# Patient Record
Sex: FEMALE | Race: WHITE | NOT HISPANIC OR LATINO | Employment: OTHER | URBAN - METROPOLITAN AREA
[De-identification: names, ages, dates, MRNs, and addresses within clinical notes are randomized per-mention and may not be internally consistent; named-entity substitution may affect disease eponyms.]

---

## 2023-08-09 ENCOUNTER — HOSPITAL ENCOUNTER (OUTPATIENT)
Dept: DATA CONVERSION | Facility: HOSPITAL | Age: 83
Discharge: HOME | End: 2023-08-09

## 2023-08-09 DIAGNOSIS — C67.8 MALIGNANT NEOPLASM OF OVERLAPPING SITES OF BLADDER (MULTI): ICD-10-CM

## 2023-09-02 PROBLEM — R40.1 CLOUDED CONSCIOUSNESS: Status: ACTIVE | Noted: 2023-09-02

## 2023-09-02 PROBLEM — R07.89 PAIN OF STERNUM: Status: ACTIVE | Noted: 2023-09-02

## 2023-09-02 PROBLEM — R42 VERTIGO: Status: ACTIVE | Noted: 2023-09-02

## 2023-09-02 PROBLEM — C67.9 MALIGNANT TUMOR OF URINARY BLADDER (MULTI): Status: ACTIVE | Noted: 2023-09-02

## 2023-09-02 PROBLEM — M19.90 OSTEOARTHRITIS: Status: ACTIVE | Noted: 2023-09-02

## 2023-09-02 PROBLEM — R47.9 SPEECH PROBLEM: Status: ACTIVE | Noted: 2023-09-02

## 2023-09-02 PROBLEM — G25.81 RESTLESS LEGS: Status: ACTIVE | Noted: 2023-09-02

## 2023-09-02 PROBLEM — F41.9 ANXIETY: Status: ACTIVE | Noted: 2023-09-02

## 2023-09-02 PROBLEM — N95.9 MENOPAUSAL AND POSTMENOPAUSAL DISORDER: Status: ACTIVE | Noted: 2023-09-02

## 2023-09-02 PROBLEM — R49.0 HOARSENESS: Status: ACTIVE | Noted: 2023-09-02

## 2023-09-02 PROBLEM — M76.899 ENTHESOPATHY OF HIP REGION: Status: ACTIVE | Noted: 2023-09-02

## 2023-09-02 PROBLEM — N28.1 RENAL CYST: Status: ACTIVE | Noted: 2023-09-02

## 2023-09-02 PROBLEM — R55 SYNCOPE: Status: ACTIVE | Noted: 2023-09-02

## 2023-09-02 PROBLEM — M19.90 DEGENERATIVE JOINT DISEASE: Status: ACTIVE | Noted: 2023-09-02

## 2023-09-02 PROBLEM — E55.9 VITAMIN D DEFICIENCY: Status: ACTIVE | Noted: 2023-09-02

## 2023-09-02 PROBLEM — F01.50 VASCULAR DEMENTIA (MULTI): Status: ACTIVE | Noted: 2023-09-02

## 2023-09-02 PROBLEM — H90.3 SENSORINEURAL HEARING LOSS, BILATERAL: Status: ACTIVE | Noted: 2023-09-02
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
## 2023-09-02 PROBLEM — I34.0 MITRAL VALVE REGURGITATION: Status: ACTIVE | Noted: 2023-09-02

## 2023-09-02 PROBLEM — F43.21 ADJUSTMENT DISORDER WITH DEPRESSED MOOD: Status: ACTIVE | Noted: 2023-09-02

## 2023-09-02 PROBLEM — R07.9 CHEST PAIN: Status: ACTIVE | Noted: 2023-09-02

## 2023-09-02 PROBLEM — I10 BENIGN HYPERTENSION: Status: ACTIVE | Noted: 2023-09-02

## 2023-09-02 PROBLEM — G25.0 ESSENTIAL TREMOR: Status: ACTIVE | Noted: 2023-09-02

## 2023-09-02 PROBLEM — C67.9 BLADDER CANCER (MULTI): Status: ACTIVE | Noted: 2023-09-02

## 2023-09-02 PROBLEM — M85.80 OSTEOPENIA: Status: ACTIVE | Noted: 2023-09-02

## 2023-09-02 PROBLEM — R51.9 HEADACHE: Status: ACTIVE | Noted: 2023-09-02

## 2023-09-02 PROBLEM — R09.89 CARDIOVASCULAR SYMPTOMS: Status: ACTIVE | Noted: 2023-09-02

## 2023-09-02 PROBLEM — M17.9 OSTEOARTHROSIS OF KNEE: Status: ACTIVE | Noted: 2023-09-02

## 2023-09-02 PROBLEM — I67.9 CEREBROVASCULAR DISEASE: Status: ACTIVE | Noted: 2023-09-02

## 2023-09-02 PROBLEM — E87.5 HYPERKALEMIA: Status: ACTIVE | Noted: 2023-09-02

## 2023-09-02 PROBLEM — R47.89 WORD FINDING DIFFICULTY: Status: ACTIVE | Noted: 2023-09-02

## 2023-09-02 PROBLEM — C67.8 MALIGNANT NEOPLASM OF OVERLAPPING SITES OF BLADDER (MULTI): Status: ACTIVE | Noted: 2023-09-02

## 2023-09-02 PROBLEM — E87.1 HYPONATREMIA: Status: ACTIVE | Noted: 2023-09-02

## 2023-09-02 PROBLEM — E78.5 DYSLIPIDEMIA: Status: ACTIVE | Noted: 2023-09-02

## 2023-09-02 PROBLEM — R41.89 IMPAIRED COGNITION: Status: ACTIVE | Noted: 2023-09-02

## 2023-09-02 PROBLEM — G40.909 SEIZURE DISORDER (MULTI): Status: ACTIVE | Noted: 2023-09-02

## 2023-09-02 PROBLEM — R53.1 ASTHENIA: Status: ACTIVE | Noted: 2023-09-02

## 2023-09-02 PROBLEM — R41.82 ALTERED MENTAL STATUS: Status: ACTIVE | Noted: 2023-09-02

## 2023-09-02 PROBLEM — I61.9 CEREBRAL HEMORRHAGE (MULTI): Status: ACTIVE | Noted: 2023-09-02

## 2023-09-02 PROBLEM — E78.5 HYPERLIPIDEMIA: Status: ACTIVE | Noted: 2023-09-02

## 2023-09-02 PROBLEM — E83.52 HYPERCALCEMIA: Status: ACTIVE | Noted: 2023-09-02

## 2023-09-02 PROBLEM — I63.9 CEREBROVASCULAR ACCIDENT (MULTI): Status: ACTIVE | Noted: 2023-09-02

## 2023-09-02 PROBLEM — R01.1 HEART MURMUR: Status: ACTIVE | Noted: 2023-09-02

## 2023-09-02 PROBLEM — R07.1 PAINFUL BREATHING: Status: ACTIVE | Noted: 2023-09-02

## 2023-09-02 RX ORDER — ASCORBIC ACID 250 MG
250 TABLET ORAL DAILY
COMMUNITY
End: 2023-10-04 | Stop reason: ENTERED-IN-ERROR

## 2023-09-02 RX ORDER — CITALOPRAM 20 MG/1
.5-1 TABLET, FILM COATED ORAL DAILY
COMMUNITY
Start: 2022-02-08 | End: 2023-10-03 | Stop reason: ENTERED-IN-ERROR

## 2023-09-02 RX ORDER — GLUCOSAMINE/CHONDROITIN/C/MANG 500-400 MG
CAPSULE ORAL
COMMUNITY
End: 2023-10-04 | Stop reason: ALTCHOICE

## 2023-09-02 RX ORDER — HYDROCHLOROTHIAZIDE 25 MG/1
TABLET ORAL
COMMUNITY
Start: 2021-02-12 | End: 2023-10-03 | Stop reason: ENTERED-IN-ERROR

## 2023-09-02 RX ORDER — LOSARTAN POTASSIUM 25 MG/1
25 TABLET ORAL NIGHTLY
COMMUNITY
Start: 2015-08-28

## 2023-09-02 RX ORDER — GABAPENTIN 400 MG/1
400 CAPSULE ORAL 3 TIMES DAILY
COMMUNITY
Start: 2013-02-18 | End: 2023-10-03 | Stop reason: ENTERED-IN-ERROR

## 2023-09-02 RX ORDER — CHOLECALCIFEROL (VITAMIN D3) 25 MCG
25 TABLET ORAL DAILY
COMMUNITY
End: 2023-10-04 | Stop reason: ALTCHOICE

## 2023-09-02 RX ORDER — POLYETHYLENE GLYCOL 3350 17 G/17G
17 POWDER, FOR SOLUTION ORAL NIGHTLY
COMMUNITY
Start: 2022-06-21 | End: 2023-10-04 | Stop reason: ALTCHOICE

## 2023-09-02 RX ORDER — TRAMADOL HYDROCHLORIDE 50 MG/1
50 TABLET ORAL EVERY 6 HOURS PRN
COMMUNITY
Start: 2019-03-18 | End: 2023-10-03 | Stop reason: ENTERED-IN-ERROR

## 2023-09-02 RX ORDER — RIVASTIGMINE TARTRATE 1.5 MG/1
1.5 CAPSULE ORAL DAILY
COMMUNITY
End: 2023-10-04 | Stop reason: ALTCHOICE

## 2023-09-02 RX ORDER — ONDANSETRON 4 MG/1
TABLET, FILM COATED ORAL
COMMUNITY
Start: 2019-03-18 | End: 2023-10-03 | Stop reason: ENTERED-IN-ERROR

## 2023-09-02 RX ORDER — GABAPENTIN 300 MG/1
300 CAPSULE ORAL 3 TIMES DAILY
COMMUNITY
End: 2023-10-03 | Stop reason: ENTERED-IN-ERROR

## 2023-10-03 ENCOUNTER — APPOINTMENT (OUTPATIENT)
Dept: RADIOLOGY | Facility: HOSPITAL | Age: 83
DRG: 565 | End: 2023-10-03
Payer: MEDICARE

## 2023-10-03 ENCOUNTER — HOSPITAL ENCOUNTER (INPATIENT)
Facility: HOSPITAL | Age: 83
LOS: 6 days | Discharge: NURSING, REHAB OR LONG-TERM CARE | DRG: 565 | End: 2023-10-10
Attending: STUDENT IN AN ORGANIZED HEALTH CARE EDUCATION/TRAINING PROGRAM | Admitting: INTERNAL MEDICINE
Payer: MEDICARE

## 2023-10-03 DIAGNOSIS — T79.6XXA TRAUMATIC RHABDOMYOLYSIS, INITIAL ENCOUNTER (CMS-HCC): ICD-10-CM

## 2023-10-03 DIAGNOSIS — R55 SYNCOPE, UNSPECIFIED SYNCOPE TYPE: ICD-10-CM
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
## 2023-10-03 DIAGNOSIS — S09.90XA CLOSED HEAD INJURY, INITIAL ENCOUNTER: ICD-10-CM

## 2023-10-03 DIAGNOSIS — R09.89 CARDIOVASCULAR SYMPTOMS: ICD-10-CM

## 2023-10-03 DIAGNOSIS — R07.9 CHEST PAIN, UNSPECIFIED TYPE: ICD-10-CM

## 2023-10-03 DIAGNOSIS — R53.1 GENERALIZED WEAKNESS: ICD-10-CM

## 2023-10-03 DIAGNOSIS — R79.89 ELEVATED TROPONIN: ICD-10-CM

## 2023-10-03 LAB
ALBUMIN SERPL BCP-MCNC: 4.5 G/DL (ref 3.4–5)
ALP SERPL-CCNC: 71 U/L (ref 33–136)
ALT SERPL W P-5'-P-CCNC: 39 U/L (ref 7–45)
ANION GAP SERPL CALC-SCNC: 13 MMOL/L (ref 10–20)
APPEARANCE UR: CLEAR
AST SERPL W P-5'-P-CCNC: 106 U/L (ref 9–39)
BASOPHILS # BLD AUTO: 0.04 X10*3/UL (ref 0–0.1)
BASOPHILS NFR BLD AUTO: 0.3 %
BILIRUB SERPL-MCNC: 0.8 MG/DL (ref 0–1.2)
BILIRUB UR STRIP.AUTO-MCNC: NEGATIVE MG/DL
BUN SERPL-MCNC: 19 MG/DL (ref 6–23)
CALCIUM SERPL-MCNC: 9.1 MG/DL (ref 8.6–10.3)
CARDIAC TROPONIN I PNL SERPL HS: 633 NG/L (ref 0–13)
CARDIAC TROPONIN I PNL SERPL HS: 723 NG/L (ref 0–13)
CARDIAC TROPONIN I PNL SERPL HS: 911 NG/L (ref 0–13)
CHLORIDE SERPL-SCNC: 99 MMOL/L (ref 98–107)
CK SERPL-CCNC: 3433 U/L (ref 0–215)
CK SERPL-CCNC: 4666 U/L (ref 0–215)
CO2 SERPL-SCNC: 26 MMOL/L (ref 21–32)
COLOR UR: YELLOW
CREAT SERPL-MCNC: 0.98 MG/DL (ref 0.5–1.05)
EOSINOPHIL # BLD AUTO: 0.05 X10*3/UL (ref 0–0.4)
EOSINOPHIL NFR BLD AUTO: 0.4 %
ERYTHROCYTE [DISTWIDTH] IN BLOOD BY AUTOMATED COUNT: 12.6 % (ref 11.5–14.5)
GFR SERPL CREATININE-BSD FRML MDRD: 57 ML/MIN/1.73M*2
GLUCOSE SERPL-MCNC: 101 MG/DL (ref 74–99)
GLUCOSE UR STRIP.AUTO-MCNC: NEGATIVE MG/DL
HCT VFR BLD AUTO: 41.9 % (ref 36–46)
HGB BLD-MCNC: 14.2 G/DL (ref 12–16)
IMM GRANULOCYTES # BLD AUTO: 0.04 X10*3/UL (ref 0–0.5)
IMM GRANULOCYTES NFR BLD AUTO: 0.3 % (ref 0–0.9)
KETONES UR STRIP.AUTO-MCNC: NEGATIVE MG/DL
LACTATE SERPL-SCNC: 1.1 MMOL/L (ref 0.4–2)
LEUKOCYTE ESTERASE UR QL STRIP.AUTO: NEGATIVE
LYMPHOCYTES # BLD AUTO: 1.52 X10*3/UL (ref 0.8–3)
LYMPHOCYTES NFR BLD AUTO: 13 %
MAGNESIUM SERPL-MCNC: 2.21 MG/DL (ref 1.6–2.4)
MCH RBC QN AUTO: 32.9 PG (ref 26–34)
MCHC RBC AUTO-ENTMCNC: 33.9 G/DL (ref 32–36)
MCV RBC AUTO: 97 FL (ref 80–100)
MONOCYTES # BLD AUTO: 1.18 X10*3/UL (ref 0.05–0.8)
MONOCYTES NFR BLD AUTO: 10.1 %
NEUTROPHILS # BLD AUTO: 8.83 X10*3/UL (ref 1.6–5.5)
NEUTROPHILS NFR BLD AUTO: 75.9 %
NITRITE UR QL STRIP.AUTO: NEGATIVE
NRBC BLD-RTO: 0 /100 WBCS (ref 0–0)
PH UR STRIP.AUTO: 7 [PH]
PLATELET # BLD AUTO: 192 X10*3/UL (ref 150–450)
PMV BLD AUTO: 10.4 FL (ref 7.5–11.5)
POTASSIUM SERPL-SCNC: 4.3 MMOL/L (ref 3.5–5.3)
PROT SERPL-MCNC: 7.1 G/DL (ref 6.4–8.2)
PROT UR STRIP.AUTO-MCNC: ABNORMAL MG/DL
RBC # BLD AUTO: 4.31 X10*6/UL (ref 4–5.2)
RBC # UR STRIP.AUTO: ABNORMAL /UL
RBC #/AREA URNS AUTO: NORMAL /HPF
SARS-COV-2 RNA RESP QL NAA+PROBE: NOT DETECTED
SODIUM SERPL-SCNC: 134 MMOL/L (ref 136–145)
SP GR UR STRIP.AUTO: 1.01
UROBILINOGEN UR STRIP.AUTO-MCNC: <2 MG/DL
WBC # BLD AUTO: 11.7 X10*3/UL (ref 4.4–11.3)
WBC #/AREA URNS AUTO: NORMAL /HPF

## 2023-10-03 PROCEDURE — 2500000004 HC RX 250 GENERAL PHARMACY W/ HCPCS (ALT 636 FOR OP/ED): Performed by: NURSE PRACTITIONER

## 2023-10-03 PROCEDURE — 84075 ASSAY ALKALINE PHOSPHATASE: CPT | Performed by: NURSE PRACTITIONER

## 2023-10-03 PROCEDURE — 83605 ASSAY OF LACTIC ACID: CPT | Performed by: NURSE PRACTITIONER

## 2023-10-03 PROCEDURE — 82550 ASSAY OF CK (CPK): CPT | Performed by: NURSE PRACTITIONER

## 2023-10-03 PROCEDURE — 36415 COLL VENOUS BLD VENIPUNCTURE: CPT | Performed by: NURSE PRACTITIONER

## 2023-10-03 PROCEDURE — 99285 EMERGENCY DEPT VISIT HI MDM: CPT | Performed by: STUDENT IN AN ORGANIZED HEALTH CARE EDUCATION/TRAINING PROGRAM

## 2023-10-03 PROCEDURE — 84484 ASSAY OF TROPONIN QUANT: CPT | Performed by: NURSE PRACTITIONER

## 2023-10-03 PROCEDURE — 72170 X-RAY EXAM OF PELVIS: CPT | Mod: FY

## 2023-10-03 PROCEDURE — 71045 X-RAY EXAM CHEST 1 VIEW: CPT | Mod: FY

## 2023-10-03 PROCEDURE — G1004 CDSM NDSC: HCPCS

## 2023-10-03 PROCEDURE — 70450 CT HEAD/BRAIN W/O DYE: CPT | Performed by: RADIOLOGY

## 2023-10-03 PROCEDURE — 2500000001 HC RX 250 WO HCPCS SELF ADMINISTERED DRUGS (ALT 637 FOR MEDICARE OP): Performed by: NURSE PRACTITIONER

## 2023-10-03 PROCEDURE — 81001 URINALYSIS AUTO W/SCOPE: CPT | Performed by: NURSE PRACTITIONER

## 2023-10-03 PROCEDURE — 72125 CT NECK SPINE W/O DYE: CPT | Performed by: RADIOLOGY

## 2023-10-03 PROCEDURE — 96360 HYDRATION IV INFUSION INIT: CPT

## 2023-10-03 PROCEDURE — 99223 1ST HOSP IP/OBS HIGH 75: CPT

## 2023-10-03 PROCEDURE — 96361 HYDRATE IV INFUSION ADD-ON: CPT

## 2023-10-03 PROCEDURE — 71045 X-RAY EXAM CHEST 1 VIEW: CPT | Performed by: STUDENT IN AN ORGANIZED HEALTH CARE EDUCATION/TRAINING PROGRAM

## 2023-10-03 PROCEDURE — 87635 SARS-COV-2 COVID-19 AMP PRB: CPT | Performed by: NURSE PRACTITIONER

## 2023-10-03 PROCEDURE — 82550 ASSAY OF CK (CPK): CPT

## 2023-10-03 PROCEDURE — 83735 ASSAY OF MAGNESIUM: CPT | Performed by: NURSE PRACTITIONER

## 2023-10-03 PROCEDURE — 71275 CT ANGIOGRAPHY CHEST: CPT | Performed by: RADIOLOGY

## 2023-10-03 PROCEDURE — 85025 COMPLETE CBC W/AUTO DIFF WBC: CPT | Performed by: NURSE PRACTITIONER

## 2023-10-03 PROCEDURE — 70450 CT HEAD/BRAIN W/O DYE: CPT

## 2023-10-03 PROCEDURE — 84484 ASSAY OF TROPONIN QUANT: CPT | Performed by: STUDENT IN AN ORGANIZED HEALTH CARE EDUCATION/TRAINING PROGRAM

## 2023-10-03 PROCEDURE — 72170 X-RAY EXAM OF PELVIS: CPT | Performed by: STUDENT IN AN ORGANIZED HEALTH CARE EDUCATION/TRAINING PROGRAM

## 2023-10-03 PROCEDURE — 2550000001 HC RX 255 CONTRASTS: Performed by: STUDENT IN AN ORGANIZED HEALTH CARE EDUCATION/TRAINING PROGRAM

## 2023-10-03 RX ORDER — ASPIRIN 325 MG
325 TABLET ORAL ONCE
Status: COMPLETED | OUTPATIENT
Start: 2023-10-03 | End: 2023-10-03

## 2023-10-03 RX ORDER — ACETAMINOPHEN 325 MG/1
650 TABLET ORAL ONCE
Status: COMPLETED | OUTPATIENT
Start: 2023-10-03 | End: 2023-10-03

## 2023-10-03 RX ORDER — ACETAMINOPHEN 325 MG/1
TABLET ORAL
Status: COMPLETED
Start: 2023-10-03 | End: 2023-10-03

## 2023-10-03 RX ADMIN — SODIUM CHLORIDE 500 ML: 9 INJECTION, SOLUTION INTRAVENOUS at 16:00

## 2023-10-03 RX ADMIN — IOHEXOL 54 ML: 350 INJECTION, SOLUTION INTRAVENOUS at 20:43

## 2023-10-03 RX ADMIN — ASPIRIN 325 MG ORAL TABLET 325 MG: 325 PILL ORAL at 20:44

## 2023-10-03 RX ADMIN — SODIUM CHLORIDE 500 ML: 9 INJECTION, SOLUTION INTRAVENOUS at 20:45

## 2023-10-03 RX ADMIN — ACETAMINOPHEN 650 MG: 325 TABLET, FILM COATED ORAL at 20:44

## 2023-10-03 ASSESSMENT — PAIN SCALES - GENERAL
PAINLEVEL_OUTOF10: 0 - NO PAIN
PAINLEVEL_OUTOF10: 0 - NO PAIN

## 2023-10-03 ASSESSMENT — LIFESTYLE VARIABLES
HAVE PEOPLE ANNOYED YOU BY CRITICIZING YOUR DRINKING: NO
EVER HAD A DRINK FIRST THING IN THE MORNING TO STEADY YOUR NERVES TO GET RID OF A HANGOVER: NO
EVER FELT BAD OR GUILTY ABOUT YOUR DRINKING: NO
HAVE YOU EVER FELT YOU SHOULD CUT DOWN ON YOUR DRINKING: NO

## 2023-10-03 ASSESSMENT — COLUMBIA-SUICIDE SEVERITY RATING SCALE - C-SSRS
1. IN THE PAST MONTH, HAVE YOU WISHED YOU WERE DEAD OR WISHED YOU COULD GO TO SLEEP AND NOT WAKE UP?: NO
6. HAVE YOU EVER DONE ANYTHING, STARTED TO DO ANYTHING, OR PREPARED TO DO ANYTHING TO END YOUR LIFE?: NO
2. HAVE YOU ACTUALLY HAD ANY THOUGHTS OF KILLING YOURSELF?: NO

## 2023-10-03 ASSESSMENT — PAIN - FUNCTIONAL ASSESSMENT: PAIN_FUNCTIONAL_ASSESSMENT: 0-10

## 2023-10-03 NOTE — PROGRESS NOTES
Pharmacy Medication History Review    Neha Carranza is a 83 y.o. female admitted for No Principal Problem: There is no principal problem currently on the Problem List. Please update the Problem List and refresh.. Pharmacy reviewed the patient's [id number] medications and allergies for accuracy.    The list below reflectives the updated PTA list. Please review each medication in order reconciliation for additional clarification and justification.  (Not in a hospital admission)       The list below reflectives the updated allergy list. Please review each documented allergy for additional clarification and justification.  Allergies  Indicated as Unable to Assess by Sue Ferreira RPh on 10/3/2023 (Parent, guardian, or caregiver unavailable)        Severity Reactions Comments    Tramadol Medium Other Vomiting    Ciprofloxacin Low Rash     Esomeprazole Low Rash             Below are additional concerns with the patient's PTA list.      Berkley Brennan CPhT

## 2023-10-03 NOTE — ED PROVIDER NOTES
[facility]  ED  Encounter Note  Admit Date/RoomTime: 10/3/2023  2:20 PM  ED Room: [place] [address]  NAME: Neha Carranza  : 1940  MRN: [id number]     Chief Complaint:  Fall and Weakness, Gen (Had COVID vaccine yesterday, found on the ground at [facility] where she lives. Pt is A&OX2-3 at baseline but recently has been showing signs of dementia. Pt denies any c/o, poor historian. Pt's daughter at bedside.)    HISTORY OF PRESENT ILLNESS        Neha Carranza is a 83 y.o. female who presents to the ED for evaluation of possible syncope episode. Pt did get the covid vaccine yesterday. Was found on the floor after being there for about an hour or so at best estimate.     Patient not able to state if she had breakfast.  She stated that she could not get off the floor.  Appears weak per family.  Uses a walker.  Pt overall poor historian.  Uses an aspirin but denied other blood thinners.  Hx of bladder cancer few months ago. No chemo.     ROS   Pertinent positives and negatives are stated within HPI, all other systems reviewed and are negative.    Past Medical History:  has a past medical history of Encounter for screening mammogram for malignant neoplasm of breast, Other conditions influencing health status, Personal history of other diseases of the circulatory system, Personal history of other diseases of the musculoskeletal system and connective tissue, and Personal history of other specified conditions.    Surgical History:  has a past surgical history that includes Colonoscopy (2013); Other surgical history (2013); and US guided abscess drain (2022).    Social History:      Family History: family history includes Heart disease in her father and mother; Heart failure in her father and mother; Parkinsonism in her sister.     Allergies: Tramadol, Ciprofloxacin, and Esomeprazole    PHYSICAL EXAM   Oxygen Saturation Interpretation: Normal.     Physical  Exam  Constitutional/General: Alert and oriented x2-3, well appearing, non toxic  HEENT:  NC/NT. PERRLA.  Airway patent.  Neck: Supple, full ROM. No midline vertebral tenderness or crepitus.   Respiratory: Lung sounds clear to auscultation bilaterally. No wheezes, rhonchi or stridor. Not in respiratory distress.  CV:  Regular rate. Regular rhythm. No murmurs or rubs. 2+ distal pulses.  GI:  Abdomen soft, non-tender, non-distended. +BS. No rebound, guarding, or rigidity. No pulsatile masses.  Musculoskeletal: Moves all extremities x 4. Warm and well perfused. Capillary refill <3 seconds  Integument: Skin warm and dry. No rashes.   Neurologic: Alert and oriented with no focal deficits, symmetric strength 5/5 in the upper and lower extremities bilaterally.  Psychiatric: Normal affect.    Lab / Imaging Results   (All laboratory and radiology results have been personally reviewed by myself)  Labs:  Results for orders placed or performed during the hospital encounter of 10/03/23   CBC and Auto Differential   Result Value Ref Range    WBC 11.7 (H) 4.4 - 11.3 x10*3/uL    nRBC 0.0 0.0 - 0.0 /100 WBCs    RBC 4.31 4.00 - 5.20 x10*6/uL    Hemoglobin 14.2 12.0 - 16.0 g/dL    Hematocrit 41.9 36.0 - 46.0 %    MCV 97 80 - 100 fL    MCH 32.9 26.0 - 34.0 pg    MCHC 33.9 32.0 - 36.0 g/dL    RDW 12.6 11.5 - 14.5 %    Platelets 192 150 - 450 x10*3/uL    MPV 10.4 7.5 - 11.5 fL    Neutrophils % 75.9 40.0 - 80.0 %    Immature Granulocytes %, Automated 0.3 0.0 - 0.9 %    Lymphocytes % 13.0 13.0 - 44.0 %    Monocytes % 10.1 2.0 - 10.0 %    Eosinophils % 0.4 0.0 - 6.0 %    Basophils % 0.3 0.0 - 2.0 %    Neutrophils Absolute 8.83 (H) 1.60 - 5.50 x10*3/uL    Immature Granulocytes Absolute, Automated 0.04 0.00 - 0.50 x10*3/uL    Lymphocytes Absolute 1.52 0.80 - 3.00 x10*3/uL    Monocytes Absolute 1.18 (H) 0.05 - 0.80 x10*3/uL    Eosinophils Absolute 0.05 0.00 - 0.40 x10*3/uL    Basophils Absolute 0.04 0.00 - 0.10 x10*3/uL   Comprehensive  metabolic panel   Result Value Ref Range    Glucose 101 (H) 74 - 99 mg/dL    Sodium 134 (L) 136 - 145 mmol/L    Potassium 4.3 3.5 - 5.3 mmol/L    Chloride 99 98 - 107 mmol/L    Bicarbonate 26 21 - 32 mmol/L    Anion Gap 13 10 - 20 mmol/L    Urea Nitrogen 19 6 - 23 mg/dL    Creatinine 0.98 0.50 - 1.05 mg/dL    eGFR 57 (L) >60 mL/min/1.73m*2    Calcium 9.1 8.6 - 10.3 mg/dL    Albumin 4.5 3.4 - 5.0 g/dL    Alkaline Phosphatase 71 33 - 136 U/L    Total Protein 7.1 6.4 - 8.2 g/dL     (H) 9 - 39 U/L    Bilirubin, Total 0.8 0.0 - 1.2 mg/dL    ALT 39 7 - 45 U/L   Magnesium   Result Value Ref Range    Magnesium 2.21 1.60 - 2.40 mg/dL   Lactate   Result Value Ref Range    Lactate 1.1 0.4 - 2.0 mmol/L   Troponin I, High Sensitivity   Result Value Ref Range    Troponin I, High Sensitivity 911 (HH) 0 - 13 ng/L   Urinalysis with Reflex Microscopic   Result Value Ref Range    Color, Urine Yellow Straw, Yellow    Appearance, Urine Clear Clear    Specific Gravity, Urine 1.011 1.005 - 1.035    pH, Urine 7.0 5.0, 5.5, 6.0, 6.5, 7.0, 7.5, 8.0    Protein, Urine 30 (1+) (N) NEGATIVE mg/dL    Glucose, Urine NEGATIVE NEGATIVE mg/dL    Blood, Urine MODERATE (2+) (A) NEGATIVE    Ketones, Urine NEGATIVE NEGATIVE mg/dL    Bilirubin, Urine NEGATIVE NEGATIVE    Urobilinogen, Urine <2.0 <2.0 mg/dL    Nitrite, Urine NEGATIVE NEGATIVE    Leukocyte Esterase, Urine NEGATIVE NEGATIVE   Cardiac Enzymes - CPK   Result Value Ref Range    Creatine Kinase 4,666 (H) 0 - 215 U/L   Urinalysis Microscopic Only   Result Value Ref Range    WBC, Urine 1-5 1-5, NONE /HPF    RBC, Urine NONE NONE, 1-2, 3-5 /HPF   Troponin I, High Sensitivity   Result Value Ref Range    Troponin I, High Sensitivity 723 (HH) 0 - 13 ng/L   Sars-CoV-2 PCR, Symptomatic   Result Value Ref Range    Coronavirus 2019, PCR Not Detected Not Detected     Imaging:  All Radiology results interpreted by Radiologist unless otherwise noted.  CT angio chest for pulmonary embolism   Final  Result   No acute pulmonary embolism to the segmental level.        Noncalcified pulmonary nodules measuring up to 6 mm in the right   lower lobe, similar to 01/10/2022.        Instructions:  Consider follow up non contrast chest CT at 3-6   months, then consider CT chest at 18-24 months. (Marlon Nails et   al., Guidelines for management of incidental pulmonary nodules   detected on CT images: From the Fleischner Society 2017, Radiology.   2017 Jul;284 (1):228-243.) FLEISCHNER.ACR.IF.3        Mild hyperinflated lung fields, cardiomegaly and possible fibrosis.        Partially imaged cystic partially calcified left renal lesion better   delineated on dedicated abdominal imaging. Continued follow-up   recommended. Renal MRI with contrast may be considered as clinically   indicated.        MACRO:   Incidental Finding:  Multiple solid non-calcified pulmonary nodules   measuring up to 6-8 mm.  (**-YCF-**)        Critical Finding:  There are multiple incidental findings. See   findings. notification was initiated on 10/3/2023 at 9:11 pm by   Philomena Pack.  (**-YCF-**) Instructions:        Signed by: Philomena Pack 10/3/2023 9:11 PM   Dictation workstation:   [id number]      XR chest 1 view   Final Result   1.  Somewhat low lung volumes without focal consolidation, pleural   effusion or pneumothorax.                  MACRO:   None        Signed by: Kamini Vicente 10/3/2023 4:41 PM   Dictation workstation:   [id number]      XR pelvis 1-2 views   Final Result   No evidence of acute trauma to the pelvis.             MACRO:   None        Signed by: Kamini Vicente 10/3/2023 4:43 PM   Dictation workstation:   [id number]      CT head W O contrast trauma protocol   Final Result   No acute intracranial hemorrhage or mass-effect.        Left maxillary sinus disease.        MACRO:   None.             Signed by: Majo Rudolph 10/3/2023 4:27 PM   Dictation workstation:   [id number]      CT cervical spine wo IV  contrast   Final Result   No acute cervical vertebral displacement or acute displaced fracture.   Mild spondylolisthesis, moderate compression deformity of C7 and   multilevel productive/degenerative changes similar to 12/21/2022.        MACRO:   None.        Signed by: Majo Rudolph 10/3/2023 4:35 PM   Dictation workstation:   [id number]          ED Course / Medical Decision Making     Medications   sodium chloride 0.9 % bolus 500 mL (0 mL intravenous Stopped 10/3/23 1837)   aspirin tablet 325 mg (325 mg oral Given 10/3/23 2044)   acetaminophen (Tylenol) tablet 650 mg (650 mg oral Given 10/3/23 2044)   sodium chloride 0.9 % bolus 500 mL (500 mL intravenous New Bag 10/3/23 2045)   iohexol (OMNIPaque) 350 mg iodine/mL injection 54 mL (54 mL intravenous Given 10/3/23 2043)   acetaminophen (Tylenol) 325 mg tablet  - [employer] Override Pull (  Override Pull 10/3/23 2050)     Diagnoses as of 10/03/23 2133   Fall, initial encounter   Closed head injury, initial encounter   Generalized weakness   Elevated troponin   Traumatic rhabdomyolysis, initial encounter (CMS/[facility])     EKG  OCTOBER 3 2023 1554  RATE 75  PRINTERVAL 202 MS  QRS DURATION 130 MS  QT /491 MS  PRT AXES 62 -63 131  NSR  LEFT AXIS DEVIATION  LEFT BBB   NO PREVIOUS EKG FOR COMPARSION    Re-examination:  Feels some better with fluids   Patient’s condition stable.    Consult(s):   Dr Jonathan michael        MDM:   Pt with elevated troponin and CK  Patient dehydrated could be due to covid vaccine  Has been weak last few days.  Temp 38.1 could be attributed to covid vaccine. Cultures pending.  Continue hydration, repeat ck levels in am.  Cardiology consulted, will follow. Pt denied chest pain but did have generalized weakness  Ddx: general weakness elevated troponin rhabo fever and chills possible vaccine reaction    Plan of Care/Counseling:  I reviewed today's visit with the patient and daughter in addition to providing specific details for the plan of care and  counseling regarding the diagnosis and prognosis.  Questions are answered at this time and are agreeable with the plan.    ASSESSMENT     1. Fall, initial encounter    2. Closed head injury, initial encounter    3. Generalized weakness    4. Elevated troponin    5. Traumatic rhabdomyolysis, initial encounter (CMS/[facility])      PLAN   Admitted to hospitalist     New Medications     New Medications Ordered This Visit   Medications    sodium chloride 0.9 % bolus 500 mL    aspirin tablet 325 mg     Order Specific Question:   If ordered PRN for pain, nurse is permitted to administer this medication for higher pain scores based on patient preference?     Answer:   Yes    acetaminophen (Tylenol) tablet 650 mg     Order Specific Question:   If ordered PRN for pain, nurse is permitted to administer this medication for higher pain scores based on patient preference?     Answer:   Yes    sodium chloride 0.9 % bolus 500 mL    iohexol (OMNIPaque) 350 mg iodine/mL injection 54 mL    acetaminophen (Tylenol) 325 mg tablet  - [employer]ell Override Pull     Created by cabinet override     Electronically signed by ALIS Pride-CNP    **This report was transcribed using voice recognition software. Every effort was made to ensure accuracy; however, inadvertent computerized transcription errors may be present.    This patient was seen by the advanced practice provider or resident above.  I have seen and examined the patient, agree with the workup, evaluation, management and diagnosis. The care plan has beendiscussed.     My assessment is a 83-year-old female presents the emergency department after syncopal episode.  Patient awake and alert and asymptomatic but does have a history of dementia.  Patient moving all extremities equally and spontaneously.  Daughter at bedside.  CT PE negative for pulmonary embolism.  She does have markedly elevated troponin.  EKG shows no ST segment elevation.  Likely demand ischemia after laying on the  floor.  Elevated CK.  Had given a liter of fluids.  Given the syncopal episode patient admitted to medicine for further work-up and evaluation.  Patient and daughter at bedside amenable to plan.    Bernadine Manriquez DO  Emergency Medicine    I have personally performed and/or participated in all of the above services and procedures. I have reviewed all the nurses' notes and have confirmed their findings, and have incorporated those findings into this medical record.     I have reviewed the resident or advanced practice provider's history and physician finding; as well as the treatment. On my own examination, I agree and incorporated in this document my own history, examination findings and clinical decision making.    All notation in this Addendum supersedes information presented by the resident or NORBERT as listed above.    END OF ED PROVIDER NOTE     Bernadine Manriquez DO  10/06/23 6006

## 2023-10-04 LAB
ALBUMIN SERPL BCP-MCNC: 3.9 G/DL (ref 3.4–5)
ALP SERPL-CCNC: 58 U/L (ref 33–136)
ALT SERPL W P-5'-P-CCNC: 39 U/L (ref 7–45)
ANION GAP SERPL CALC-SCNC: 12 MMOL/L (ref 10–20)
AST SERPL W P-5'-P-CCNC: 97 U/L (ref 9–39)
BILIRUB SERPL-MCNC: 0.5 MG/DL (ref 0–1.2)
BUN SERPL-MCNC: 17 MG/DL (ref 6–23)
CALCIUM SERPL-MCNC: 8 MG/DL (ref 8.6–10.3)
CHLORIDE SERPL-SCNC: 104 MMOL/L (ref 98–107)
CK SERPL-CCNC: 2955 U/L (ref 0–215)
CO2 SERPL-SCNC: 24 MMOL/L (ref 21–32)
CREAT SERPL-MCNC: 0.96 MG/DL (ref 0.5–1.05)
ERYTHROCYTE [DISTWIDTH] IN BLOOD BY AUTOMATED COUNT: 12.6 % (ref 11.5–14.5)
GFR SERPL CREATININE-BSD FRML MDRD: 58 ML/MIN/1.73M*2
GLUCOSE SERPL-MCNC: 158 MG/DL (ref 74–99)
HCT VFR BLD AUTO: 41.5 % (ref 36–46)
HGB BLD-MCNC: 13.5 G/DL (ref 12–16)
MCH RBC QN AUTO: 32.5 PG (ref 26–34)
MCHC RBC AUTO-ENTMCNC: 32.5 G/DL (ref 32–36)
MCV RBC AUTO: 100 FL (ref 80–100)
NRBC BLD-RTO: 0 /100 WBCS (ref 0–0)
PLATELET # BLD AUTO: 188 X10*3/UL (ref 150–450)
PMV BLD AUTO: 10.6 FL (ref 7.5–11.5)
POTASSIUM SERPL-SCNC: 4 MMOL/L (ref 3.5–5.3)
PROT SERPL-MCNC: 6.3 G/DL (ref 6.4–8.2)
RBC # BLD AUTO: 4.15 X10*6/UL (ref 4–5.2)
SODIUM SERPL-SCNC: 136 MMOL/L (ref 136–145)
WBC # BLD AUTO: 9.1 X10*3/UL (ref 4.4–11.3)

## 2023-10-04 PROCEDURE — 2500000001 HC RX 250 WO HCPCS SELF ADMINISTERED DRUGS (ALT 637 FOR MEDICARE OP)

## 2023-10-04 PROCEDURE — 97161 PT EVAL LOW COMPLEX 20 MIN: CPT | Mod: GP

## 2023-10-04 PROCEDURE — 82550 ASSAY OF CK (CPK): CPT

## 2023-10-04 PROCEDURE — 80053 COMPREHEN METABOLIC PANEL: CPT

## 2023-10-04 PROCEDURE — 2500000004 HC RX 250 GENERAL PHARMACY W/ HCPCS (ALT 636 FOR OP/ED)

## 2023-10-04 PROCEDURE — 85027 COMPLETE CBC AUTOMATED: CPT

## 2023-10-04 PROCEDURE — 36415 COLL VENOUS BLD VENIPUNCTURE: CPT

## 2023-10-04 PROCEDURE — 2060000001 HC INTERMEDIATE ICU ROOM DAILY

## 2023-10-04 PROCEDURE — 2580000001 HC RX 258 IV SOLUTIONS

## 2023-10-04 RX ORDER — SODIUM CHLORIDE, SODIUM LACTATE, POTASSIUM CHLORIDE, CALCIUM CHLORIDE 600; 310; 30; 20 MG/100ML; MG/100ML; MG/100ML; MG/100ML
75 INJECTION, SOLUTION INTRAVENOUS CONTINUOUS
Status: DISCONTINUED | OUTPATIENT
Start: 2023-10-04 | End: 2023-10-06

## 2023-10-04 RX ORDER — ARIPIPRAZOLE 10 MG/1
10 TABLET ORAL NIGHTLY
Status: DISCONTINUED | OUTPATIENT
Start: 2023-10-04 | End: 2023-10-08

## 2023-10-04 RX ORDER — ACETAMINOPHEN 650 MG/1
650 SUPPOSITORY RECTAL EVERY 4 HOURS PRN
Status: DISCONTINUED | OUTPATIENT
Start: 2023-10-04 | End: 2023-10-11 | Stop reason: HOSPADM

## 2023-10-04 RX ORDER — ACETAMINOPHEN 160 MG/5ML
650 SOLUTION ORAL EVERY 4 HOURS PRN
Status: DISCONTINUED | OUTPATIENT
Start: 2023-10-04 | End: 2023-10-11 | Stop reason: HOSPADM

## 2023-10-04 RX ORDER — NAPROXEN SODIUM 220 MG/1
81 TABLET, FILM COATED ORAL DAILY
Status: DISCONTINUED | OUTPATIENT
Start: 2023-10-04 | End: 2023-10-11 | Stop reason: HOSPADM

## 2023-10-04 RX ORDER — ENOXAPARIN SODIUM 100 MG/ML
40 INJECTION SUBCUTANEOUS EVERY 24 HOURS
Status: DISCONTINUED | OUTPATIENT
Start: 2023-10-04 | End: 2023-10-11 | Stop reason: HOSPADM

## 2023-10-04 RX ORDER — SODIUM CHLORIDE, SODIUM LACTATE, POTASSIUM CHLORIDE, CALCIUM CHLORIDE 600; 310; 30; 20 MG/100ML; MG/100ML; MG/100ML; MG/100ML
75 INJECTION, SOLUTION INTRAVENOUS CONTINUOUS
Status: CANCELLED | OUTPATIENT
Start: 2023-10-04

## 2023-10-04 RX ORDER — ACETAMINOPHEN 325 MG/1
650 TABLET ORAL EVERY 4 HOURS PRN
Status: DISCONTINUED | OUTPATIENT
Start: 2023-10-04 | End: 2023-10-11 | Stop reason: HOSPADM

## 2023-10-04 RX ORDER — LOSARTAN POTASSIUM 50 MG/1
25 TABLET ORAL NIGHTLY
Status: DISCONTINUED | OUTPATIENT
Start: 2023-10-04 | End: 2023-10-11 | Stop reason: HOSPADM

## 2023-10-04 RX ORDER — OLANZAPINE 10 MG/2ML
2.5 INJECTION, POWDER, FOR SOLUTION INTRAMUSCULAR DAILY PRN
Status: DISCONTINUED | OUTPATIENT
Start: 2023-10-04 | End: 2023-10-11 | Stop reason: HOSPADM

## 2023-10-04 RX ADMIN — ARIPIPRAZOLE 10 MG: 10 TABLET ORAL at 21:41

## 2023-10-04 RX ADMIN — SODIUM CHLORIDE, POTASSIUM CHLORIDE, SODIUM LACTATE AND CALCIUM CHLORIDE 75 ML/HR: 600; 310; 30; 20 INJECTION, SOLUTION INTRAVENOUS at 14:24

## 2023-10-04 RX ADMIN — SODIUM CHLORIDE 500 ML: 9 INJECTION, SOLUTION INTRAVENOUS at 05:32

## 2023-10-04 RX ADMIN — LOSARTAN POTASSIUM 25 MG: 50 TABLET, FILM COATED ORAL at 21:41

## 2023-10-04 RX ADMIN — ASPIRIN 81 MG CHEWABLE TABLET 81 MG: 81 TABLET CHEWABLE at 08:28

## 2023-10-04 ASSESSMENT — PAIN SCALES - WONG BAKER: WONGBAKER_NUMERICALRESPONSE: NO HURT

## 2023-10-04 ASSESSMENT — COGNITIVE AND FUNCTIONAL STATUS - GENERAL
MOBILITY SCORE: 17
MOVING TO AND FROM BED TO CHAIR: A LITTLE
WALKING IN HOSPITAL ROOM: A LITTLE
MOVING FROM LYING ON BACK TO SITTING ON SIDE OF FLAT BED WITH BEDRAILS: A LITTLE
TURNING FROM BACK TO SIDE WHILE IN FLAT BAD: A LITTLE
STANDING UP FROM CHAIR USING ARMS: A LITTLE
CLIMB 3 TO 5 STEPS WITH RAILING: A LOT

## 2023-10-04 ASSESSMENT — PAIN SCALES - GENERAL
PAINLEVEL_OUTOF10: 0 - NO PAIN

## 2023-10-04 ASSESSMENT — PAIN - FUNCTIONAL ASSESSMENT: PAIN_FUNCTIONAL_ASSESSMENT: 0-10

## 2023-10-04 NOTE — H&P
History Of Present Illness  Ms. Neha Carranza is an 83 year old female with a PMHx of HTN, and recurrent falls, who presented to the [facility] ED after being found on the floor at home after a fall.  She does not remember the incident and is a poor historian, so the history was gathered from her daughter at bedside.  Per her daughter, the patient lives alone, but someone checks on her six days a week.  The helper from [employer] came over to the patient's house at 1:00pm, but the patient did not answer the door.  Upon entering the house she was found awake, and down in her bedroom with her walker in the bathroom.  Unfortunately she did not remember the circumstances surrounding her fall, and was unable to tell us how she fell or for how long she was on the ground.  She did not bite her tongue, or lose control of her bowel/bladder.  She does not remember hitting her head with the fall, and does not endorse any bruises, lumps, or bumps at this time.  Daughter reports she did receive the COVID vaccine yesterday.    Of note, she has an extensive history of falls reportedly due to dizziness that happen 2-3 times per month according to her daughter.  The patient says she can usually feel it coming on, in which case she will try and lay down on the couch or the bed before the dizziness hits.  They endorse having seen neurology for this issue, and having a course of PT before but without any significant improvement.  The most recent record of a similar fall requiring a trip to the ED was 12/21/22 at which time workup was negative for any acute findings and she was discharged home from the ED.    12 Point ROS negative unless otherwise specified above.     ED Course:  -Vitals: Temp 37.4C (Tmax 38.1), HR 81bpm, SPO2 95%, RR 14, /96 mmHg  -Labs:  -CBC: WBC 11.7, Hb 14.2, Plt 192, Hct 41.9, ANC 8.83  -CMP: Glu 101, Na 134, K 4.3, Cl 99, HCO3 26, BUN 19, Cr .98, Ca 9.1,  , Mg 2.21  -Lactate: 1.1  -CK:  4,666  -Troponin: 911 => 723  -EKG: Rate 75, SC Interval 202ms, QRS 130ms, QT /491ms, NSR, LAD, Left BBB  -UA: 1+ Protein and 2+ blood. Otherwise unremarkable.  -Imaging:    CXR: Somewhat low lung volumes without focal consolidation, pleural effusion, or pneumothorax   Pelvis XRay: No evidence of acute trauma to the pelvis   CT Head: No acute intracranial hemorrhage or mass-effect. Left maxillar sinus disease.   CT C Spine: No acute cervical vertebral displacement or acute displaced fracture. Mild spondylolisthesis, moderate compression deformity of C7 and multilevel productive/degenerative changes similar to 12/21/22   CT PE: No acute pulmonary embolism to the segmental level  -Meds &Fluids    -ASA 325mg   -Tylenol 650mg   -1L NS bolus     Past Medical History: Bladder Cancer, HTN, Recurrent Falls    Allergies: See EMR  Surgical History: Surgery for bladder cancer in January, March, and August 2023  Family History: Noncontributory  Medications: Losartan 25mg, ASA 81mg  Social History:    -Tobacco: non-smoker   -Alcohol: Denies alcohol use     Physical Exam  General: Patient appears stated age. Awake, alert, oriented, in no acute distress  HENT:  NC/AT, oral mucosa moist without lesions  Eyes:  clear sclera, anicteric, EOMI  CV: regular rate and rhythm, no JVD, possible murmur  Resp: clear to auscultation bilaterally, no wheezes, rales or rhonchi   Abd:  Soft, non-tender, non-distended. No rebound, rigidity or guarding.   Neuro: Some difficulty with word finding.  Some confused and tangential statements. gross muscle strength and sensation intact. CN II-XII grossly intact  Extremities: no pitting edema   Skin: Warm, dry, intact  Psych: Appropriate mood and affect     Last Recorded Vitals  /74 (BP Location: Left arm, Patient Position: Lying)   Pulse 74   Temp 38.1 °C (100.6 °F)   Resp 18   Wt 55 kg (121 lb 4.1 oz)   SpO2 96%     Relevant Results  Scheduled medications    Continuous  medications    PRN medications  Results for orders placed or performed during the hospital encounter of 10/03/23 (from the past 24 hour(s))   CBC and Auto Differential   Result Value Ref Range    WBC 11.7 (H) 4.4 - 11.3 x10*3/uL    nRBC 0.0 0.0 - 0.0 /100 WBCs    RBC 4.31 4.00 - 5.20 x10*6/uL    Hemoglobin 14.2 12.0 - 16.0 g/dL    Hematocrit 41.9 36.0 - 46.0 %    MCV 97 80 - 100 fL    MCH 32.9 26.0 - 34.0 pg    MCHC 33.9 32.0 - 36.0 g/dL    RDW 12.6 11.5 - 14.5 %    Platelets 192 150 - 450 x10*3/uL    MPV 10.4 7.5 - 11.5 fL    Neutrophils % 75.9 40.0 - 80.0 %    Immature Granulocytes %, Automated 0.3 0.0 - 0.9 %    Lymphocytes % 13.0 13.0 - 44.0 %    Monocytes % 10.1 2.0 - 10.0 %    Eosinophils % 0.4 0.0 - 6.0 %    Basophils % 0.3 0.0 - 2.0 %    Neutrophils Absolute 8.83 (H) 1.60 - 5.50 x10*3/uL    Immature Granulocytes Absolute, Automated 0.04 0.00 - 0.50 x10*3/uL    Lymphocytes Absolute 1.52 0.80 - 3.00 x10*3/uL    Monocytes Absolute 1.18 (H) 0.05 - 0.80 x10*3/uL    Eosinophils Absolute 0.05 0.00 - 0.40 x10*3/uL    Basophils Absolute 0.04 0.00 - 0.10 x10*3/uL   Comprehensive metabolic panel   Result Value Ref Range    Glucose 101 (H) 74 - 99 mg/dL    Sodium 134 (L) 136 - 145 mmol/L    Potassium 4.3 3.5 - 5.3 mmol/L    Chloride 99 98 - 107 mmol/L    Bicarbonate 26 21 - 32 mmol/L    Anion Gap 13 10 - 20 mmol/L    Urea Nitrogen 19 6 - 23 mg/dL    Creatinine 0.98 0.50 - 1.05 mg/dL    eGFR 57 (L) >60 mL/min/1.73m*2    Calcium 9.1 8.6 - 10.3 mg/dL    Albumin 4.5 3.4 - 5.0 g/dL    Alkaline Phosphatase 71 33 - 136 U/L    Total Protein 7.1 6.4 - 8.2 g/dL     (H) 9 - 39 U/L    Bilirubin, Total 0.8 0.0 - 1.2 mg/dL    ALT 39 7 - 45 U/L   Magnesium   Result Value Ref Range    Magnesium 2.21 1.60 - 2.40 mg/dL   Lactate   Result Value Ref Range    Lactate 1.1 0.4 - 2.0 mmol/L   Troponin I, High Sensitivity   Result Value Ref Range    Troponin I, High Sensitivity 911 (HH) 0 - 13 ng/L   Cardiac Enzymes - CPK   Result Value  Ref Range    Creatine Kinase 4,666 (H) 0 - 215 U/L   Urinalysis with Reflex Microscopic   Result Value Ref Range    Color, Urine Yellow Straw, Yellow    Appearance, Urine Clear Clear    Specific Gravity, Urine 1.011 1.005 - 1.035    pH, Urine 7.0 5.0, 5.5, 6.0, 6.5, 7.0, 7.5, 8.0    Protein, Urine 30 (1+) (N) NEGATIVE mg/dL    Glucose, Urine NEGATIVE NEGATIVE mg/dL    Blood, Urine MODERATE (2+) (A) NEGATIVE    Ketones, Urine NEGATIVE NEGATIVE mg/dL    Bilirubin, Urine NEGATIVE NEGATIVE    Urobilinogen, Urine <2.0 <2.0 mg/dL    Nitrite, Urine NEGATIVE NEGATIVE    Leukocyte Esterase, Urine NEGATIVE NEGATIVE   Urinalysis Microscopic Only   Result Value Ref Range    WBC, Urine 1-5 1-5, NONE /HPF    RBC, Urine NONE NONE, 1-2, 3-5 /HPF   Troponin I, High Sensitivity   Result Value Ref Range    Troponin I, High Sensitivity 723 (HH) 0 - 13 ng/L   Sars-CoV-2 PCR, Symptomatic   Result Value Ref Range    Coronavirus 2019, PCR Not Detected Not Detected     CT angio chest for pulmonary embolism    Result Date: 10/3/2023  No acute pulmonary embolism to the segmental level.   Noncalcified pulmonary nodules measuring up to 6 mm in the right lower lobe, similar to 01/10/2022.   Instructions:  Consider follow up non contrast chest CT at 3-6 months, then consider CT chest at 18-24 months.     XR pelvis 1-2 views    Result Date: 10/3/2023  No evidence of acute trauma to the pelvis.         XR chest 1 view    Result Date: 10/3/2023  1.  Somewhat low lung volumes without focal consolidation, pleural effusion or pneumothorax.           CT cervical spine wo IV contrast    Result Date: 10/3/2023  No acute cervical vertebral displacement or acute displaced fracture. Mild spondylolisthesis, moderate compression deformity of C7 and multilevel productive/degenerative changes similar to 12/21/2022.       CT head W O contrast trauma protocol    Result Date: 10/3/2023  No acute intracranial hemorrhage or mass-effect.   Left maxillary sinus  disease.   M         Assessment/Plan   Principal Problem:    Fall, initial encounter  Assessment: Ms. Neha Carranza is an 83 year old female with PMHx of HTN and recurrent falls who presents to [facility] after suffering an unwitnessed fall at home with a down time of up to six hours.      Acute Medical issues:  #Fall 2/2 syncope vs mechanical   #Elevated Creatinine Kinase  #Elevated Troponins  -Patient has history of multiple falls per month due to “dizziness” per patient's daughter   -She states that they have done Vestibular PT and seen Neurology for this issue without any improvement   -Follows with Neurology Dr. Jeffrey Roe   -Had previously been on rivastigmine 1.5mg for mild dementia,  but recently stopped due to dizziness as potential side effect  -Patient was found down in her bedroom at 1PM today, with unknown time down   -She does not remember the event, but was awake, conversive, but confused on discovery   -Denies any tongue biting, loss of bowel or bladder, or any seizure history  -Echo Hx: 3/12/19 Echo showed preserved EF 55-60% with Mild concentric LVH, mild to moderate mitral regurgitation, and mil tricuspid regurgitation   -Elevated temperature and leukocytosis possibly due to having received COVID vaccine yesterday  -EKG: Rate 75, AR Interval 202ms, QRS 130ms, QT /491ms, NSR, LAD, Left BBB  -CK 4,666 in ED  -Troponins 911=>723  -Given 1L NS Bolus in ED    Plan:  -Additional 500mL NS bolus ordered   -Encourage oral intake   -Orthostatic vitals pending  -Troponins downtrending from 911 to 723 and most likely secondary to fall and not true cardiac pathology.  ED discussed case with Dr. Wade who agreed.  -Consider Echo and holter monitor on discharge if cardiogenic causes are high on the differential  -Trend CK with morning labs for improvement  -PT/OT consulted    Chronic medical issues:  #HTN  -Continue home losartan    #Bladder Cancer  -UA positive for protein and blood  -Multiple  surgeries (Jan, March, Aug 2023)  -No chemo, and not on any current medications    Fluids:  prn  Electrolytes: replete prn   Nutrition: regular diet  GI prophylaxis: NA  VTE prophylaxis: Lovenox  Supp O2: Stable on RA    Code Status: Full    Disposition: Admitted due to unwitnessed fall and found down with elevated CK and troponins. PT/OT consulted. Estimated LOS < 48 hours.    Damion Philip MD  PGY-1 Transitional Year

## 2023-10-04 NOTE — PROGRESS NOTES
Physical Therapy    Physical Therapy Evaluation    Patient Name: Neha Carranza  MRN: [id number]  Today's Date: 10/4/2023   Time Calculation  Start Time: 1546  Stop Time: 1610  Time Calculation (min): 24 min    Assessment/Plan   PT Assessment  PT Assessment Results: Decreased strength, Decreased endurance, Impaired balance, Decreased mobility  Rehab Prognosis: Excellent  Evaluation/Treatment Tolerance: Patient tolerated treatment well  Medical Staff Made Aware: Yes  End of Session Communication: Bedside nurse  Assessment Comment: pt demonstrates decreased functional strength, balance, and gait tolerance. Recommend consistent supervision and assist at this time to maintain pt safety. High falls risk as assessed with h/o 3+ falls x6 months. Rec mod despite high AMPAC.  End of Session Patient Position: Up in chair, Alarm on  IP OR SWING BED PT PLAN  Inpatient or Swing Bed: Inpatient  PT Plan  Treatment/Interventions: Bed mobility, Transfer training, Gait training, Balance training, Therapeutic exercise  PT Plan: Skilled PT  PT Frequency: 3 times per week  PT Discharge Recommendations: Moderate intensity level of continued care  PT Recommended Transfer Status: Assist x1      Subjective   General Visit Information:  General  Reason for Referral: 83 YOF admitted with generalized weakness, rhabdo s/p fall  Referred By: ZONIA Philip  Past Medical History Relevant to Rehab: PMH: dementia, recurrent dizziness and + falls, HTN  Family/Caregiver Present: Yes  Caregiver Feedback: dtr expresses interest in skilled PT  upon DC, verbalizes understanding of PT DC recs  Prior to Session Communication: Bedside nurse  Patient Position Received: Up in chair  General Comment: pt sitting up in chair, dtr at bedside and present throughout evaluation. Pt cleared for session per RN; demonstrates some confusion and word-finding difficulties throughout assessment.  Home Living:  Home Living  Type of Home: House  Lives With: Alone (family checks  in 2x weekly, cherished companions 4x weekly)  Home Adaptive Equipment: Walker rolling or standard (rollator)  Home Layout: One level  Home Access: No concerns  Home Living Comments: dtr reports pt is fearful of falling, cauding self limiting behavior related to self care (does not shower, sponge bathes) and ambulation (limited ambulation despite capability)  Prior Level of Function:  Prior Function Per Pt/Caregiver Report  Level of [place]: Independent with ADLs and functional transfers (dtr drives pt to store, assists with IADLs as needed)  Receives Help From: Family  Ambulatory Assistance: Independent (FWW)  Precautions:  Precautions  Medical Precautions: Fall precautions  Vital Signs:       Objective   Pain:  Pain Assessment  Pain Assessment: 0-10  Pain Score: 0 - No pain  Cognition:  Cognition  Overall Cognitive Status: Impaired at baseline  Safety/Judgement: Exceptions to WFL    General Assessments:                          Strength  Strength Comments: B hips 4/5, B knees 4 to 4+/5, B ankles >=3/5 (R LE demonstrates functional weakness/decreased endurance. Decreased swing through noted R LE)                          Dynamic Standing Balance  Dynamic Standing-Balance Support: Bilateral upper extremity supported  Dynamic Standing-Comments: flexed posture, decreased safety with use of FWW  Functional Assessments:       Bed Mobility  Bed Mobility: No    Transfers  Transfer: Yes  Transfer 1  Transfer From 1: Sit to  Transfer to 1: Stand  Technique 1: Sit to stand, Stand to sit  Transfer Device 1: Walker  Transfer Level of Assistance 1: Contact guard  Trials/Comments 1: flexed posture    Ambulation/Gait Training  Ambulation/Gait Training Performed: Yes  Ambulation/Gait Training 1  Surface 1: Level tile  Device 1: Rolling walker  Assistance 1: Contact guard  Quality of Gait 1: Inconsistent stride length  Comments/Distance (ft) 1:  30 feet with directional change. flexed posture; consistent cues needed for walker  safety              Outcome Measures:  [facility] Basic Mobility  Turning from your back to your side while in a flat bed without using bedrails: A little  Moving from lying on your back to sitting on the side of a flat bed without using bedrails: A little  Moving to and from bed to chair (including a wheelchair): A little  Standing up from a chair using your arms (e.g. wheelchair or bedside chair): A little  To walk in hospital room: A little  Climbing 3-5 steps with railing: A lot  Basic Mobility - Total Score: 17    Encounter Problems       Encounter Problems (Active)       Balance       balance (Progressing)       Start:  10/04/23    Expected End:  10/18/23       Pt will demo static/dynamic standing balance x5 minutes with B to U UE support and S/I to improve stability and decrease falls              Mobility       Gait (Progressing)       Start:  10/04/23    Expected End:  10/18/23       3x100 feet with/without use of DME necessary to initiate return to PLOF           strength (Progressing)       Start:  10/04/23    Expected End:  10/18/23       X15+ reps ther ex to increase general strength and improve functional independence               Transfers       transfer (Progressing)       Start:  10/04/23    Expected End:  10/18/23       Pt will complete all functional transfers with S/I necessary to initiate return to PLOF                   Education Documentation  Precautions, taught by Ramona Arredondo PT at 10/4/2023  4:33 PM.  Learner: Family, Patient  Readiness: Acceptance  Method: Explanation  Response: Verbalizes Understanding, Needs Reinforcement  Comment: pt requires reinforcement, family verbalizes understanding    Mobility Training, taught by Ramona Arredondo PT at 10/4/2023  4:33 PM.  Learner: Family, Patient  Readiness: Acceptance  Method: Explanation  Response: Verbalizes Understanding, Needs Reinforcement  Comment: pt requires reinforcement, family verbalizes understanding    Education Comments  No  comments found.

## 2023-10-04 NOTE — CARE PLAN
Problem: Balance  Goal: balance  Description: Pt will demo static/dynamic standing balance x5 minutes with B to U UE support and S/I to improve stability and decrease falls    Outcome: Progressing     Problem: Mobility  Goal: Gait  Description: 3x100 feet with/without use of DME necessary to initiate return to PLOF    Outcome: Progressing  Goal: strength  Description: X15+ reps ther ex to increase general strength and improve functional independence     Outcome: Progressing     Problem: Transfers  Goal: transfer  Description: Pt will complete all functional transfers with S/I necessary to initiate return to PLOF     Outcome: Progressing

## 2023-10-04 NOTE — HOSPITAL COURSE
Brief HPI: Ms. Neha Carranza is an 83 year old female with PMHx of HTN and recurrent falls who presents to [facility] after suffering an unwitnessed fall at home with a down time of up to six hours.     ED Course:   - Vitals: Temp 37.4C (Tmax 38.1), HR 81bpm, SPO2 95%, RR 14, /96 mmHg  - Labs:  - CBC: WBC 11.7, Hb 14.2, Plt 192, Hct 41.9, ANC 8.83  - CMP: Glu 101, Na 134, K 4.3, Cl 99, HCO3 26, BUN 19, Cr .98, Ca 9.1,  , Mg 2.21  - Lactate: 1.1  - CK: 4,666  - Troponin: 911 => 723  - EKG: Rate 75, TN Interval 202ms, QRS 130ms, QT /491ms, NSR, LAD, Left BBB  - UA: 1+ Protein and 2+ blood. Otherwise unremarkable.  - Imaging:               CXR: Somewhat low lung volumes without focal consolidation, pleural effusion, or pneumothorax              Pelvis XRay: No evidence of acute trauma to the pelvis              CT Head: No acute intracranial hemorrhage or mass-effect. Left maxillar sinus disease.              CT C Spine: No acute cervical vertebral displacement or acute displaced fracture. Mild spondylolisthesis, moderate compression deformity of C7 and multilevel productive/degenerative changes similar to 12/21/22              CT PE: No acute pulmonary embolism to the segmental level  - Meds &Fluids: ASA 325mg, Tylenol 650mg, 1L NS bolus    Floor Course: Patient was admitted to floor for continued management. Monitored on tele. She was placed on maintenance fluid of LR @ 75 ml/hr with improvement of her CK levels. Orthostatic vitals were negative. Cardiology was consulted to rule out cardiac causes precipitating fall. Carotid duplex US negative for significant stenosis of carotid arteries. TTE on 10/5 showed EF of 45-50%. Stress test offered to patient, however she ultimately chose to follow outpatient with primary cardiologist Dr. You for stress test and ziopatch. PT/OT evaluated, and pt discharged to SNF with the following recommendations:    Issues to address on follow up:  - Stress  testing  - Zio patch monitoring  - Workup of pulmonary nodules identified on imaging    Follow Ups:  - Cardiology (Dr. You for stress test and ziopatch placement)  - Pulmonology (for follow up of incidental pulmonary nodules)  - PCP

## 2023-10-04 NOTE — CONSULTS
"Inpatient consult to Cardiology  Consult performed by: Meg Frye PA-C  Consult ordered by: Francois Link DO        History Of Present Illness:    Neha Carranza is a 83 y.o. female presenting with fall. Patient was found down on the floor in her bedroom, daughter at bedside reports. Unclear how long she was down. Pt states that she remembers \"waking up\" on the floor. Unsure if she lost consciousness, fell or how she got to the floor. Pt daughter states that Friday the patient stated that \"she felt like she was dying\" but was unable to elaborate any further, continued to act her normal self and had no further complaints. Pt endorses dizziness, states that she is always dizzy. Has seen a neurologist, vestibular therapy, PT and has been unable to determine any solution to this dizziness. She was recently recommended compression stockings but has yet to wear them.      Last Recorded Vitals:  Vitals:    10/04/23 0800 10/04/23 1033 10/04/23 1200 10/04/23 1326   BP: 136/84 123/73 161/69 158/80   BP Location:  Right arm     Pulse:  82  69   Resp:  18  22   Temp:  36.6 °C (97.9 °F)  36.6 °C (97.9 °F)   SpO2:       Weight:       Height:           Last Labs:  CBC - 10/3/2023:  3:41 PM  11.7 14.2 192    41.9      CMP - 10/3/2023:  3:41 PM  9.1 7.1 106 --- 0.8   _ 4.5 39 71      PTT - No results in last year.  _   _ _     Troponin I, High Sensitivity   Date/Time Value Ref Range Status   10/03/2023 10:12  (HH) 0 - 13 ng/L Final     Comment:     Previous result verified on 10/3/2023 1638 on specimen/case [id number] called with component TRPHS for procedure Troponin I, High Sensitivity with value 911 ng/L.   10/03/2023 05:22  (HH) 0 - 13 ng/L Final     Comment:     Previous result verified on 10/3/2023 1638 on specimen/case [id number] called with component TRPHS for procedure Troponin I, High Sensitivity with value 911 ng/L.   10/03/2023 03:41  (HH) 0 - 13 ng/L Final     Hemoglobin A1C " "  Date/Time Value Ref Range Status   04/08/2022 10:36 AM 5.2 4.3 - 5.6 % Final     Comment:     American Diabetes Association guidelines indicate that patients with HgbA1c in the range 5.7-6.4% are at increased risk for development of diabetes, and intervention by lifestyle modification may be beneficial. HgbA1c greater or equal to 6.5% is considered diagnostic of diabetes.   07/27/2018 10:45 AM 5.2 4.0 - 6.0 % Final     Comment:     Hemoglobin A1C levels are related to mean blood glucose during the   preceding 2-3 months. The relationship table below may be used as a   general guide. Each 1% increase in HGB A1C is a reflection of an   increase in mean glucose of approximately 30 mg/dl.   Reference: Diabetes Care, volume 29, supplement 1 Jan. 2006                        HGB A1C ................. Approx. Mean Glucose   _______________________________________________   6%   ...............................  120 mg/dl   7%   ...............................  150 mg/dl   8%   ...............................  180 mg/dl   9%   ...............................  210 mg/dl   10%  ...............................  240 mg/dl  Performed at [facility] [id number] [place] [id number]        Last I/O:  I/O last 3 completed shifts:  In: 1500 (27.3 mL/kg) [IV Piggyback:1500]  Out: - (0 mL/kg)   Weight: 55 kg     Past Cardiology Tests (Last 3 Years):  EKG:  No results found for this or any previous visit from the past 1095 days.    Echo:  2019: Mild concentric LVH, LVSF normal, EF 55-59%, mild-moderate MR, mild TR.     Ejection Fractions:  No results found for: \"EF\"    Cath:  No results found for this or any previous visit from the past 1095 days.    Stress Test:  No results found for this or any previous visit from the past 1095 days.    Cardiac Imaging:  No results found for this or any previous visit from the past 1095 days.      Past Medical History:  She has a past medical history of Encounter for screening mammogram for " malignant neoplasm of breast, Other conditions influencing health status, Personal history of other diseases of the circulatory system, Personal history of other diseases of the musculoskeletal system and connective tissue, and Personal history of other specified conditions.    Past Surgical History:  She has a past surgical history that includes Colonoscopy (07/12/2013); Other surgical history (07/12/2013); and US guided abscess drain (8/12/2022).      Social History:  She has no history on file for tobacco use, alcohol use, and drug use.    Family History:  Family History   Problem Relation Name Age of Onset    Heart disease Mother      Heart failure Mother      Heart disease Father      Heart failure Father      Parkinsonism Sister          Allergies:  Tramadol, Ciprofloxacin, and Esomeprazole    Inpatient Medications:  Scheduled medications   Medication Dose Route Frequency    aspirin  81 mg oral Daily    enoxaparin  40 mg subcutaneous q24h    losartan  25 mg oral Nightly     PRN medications   Medication    acetaminophen    Or    acetaminophen    Or    acetaminophen     Continuous Medications   Medication Dose Last Rate    lactated Ringer's  75 mL/hr       Outpatient Medications:  Current Outpatient Medications   Medication Instructions    aspirin (ASPIR-81 ORAL) Take 1 tablet by mouth once daily at bedtime.    losartan (COZAAR) 25 mg, oral, Nightly       Physical Exam:  Physical Exam  HENT:      Head: Normocephalic.      Nose: Nose normal.      Mouth/Throat:      Mouth: Mucous membranes are moist.   Eyes:      Conjunctiva/sclera: Conjunctivae normal.   Neck:      Comments: No JVD    Cardiovascular:      Rate and Rhythm: Normal rate and regular rhythm.      Heart sounds: No murmur heard.     No friction rub. No gallop.   Pulmonary:      Effort: Pulmonary effort is normal. No respiratory distress.      Breath sounds: No stridor. No wheezing or rales.   Abdominal:      Palpations: Abdomen is soft.    Musculoskeletal:         General: Normal range of motion.   Skin:     General: Skin is warm and dry.   Neurological:      General: No focal deficit present.      Mental Status: She is alert.   Psychiatric:         Mood and Affect: Mood normal.         Behavior: Behavior normal.            Assessment/Plan   Neha Carranza is an 82 yo female with a PMH of chronic dizziness, CVA, HTN, HLD, OA, Seizure disorder, syncope who presented after being found down on the floor @ home. Noted to have elevated CK and troponin level.      #Syncope vs mechanical fall  #Non-MI troponin elevation 2/2 rhabdomyolysis, fall  #Chronic dizziness  #HTN  #HLD    -Trop 723 >633; CK 3433   -Monitor on tele  -Check orthostatic BP  -Recommend echo  -NPO @ midnight for potential stress  -C/w home ASA, Losartan  -Will follow     Code Status:  Full Code        Meg Frye PA-C

## 2023-10-05 ENCOUNTER — APPOINTMENT (OUTPATIENT)
Dept: CARDIOLOGY | Facility: HOSPITAL | Age: 83
DRG: 565 | End: 2023-10-05
Payer: MEDICARE

## 2023-10-05 ENCOUNTER — APPOINTMENT (OUTPATIENT)
Dept: VASCULAR MEDICINE | Facility: HOSPITAL | Age: 83
DRG: 565 | End: 2023-10-05
Payer: MEDICARE

## 2023-10-05 LAB
ALBUMIN SERPL BCP-MCNC: 3.5 G/DL (ref 3.4–5)
ANION GAP SERPL CALC-SCNC: 11 MMOL/L (ref 10–20)
BUN SERPL-MCNC: 12 MG/DL (ref 6–23)
CALCIUM SERPL-MCNC: 8.3 MG/DL (ref 8.6–10.3)
CHLORIDE SERPL-SCNC: 105 MMOL/L (ref 98–107)
CK SERPL-CCNC: 2299 U/L (ref 0–215)
CO2 SERPL-SCNC: 25 MMOL/L (ref 21–32)
CREAT SERPL-MCNC: 0.93 MG/DL (ref 0.5–1.05)
ERYTHROCYTE [DISTWIDTH] IN BLOOD BY AUTOMATED COUNT: 12.5 % (ref 11.5–14.5)
GFR SERPL CREATININE-BSD FRML MDRD: 61 ML/MIN/1.73M*2
GLUCOSE SERPL-MCNC: 90 MG/DL (ref 74–99)
HCT VFR BLD AUTO: 38.8 % (ref 36–46)
HGB BLD-MCNC: 12.9 G/DL (ref 12–16)
MAGNESIUM SERPL-MCNC: 1.92 MG/DL (ref 1.6–2.4)
MCH RBC QN AUTO: 32.5 PG (ref 26–34)
MCHC RBC AUTO-ENTMCNC: 33.2 G/DL (ref 32–36)
MCV RBC AUTO: 98 FL (ref 80–100)
NRBC BLD-RTO: 0 /100 WBCS (ref 0–0)
PHOSPHATE SERPL-MCNC: 2.4 MG/DL (ref 2.5–4.9)
PLATELET # BLD AUTO: 180 X10*3/UL (ref 150–450)
PMV BLD AUTO: 11 FL (ref 7.5–11.5)
POTASSIUM SERPL-SCNC: 3.6 MMOL/L (ref 3.5–5.3)
RBC # BLD AUTO: 3.97 X10*6/UL (ref 4–5.2)
SODIUM SERPL-SCNC: 137 MMOL/L (ref 136–145)
WBC # BLD AUTO: 7.5 X10*3/UL (ref 4.4–11.3)

## 2023-10-05 PROCEDURE — 85027 COMPLETE CBC AUTOMATED: CPT

## 2023-10-05 PROCEDURE — 80069 RENAL FUNCTION PANEL: CPT

## 2023-10-05 PROCEDURE — 36415 COLL VENOUS BLD VENIPUNCTURE: CPT | Performed by: INTERNAL MEDICINE

## 2023-10-05 PROCEDURE — 2060000001 HC INTERMEDIATE ICU ROOM DAILY

## 2023-10-05 PROCEDURE — 93880 EXTRACRANIAL BILAT STUDY: CPT

## 2023-10-05 PROCEDURE — 93306 TTE W/DOPPLER COMPLETE: CPT

## 2023-10-05 PROCEDURE — 2500000001 HC RX 250 WO HCPCS SELF ADMINISTERED DRUGS (ALT 637 FOR MEDICARE OP)

## 2023-10-05 PROCEDURE — 93880 EXTRACRANIAL BILAT STUDY: CPT | Performed by: INTERNAL MEDICINE

## 2023-10-05 PROCEDURE — 93306 TTE W/DOPPLER COMPLETE: CPT | Performed by: INTERNAL MEDICINE

## 2023-10-05 PROCEDURE — 36415 COLL VENOUS BLD VENIPUNCTURE: CPT

## 2023-10-05 PROCEDURE — 97165 OT EVAL LOW COMPLEX 30 MIN: CPT | Mod: GO

## 2023-10-05 PROCEDURE — 99232 SBSQ HOSP IP/OBS MODERATE 35: CPT

## 2023-10-05 PROCEDURE — 82550 ASSAY OF CK (CPK): CPT | Performed by: INTERNAL MEDICINE

## 2023-10-05 PROCEDURE — 83735 ASSAY OF MAGNESIUM: CPT

## 2023-10-05 RX ADMIN — LOSARTAN POTASSIUM 25 MG: 50 TABLET, FILM COATED ORAL at 21:51

## 2023-10-05 RX ADMIN — ASPIRIN 81 MG CHEWABLE TABLET 81 MG: 81 TABLET CHEWABLE at 09:55

## 2023-10-05 ASSESSMENT — COGNITIVE AND FUNCTIONAL STATUS - GENERAL
HELP NEEDED FOR BATHING: A LITTLE
STANDING UP FROM CHAIR USING ARMS: A LOT
DRESSING REGULAR UPPER BODY CLOTHING: A LITTLE
PERSONAL GROOMING: A LITTLE
EATING MEALS: A LITTLE
DAILY ACTIVITIY SCORE: 15
TOILETING: A LITTLE
DAILY ACTIVITIY SCORE: 20
MOBILITY SCORE: 16
HELP NEEDED FOR BATHING: A LOT
WALKING IN HOSPITAL ROOM: A LOT
DRESSING REGULAR LOWER BODY CLOTHING: A LOT
DRESSING REGULAR LOWER BODY CLOTHING: A LITTLE
DRESSING REGULAR UPPER BODY CLOTHING: A LOT
TOILETING: A LITTLE
CLIMB 3 TO 5 STEPS WITH RAILING: A LOT
MOVING TO AND FROM BED TO CHAIR: A LOT

## 2023-10-05 ASSESSMENT — ACTIVITIES OF DAILY LIVING (ADL)
ASSISTIVE_DEVICE: COMMODE;WALKER
HEARING - LEFT EAR: DIFFICULTY WITH NOISE
GROOMING: INDEPENDENT
PATIENT'S MEMORY ADEQUATE TO SAFELY COMPLETE DAILY ACTIVITIES?: NO
BATHING: NEEDS ASSISTANCE
DRESSING YOURSELF: NEEDS ASSISTANCE
GROOMING: INDEPENDENT
HEARING - RIGHT EAR: DIFFICULTY WITH NOISE
HEARING - RIGHT EAR: DIFFICULTY WITH NOISE
FEEDING YOURSELF: INDEPENDENT
WALKS IN HOME: NEEDS ASSISTANCE
TOILETING: NEEDS ASSISTANCE
PATIENT'S MEMORY ADEQUATE TO SAFELY COMPLETE DAILY ACTIVITIES?: NO
DRESSING YOURSELF: NEEDS ASSISTANCE
BATHING: NEEDS ASSISTANCE
JUDGMENT_ADEQUATE_SAFELY_COMPLETE_DAILY_ACTIVITIES: NO
ASSISTIVE_DEVICE: WALKER;COMMODE
ADL_ASSISTANCE: INDEPENDENT
ADEQUATE_TO_COMPLETE_ADL: NO
JUDGMENT_ADEQUATE_SAFELY_COMPLETE_DAILY_ACTIVITIES: NO
FEEDING YOURSELF: INDEPENDENT
TOILETING: NEEDS ASSISTANCE
ADEQUATE_TO_COMPLETE_ADL: NO
WALKS IN HOME: NEEDS ASSISTANCE
HEARING - LEFT EAR: DIFFICULTY WITH NOISE

## 2023-10-05 ASSESSMENT — PAIN - FUNCTIONAL ASSESSMENT: PAIN_FUNCTIONAL_ASSESSMENT: 0-10

## 2023-10-05 ASSESSMENT — PAIN SCALES - GENERAL: PAINLEVEL_OUTOF10: 0 - NO PAIN

## 2023-10-05 ASSESSMENT — ENCOUNTER SYMPTOMS
RESPIRATORY NEGATIVE: 1
CONSTITUTIONAL NEGATIVE: 1
GASTROINTESTINAL NEGATIVE: 1
CARDIOVASCULAR NEGATIVE: 1
NEUROLOGICAL NEGATIVE: 1

## 2023-10-05 NOTE — PROGRESS NOTES
Occupational Therapy                 Therapy Communication Note    Patient Name: Neha Carranza  MRN: [id number]  Today's Date: 10/5/2023     Discipline: Occupational Therapy    Missed Visit Reason: Missed Visit Reason: Other (Comment) (Pt being transported down for echo.)    Missed Time: Attempt

## 2023-10-05 NOTE — PROGRESS NOTES
Subjective Data:  Patient reports that she feels fine. Denies any chest pain, chest pressure, palpitations, dizziness, cough, shortness of breath, orthopnea, edema.    Overnight Events:    No acute issues.      Objective Data:  Last Recorded Vitals:  Vitals:    10/04/23 1200 10/04/23 1326 10/04/23 2238 10/05/23 1420   BP: 161/69 158/80 132/86 164/80   BP Location:       Pulse:  69 83 69   Resp:  22 20 20   Temp:  36.6 °C (97.9 °F) 36.4 °C (97.5 °F) 36.7 °C (98 °F)   SpO2:  95% 95% 97%   Weight:       Height:           Last Labs:  CBC - 10/5/2023:  8:56 AM  7.5 12.9 180    38.8      CMP - 10/5/2023:  8:56 AM  8.3 6.3 97 --- 0.5   2.4 3.5 39 58      PTT - No results in last year.  _   _ _     TROPHS   Date/Time Value Ref Range Status   10/03/2023 10:12  0 - 13 ng/L Final     Comment:     Previous result verified on 10/3/2023 1638 on specimen/case [id number] called with component TRPHS for procedure Troponin I, High Sensitivity with value 911 ng/L.   10/03/2023 05:22  0 - 13 ng/L Final     Comment:     Previous result verified on 10/3/2023 1638 on specimen/case [id number] called with component TRPHS for procedure Troponin I, High Sensitivity with value 911 ng/L.   10/03/2023 03:41  0 - 13 ng/L Final     HGBA1C   Date/Time Value Ref Range Status   04/08/2022 10:36 AM 5.2 4.3 - 5.6 % Final     Comment:     American Diabetes Association guidelines indicate that patients with HgbA1c in the range 5.7-6.4% are at increased risk for development of diabetes, and intervention by lifestyle modification may be beneficial. HgbA1c greater or equal to 6.5% is considered diagnostic of diabetes.   07/27/2018 10:45 AM 5.2 4.0 - 6.0 % Final     Comment:     Hemoglobin A1C levels are related to mean blood glucose during the   preceding 2-3 months. The relationship table below may be used as a   general guide. Each 1% increase in HGB A1C is a reflection of an   increase in mean glucose of approximately 30  "mg/dl.   Reference: Diabetes Care, volume 29, supplement 1 Jan. 2006                        HGB A1C ................. Approx. Mean Glucose   _______________________________________________   6%   ...............................  120 mg/dl   7%   ...............................  150 mg/dl   8%   ...............................  180 mg/dl   9%   ...............................  210 mg/dl   10%  ...............................  240 mg/dl  Performed at [place] [address]        Last I/O:  I/O last 3 completed shifts:  In: 500 (9.1 mL/kg) [IV Piggyback:500]  Out: 1 (0 mL/kg) [Stool:1]  Weight: 55 kg     Past Cardiology Tests (Last 3 Years):  EKG:  No results found for this or any previous visit from the past 1095 days.    Echo:  2019: Mild concentric LVH, LVSF normal, EF 55-59%, mild-moderate MR, mild TR.     Ejection Fractions:  No results found for: \"EF\"  Cath:  No results found for this or any previous visit from the past 1095 days.    Stress Test:  No results found for this or any previous visit from the past 1095 days.    Cardiac Imaging:  No results found for this or any previous visit from the past 1095 days.      Inpatient Medications:  Scheduled medications   Medication Dose Route Frequency    ARIPiprazole  10 mg oral Nightly    aspirin  81 mg oral Daily    enoxaparin  40 mg subcutaneous q24h    losartan  25 mg oral Nightly     PRN medications   Medication    acetaminophen    Or    acetaminophen    Or    acetaminophen    OLANZapine     Continuous Medications   Medication Dose Last Rate    lactated Ringer's  75 mL/hr 75 mL/hr (10/04/23 1424)       Physical Exam:  Physical Exam  Constitutional:       Appearance: Normal appearance.   HENT:      Head: Normocephalic.   Eyes:      Conjunctiva/sclera: Conjunctivae normal.   Neck:      Comments: No JVD  Cardiovascular:      Rate and Rhythm: Normal rate and regular rhythm.      Heart sounds: No murmur heard.     No friction rub. No gallop. "   Pulmonary:      Effort: Pulmonary effort is normal. No respiratory distress.      Breath sounds: No wheezing, rhonchi or rales.   Abdominal:      Palpations: Abdomen is soft.   Musculoskeletal:         General: No swelling. Normal range of motion.      Cervical back: Normal range of motion.   Skin:     General: Skin is warm and dry.   Neurological:      General: No focal deficit present.      Mental Status: She is alert. Mental status is at baseline. She is disoriented.   Psychiatric:         Mood and Affect: Mood normal.         Behavior: Behavior normal.            Assessment/Plan   Neha Carranza is an 84 yo female with a PMH of chronic dizziness, CVA, HTN, HLD, OA, Seizure disorder, syncope who presented after being found down on the floor @ home. Noted to have elevated CK and troponin level.      #Syncope vs mechanical fall  #Non-MI troponin elevation 2/2 rhabdomyolysis, fall  #Chronic dizziness  #HTN  #HLD     -Trop 723 >633; CK 3433 > 2955   -Monitor on tele  -Check orthostatic BP pending  -Recommend echo, pending review   -NPO at midnight, stress test pending echocardiogram review.   -C/w home ASA, Losartan  -Will follow     Peripheral IV 10/05/23 22 G Left;Ventral Forearm (Active)   Site Assessment Clean;Intact;Dry 10/05/23 1243   Dressing Status Clean;Dry 10/05/23 1243   Number of days: 0       Code Status:  Full Code        Meg Frye PA-C

## 2023-10-05 NOTE — NURSING NOTE
Patient pulled IV out and refused to let any staff put a new one in. Patient stating she does not trust us and that we are not nurses. Daughter Latonia at bedside to witness as well. Passed on to night shift they will try again.

## 2023-10-05 NOTE — PROGRESS NOTES
Occupational Therapy    Evaluation    Patient Name: Neha Carranza  MRN: [id number]  Today's Date: 10/5/2023  Time Calculation  Start Time: 1420  Stop Time: 1438  Time Calculation (min): 18 min    Assessment  IP OT Assessment  OT Assessment: Pt is a 84 y/o woman who was admitted for generalized weakness and rhabdo s/p fall  Prognosis: Good  Barriers to Discharge: None  Evaluation/Treatment Tolerance: Patient tolerated treatment well  Medical Staff Made Aware: Yes  Plan:  Treatment Interventions: ADL retraining, Functional transfer training, Endurance training  OT Frequency: 2 times per week  OT Discharge Recommendations: Low intensity level of continued care, 24 hr supervision due to cognition  Equipment Recommended upon Discharge: Wheeled walker  OT Recommended Transfer Status: Assist of 1 (CGA with FWW)    Subjective   Current Problem:  1. Fall, initial encounter        2. Closed head injury, initial encounter        3. Generalized weakness        4. Elevated troponin        5. Traumatic rhabdomyolysis, initial encounter (CMS/[facility])        6. Syncope, unspecified syncope type  Transthoracic Echo (TTE) Complete    Transthoracic Echo (TTE) Complete    Vascular US carotid artery duplex bilateral    Vascular US carotid artery duplex bilateral      7. Cardiovascular symptoms        8. Chest pain, unspecified type  Nuclear Stress Test    Nuclear Stress Test    Cardiology Interpretation Of Nuclear Stress - See Other Report For Nuclear Portion    Cardiology Interpretation Of Nuclear Stress - See Other Report For Nuclear Portion        General:  General  Reason for Referral: Pt is a 84 y/o woman admitted with generalized weakness and rhabdo s/p fall  Past Medical History Relevant to Rehab: PMH: dementia, recurrent dizziness and + falls, HTN  Family/Caregiver Present: Yes  Prior to Session Communication: Bedside nurse  Patient Position Received: Up in chair, Alarm on  General Comment: RN gave clearance to work with the  patient. Pt was sitting up in the chair with her daughter present. Pt was agreeable to OT kati. Pt presents with deficits in her cognition and word finding abilities. Pt was able to ambulate and complete ADL's at CGA.  Precautions:  Medical Precautions: Fall precautions (IV, alarms)    Pain:  Pain Assessment  Pain Assessment: 0-10  Pain Score: 0 - No pain    Objective   Cognition:  Overall Cognitive Status: Impaired at baseline  Orientation Level: Other (Comment) (Oriented to self and place)  Memory: Exceptions to WFL  Memory Comments:  (Impaired memory noted)  Problem Solving: Exceptions to WFL  Safety/Judgement: Exceptions to WFL     Home Living:  Type of Home: House  Lives With: Alone (family checks in 2x weekly, cherished companions 4x weekly)  Home Adaptive Equipment: Other (Comment) (rollator)  Home Layout: One level  Home Access: No concerns  Bathroom Shower/Tub: Walk-in shower, Tub/shower unit  Bathroom Toilet: Handicapped height  Bathroom Equipment: Grab bars in shower, Grab bars around toilet, Shower chair with back   Prior Function:  Level of [place]: Independent with ADLs and functional transfers, Needs assistance with homemaking  Receives Help From: Family  ADL Assistance: Independent  Homemaking Assistance: Needs assistance  Prior Function Comments: Pt reports she is independent with ADL's using rollator. Pt sponge bathes. Pt's family provides meals. Pt also has a cleaning lady. Pt does her own laundry and cleaning dishes.  ADL:  Toileting Assistance with Device:  (CGA with FWW)  Toileting Deficit: Steadying, Verbal cueing, Supervison/safety  Activity Tolerance:  Endurance: Tolerates less than 10 min exercise, no significant change in vital signs (Pt ambulated with FWW to and from the bathroom at [facility] with cues to keep her feet within the walker and safety)  Bed Mobility/Transfers:     and Transfers  Transfer: Yes  Transfer 1  Transfer From 1: Chair with arms to  Transfer to 1: Stand  Technique 1:  Sit to stand  Transfer Device 1: Walker  Transfer Level of Assistance 1:  (CGA)  Trials/Comments 1: cues for hand placement  Transfers 2  Transfer From 2: Chair with arms to  Transfer to 2: Sit  Technique 2: Stand to sit  Transfer Device 2: Walker  Transfer Level of Assistance 2:  (CGA)  Trials/Comments 2: cues for hand placement  Transfers 3  Transfer From 3: Toilet to  Transfer to 3: Toilet  Technique 3: Sit to stand, Stand to sit  Transfer Device 3: Walker  Transfer Level of Assistance 3:  (CGA)     Outcome Measures: [facility] Daily Activity  Putting on and taking off regular lower body clothing: A little  Bathing (including washing, rinsing, drying): A little  Putting on and taking off regular upper body clothing: A little  Toileting, which includes using toilet, bedpan or urinal: A little  Taking care of personal grooming such as brushing teeth: None  Eating Meals: None  Daily Activity - Total Score: 20      Education Documentation  OT Plan of Care, taught by Ansley Davis OT at 10/5/2023  3:16 PM.  Learner: Family, Patient  Readiness: Acceptance  Method: Explanation  Response: Verbalizes Understanding  Comment: Pt educated on OT's role and POC    Body Mechanics, taught by Ansley Davis OT at 10/5/2023  3:15 PM.  Learner: Family, Patient  Readiness: Acceptance  Method: Explanation  Response: Verbalizes Understanding, Needs Reinforcement    Education Comments  No comments found.    Goals:   Encounter Problems       Encounter Problems (Active)       Balance       Pt will demo good static/dynamic standing balance during ADL and functional activity with FWW for improved independence and participation in ADL  (Progressing)       Start:  10/05/23    Expected End:  10/19/23               Dressings Lower Extremities       Patient to complete lower body dressing with FWW at mod I  (Progressing)       Start:  10/05/23    Expected End:  10/19/23                     Mobility       Pt will demo increased  functional mobility to tolerate tasks necessary to complete ADL routine with FWW at mod I  (Progressing)       Start:  10/05/23    Expected End:  10/19/23               Toileting       Patient will complete toileting tasks with FWW at mod I  (Progressing)       Start:  10/05/23    Expected End:  10/19/23                     Transfers       Pt will complete transfers with FWW at mod I  (Progressing)       Start:  10/05/23    Expected End:  10/19/23

## 2023-10-05 NOTE — CARE PLAN
POC  Problem: Balance  Goal: Pt will demo good static/dynamic standing balance during ADL and functional activity with FWW for improved independence and participation in ADL   Outcome: Progressing     Problem: Dressings Lower Extremities  Goal: Patient to complete lower body dressing with FWW at mod I   Outcome: Progressing     Problem: Mobility  Goal: Pt will demo increased functional mobility to tolerate tasks necessary to complete ADL routine with FWW at mod I   Outcome: Progressing     Problem: Toileting  Goal: Patient will complete toileting tasks with FWW at mod I   Outcome: Progressing     Problem: Transfers  Goal: Pt will complete transfers with FWW at mod I   Outcome: Progressing

## 2023-10-05 NOTE — PROGRESS NOTES
10/05/23 1500   Discharge Planning   Living Arrangements Alone  (Has caregiver monday through saturday; daughter lives 15 minutes away and checks in on patient often)   Support Systems Children;Family members;Friends/neighbors   Assistance Needed Assistance needed for ADLs   Type of Residence Private residence   Who is requesting discharge planning? Provider   Home or Post Acute Services   (SNF VS HHC- PT/OT EVALS PENDING)   Patient expects to be discharged to:   (TBD)   Does the patient need discharge transport arranged? Yes   RoundTrip coordination needed? Yes   Has discharge transport been arranged? No

## 2023-10-05 NOTE — PROGRESS NOTES
Internal Medicine-Daily Progress Note         Chief Complaint     Chief Complaint   Patient presents with   • Fall   • Weakness, Gen     Had COVID vaccine yesterday, found on the ground at [facility] where she lives. Pt is A&OX2-3 at baseline but recently has been showing signs of dementia. Pt denies any c/o, poor historian. Pt's daughter at bedside.        Subjective    - Patient seen at bedside with daughter  - Was more interactive and calm  - Endorsed feeling better      ROS  Review of Systems   Constitutional: Negative.    Respiratory: Negative.     Cardiovascular: Negative.    Gastrointestinal: Negative.    Neurological: Negative.             Meds    Scheduled medications  ARIPiprazole, 10 mg, oral, Nightly  aspirin, 81 mg, oral, Daily  enoxaparin, 40 mg, subcutaneous, q24h  losartan, 25 mg, oral, Nightly      Continuous medications  lactated Ringer's, 75 mL/hr, Last Rate: 75 mL/hr (10/05/23 1608)      PRN medications  PRN medications: acetaminophen **OR** acetaminophen **OR** acetaminophen, OLANZapine     Objective    Physical Exam  Constitutional:       Appearance: Normal appearance.   HENT:      Head: Normocephalic and atraumatic.      Mouth/Throat:      Mouth: Mucous membranes are moist.   Cardiovascular:      Rate and Rhythm: Normal rate. Rhythm irregular.      Heart sounds: No murmur heard.     No friction rub. No gallop.   Pulmonary:      Breath sounds: Normal breath sounds. No stridor. No wheezing or rhonchi.   Abdominal:      General: Abdomen is flat. Bowel sounds are normal.      Palpations: Abdomen is soft. There is no mass.      Tenderness: There is no abdominal tenderness. There is no guarding.   Musculoskeletal:         General: No swelling or tenderness. Normal range of motion.      Cervical back: Normal range of motion.   Neurological:      Mental Status: She is alert.        Visit Vitals  /80 Comment: notified RN   Pulse 69   Temp 36.7 °C (98 °F)   Resp 20   Ht 1.524 m (5')  "  Wt 55 kg (121 lb 4.1 oz)   SpO2 97%   BMI 23.68 kg/m²   Smoking Status Never Assessed   BSA 1.53 m²        Intake/Output Summary (Last 24 hours) at 10/5/2023 1754  Last data filed at 10/5/2023 1608  Gross per 24 hour   Intake 1000 ml   Output --   Net 1000 ml       Vent settings:         Labs:   Results from last 72 hours   Lab Units 10/05/23  0856 10/04/23  1612 10/03/23  1541   SODIUM mmol/L 137 136 134*   POTASSIUM mmol/L 3.6 4.0 4.3   CHLORIDE mmol/L 105 104 99   CO2 mmol/L 25 24 26   BUN mg/dL 12 17 19   CREATININE mg/dL 0.93 0.96 0.98   GLUCOSE mg/dL 90 158* 101*   CALCIUM mg/dL 8.3* 8.0* 9.1   ANION GAP mmol/L 11 12 13   EGFR mL/min/1.73m*2 61 58* 57*   PHOSPHORUS mg/dL 2.4*  --   --       Results from last 72 hours   Lab Units 10/05/23  0856 10/04/23  1612 10/03/23  1541   WBC AUTO x10*3/uL 7.5 9.1 11.7*   HEMOGLOBIN g/dL 12.9 13.5 14.2   HEMATOCRIT % 38.8 41.5 41.9   PLATELETS AUTO x10*3/uL 180 188 192   NEUTROS PCT AUTO %  --   --  75.9   LYMPHS PCT AUTO %  --   --  13.0   MONOS PCT AUTO %  --   --  10.1   EOS PCT AUTO %  --   --  0.4      Lab Results   Component Value Date    CALCIUM 8.3 (L) 10/05/2023    PHOS 2.4 (L) 10/05/2023      No results found for: \"CRP\"       Micro/ID:   No results found for the last 90 days.                   No lab exists for component: \"AGALPCRNB\"     No results found for: \"URINECULTURE\", \"BLOODCULT\", \"CSFCULTSMEAR\"    Images    Vascular US carotid artery duplex bilateral    Result Date: 10/5/2023  Preliminary Cardiology Report         [place] [address]  Tel [phone] and Fax [phone]  Preliminary Vascular Lab Report  Carotid Artery Duplex Ultrasound  Patient Name:     JAYLEEN CONDE KEMAR Reading Physician:   [id number] Latonia Daly MD, RPVI Study Date:       10/5/2023           Referring Physician: [id number] SHAHRAM ESPANA MRN/PID:          [id number]            PCP: " Accession/Order#: [id number]        CC Report to: YOB: 1940           Technologist:        Kary Perez RVZONIA Gender:           F                   Technologist 2: Admission Status: Outpatient          Location Performed:  [facility]  Diagnosis/ICD: Syncope and collapse-R55  PRELIMINARY CONCLUSIONS: Right Carotid: Findings are consistent with less than 50% stenosis of the right proximal internal carotid artery. Laminar flow seen by color Doppler. Right external carotid artery appears patent with no evidence of stenosis. The right vertebral artery is patent with antegrade flow. No evidence of hemodynamically significant stenosis in the right subclavian artery. Tortuous right ICA noted. Left Carotid: Findings are consistent with less than 50% stenosis of the left proximal internal carotid artery. Laminar flow seen by color Doppler. Left external carotid artery appears patent with no evidence of stenosis. The left vertebral artery is patent with antegrade flow. No evidence of hemodynamically significant stenosis in the left subclavian artery.  Imaging & Doppler Findings: Right Plaque Morph: The proximal right internal carotid artery demonstrates heterogenous and calcified plaque. The proximal right external carotid artery demonstrates heterogenous plaque. The distal right common carotid artery demonstrates heterogenous and calcified plaque. Left Plaque Morph: The proximal left internal carotid artery demonstrates heterogenous and calcified plaque. The proximal left external carotid artery demonstrates heterogenous plaque. The distal left common carotid artery demonstrates heterogenous plaque.   Right                        Left   PSV      EDV                PSV     EDV 83 cm/s            CCA P    74 cm/s 54 cm/s            CCA D    68 cm/s 47 cm/s  10 cm/s   ICA P    52 cm/s  9 cm/s 100 cm/s           ICA M    83 cm/s 84 cm/s            ICA D    59 cm/s 97 cm/s             ECA     62 cm/s  46 cm/s          Vertebral  54 cm/s 137 cm/s         Subclavian 132 cm/s                Right Left ICA/CCA Ratio  0.9  0.8   VASCULAR PRELIMINARY REPORT completed by Kary Perez RVT on 10/5/2023 at 8:50:35 AM  ** Final **     CT angio chest for pulmonary embolism    Result Date: 10/3/2023  Interpreted By:  Philomena Pack, STUDY: CT ANGIO CHEST FOR PULMONARY EMBOLISM;  10/3/2023 8:40 pm   INDICATION: Signs/Symptoms:syncope, fall elevated troponin.   COMPARISON: 01/10/2022   ACCESSION NUMBER(S): [id number]   ORDERING CLINICIAN: MARIA T PENA   TECHNIQUE: Contiguous axial images of the chest were obtained after the intravenous administration of 54 mL Omnipaque 350 using angiographic PE protocol. Coronal and sagittal reformatted images were reconstructed from the axial data. MIP images were created and reviewed.   FINDINGS: MEDIASTINUM AND LYMPH NODES: No enlarged intrathoracic or axillary lymph nodes.  No pneumomediastinum.   VESSELS:  Normal caliber aorta with mild aortic atherosclerosis.  No pulmonary embolism identified to the segmental level.   HEART: Cardiomegaly. Coronary artery calcifications noted however exam is not optimized for evaluation. Small pericardial effusion. Mild wall thickening/the esophagus suspected. The   LUNG, AIRWAYS, AND PLEURA: Central airways are patent. Bibasilar interstitial opacities and scarring. Punctate calcifications, likely sequela of remote granulomatous disease. Noncalcified pulmonary nodules including a 2 mm nodule in the left lower lobe and 6 mm noncalcified nodule in the right lower lobe no focal consolidation, pleural effusion or sizable pneumothorax seen.   OSSEOUS STRUCTURES/CHEST WALL: Degenerative changes and levoscoliosis of the thoracolumbar spine. No acute osseous abnormality.   UPPER ABDOMEN/OTHER: Punctate splenic calcifications, likely sequela of remote granulomatous disease. Partially imaged peripherally calcified cystic lesion possibly arising from the left  kidney, visualized on CT abdomen 12/21/2022       No acute pulmonary embolism to the segmental level.   Noncalcified pulmonary nodules measuring up to 6 mm in the right lower lobe, similar to 01/10/2022.   Instructions:  Consider follow up non contrast chest CT at 3-6 months, then consider CT chest at 18-24 months. (Marlon Nails et al., Guidelines for management of incidental pulmonary nodules detected on CT images: From the Fleischner Society 2017, Radiology. 2017 Jul;284 (1):228-243.) FLEISCHNER.ACR.[email or web address]   Mild hyperinflated lung fields, cardiomegaly and possible fibrosis.   Partially imaged cystic partially calcified left renal lesion better delineated on dedicated abdominal imaging. Continued follow-up recommended. Renal MRI with contrast may be considered as clinically indicated.   MACRO: Incidental Finding:  Multiple solid non-calcified pulmonary nodules measuring up to 6-8 mm.  (**-YCF-**)   Critical Finding:  There are multiple incidental findings. See findings. notification was initiated on 10/3/2023 at 9:11 pm by Philomena Pack.  (**-YCF-**) Instructions:   Signed by: Philomena Pack 10/3/2023 9:11 PM Dictation workstation:   [id number]    XR pelvis 1-2 views    Result Date: 10/3/2023  Interpreted By:  Kamiin Vicente, STUDY: XR PELVIS 1-2 VIEWS; ;  10/3/2023 4:37 pm   INDICATION: Signs/Symptoms:fall could not get up.   COMPARISON: CT abdomen and pelvis dated 12/21/2022.   ACCESSION NUMBER(S): [id number]   ORDERING CLINICIAN: SHARONA MCKEON   FINDINGS: Single AP view of the pelvis is provided for interpretation.   No displaced fracture or traumatic malalignment is identified. Degenerative changes are present at the hips bilaterally, pubic symphysis, sacroiliac joints, and partially visualized lumbar spine.   Sclerotic focus in the right acetabulum is unchanged in appearance to prior CT in December of 2022. Soft tissues are unremarkable in appearance.       No evidence of acute trauma to the  pelvis.     MACRO: None   Signed by: Kamini Vicente 10/3/2023 4:43 PM Dictation workstation:   [id number]    XR chest 1 view    Result Date: 10/3/2023  Interpreted By:  Kamini Vicente, STUDY: XR CHEST 1 VIEW;  10/3/2023 4:38 pm   INDICATION: Signs/Symptoms:syncope.   COMPARISON: Radiographs of the chest dated 01/31/2023.   ACCESSION NUMBER(S): [id number]   ORDERING CLINICIAN: SHARONA MCKEON   FINDINGS: AP radiograph of the chest was provided.       CARDIOMEDIASTINAL SILHOUETTE: Cardiomediastinal silhouette is normal in size and configuration.   LUNGS: Somewhat low lung volumes with bronchovascular crowding, without focal consolidation, pleural effusion or pneumothorax.   ABDOMEN: No remarkable upper abdominal findings.   BONES: No acute osseous changes.       1.  Somewhat low lung volumes without focal consolidation, pleural effusion or pneumothorax.       MACRO: None   Signed by: Kamini Vicente 10/3/2023 4:41 PM Dictation workstation:   [id number]    CT cervical spine wo IV contrast    Result Date: 10/3/2023  Interpreted By:  Majo Rudolph, STUDY: CT CERVICAL SPINE WO IV CONTRAST;  10/3/2023 4:19 pm   INDICATION: Signs/Symptoms:fall pain.   COMPARISON: 12/21/2022   ACCESSION NUMBER(S): [id number]   ORDERING CLINICIAN: SHARONA MCKEON   TECHNIQUE: CT images were obtained through the cervical spine. Sagittal and coronal reconstructions were generated.   FINDINGS:     ALIGNMENT: Similar to the prior exam including straightening of the lower cervical lordosis, mild anterolisthesis at C4-5 and retrolisthesis at C7-T1.   VERTEBRAE/DISC SPACES: No acute cervical vertebral compression deformity or acute displaced fracture. Moderate anterior wedging of C7 similar to the prior exam. Severe atlantoaxial joint space narrowing with adjacent calcifications and spurring similar to the prior exam. Smooth calcifications/ossifications in the spinal canal at C2-C4 similar to the prior exam. Multilevel  degenerative endplate spurring and intervertebral disc calcifications. Multilevel bilateral facet joint narrowing and spurring. These findings are similar to the previous exam.   ADDITIONAL FINDINGS: No abnormal thickening of the prevertebral soft tissues. Patchy presumed scarring and coarse linear opacities in the included bilateral lung apices.       No acute cervical vertebral displacement or acute displaced fracture. Mild spondylolisthesis, moderate compression deformity of C7 and multilevel productive/degenerative changes similar to 12/21/2022.   MACRO: None.   Signed by: Majo Rudolph 10/3/2023 4:35 PM Dictation workstation:   [id number]    CT head W O contrast trauma protocol    Result Date: 10/3/2023  Interpreted By:  Majo Rudolph, STUDY: CT HEAD W/O CONTRAST TRAUMA PROTOCOL;  10/3/2023 4:19 pm   INDICATION: Signs/Symptoms:fall on ground.   COMPARISON: 12/21/2022   ACCESSION NUMBER(S): [id number]   ORDERING CLINICIAN: SHARONA MCKEON   TECHNIQUE: Unenhanced CT images of the head were obtained.   FINDINGS: The ventricles, cisterns and sulci are enlarged, consistent with diffuse volume loss. There are confluence areas of nonspecific white matter hypodensity, which are probably age related or microvascular in nature. These findings are similar to the previous exam. There is no acute intracranial hemorrhage, mass effect or midline shift. No extraaxial fluid collection.   No acute displaced calvarial fracture.   Subtotal opacification of the partially visualized left maxillary sinus, similar to the prior exam. Remaining visualized paranasal sinuses are clear. Dense presumed surgical material along the anterior margin of each globe.       No acute intracranial hemorrhage or mass-effect.   Left maxillary sinus disease.   MACRO: None.     Signed by: Majo Rudolph 10/3/2023 4:27 PM Dictation workstation:   [id number]        Assessment and Plan    Ms. Neha Carranza is an 83 year old female with PMHx of HTN  and recurrent falls who presents to [facility] after suffering an unwitnessed fall at home with a down time of up to six hours. Currently being managed with IV fluids. Being worked up for  cause of fall. Cardio on board.     Acute Medical issues:     # Rhabdomyolysis 2/2 Fall  # Deconditioning  # Syncope vs Mechanical fall  - Patient has history of multiple falls per month due to “dizziness” per patient's daughter  - Sees Neurology, Dr. Jeffrey Roe, outpatient. Sees cardiology, Dr. Hans Izquierdo outpatient  - Currently AxO 3 with daughter at bedside. States she does get difficulty finding words but otherwise is aware of place and time. Patient likely agitated with strangers  - CK 4,666 in ED. Repeat 3433 --> 2955  --> 2299   - Currently on LR 75 ml/ Hr  - Orthostatic vitals: BP is 135/73 heart rate 79, standing 136/95 heart rate 94  - Cardiology on board  - Had echo today. Likely require stress test as well  - PT/OT on board.   - Encourage oral intake      Addressed medical issues:    # Non-cardiac Troponin elevation:  - EKG reviewed, Rate 75, IL Interval 202ms,  ms, QT /QTc 440/491 ms, NSR, LAD, Left BBB  - Tele reviewed. Irregular heart rate  - Echo from 3/12/19: Showed preserved EF 55-60% with Mild concentric LVH, mild to moderate mitral regurgitation, and mil tricuspid regurgitation   - Troponin 911 --> 723 --> 633   - Cardio on board  - Monitor for now. Minimum c/f NSTEMI      Chronic medical issues:     # HTN  # CAD  - Continue home Aspirin 81 mg QD  - Continue home losartan 25 mg every day     # Bladder Cancer  - UA positive for protein and blood  - Multiple surgeries (Jan, March, Aug 2023)  - No chemo, and not on any current medications     # Dementia:   # Agitation  - Was previously been on rivastigmine 1.5 mg for mild dementia. Stopped recently due to dizziness as potential side effect  - Placed on scheduled Aripiprazole 10 mg at night time  - Olanzapine 2.5 mg every day prn for  agitation         Fluids:  LR IV infusion 75 ml/hr  Electrolytes: Replete prn   Nutrition: Regular diet  GI prophylaxis: NA  VTE prophylaxis: Lovenox 40 mg QD  Supp O2: Stable on RA  Antibiotics: None  Code: Full Code       Disposition: Admitted due to unwitnessed fall and elevated CK and troponins. PT/OT consulted. Cardiology on board. Patient pending cardiac stress currently. Monitor for now.    Michelle Monaco MD

## 2023-10-06 ENCOUNTER — APPOINTMENT (OUTPATIENT)
Dept: RADIOLOGY | Facility: HOSPITAL | Age: 83
DRG: 565 | End: 2023-10-06
Payer: MEDICARE

## 2023-10-06 ENCOUNTER — APPOINTMENT (OUTPATIENT)
Dept: CARDIOLOGY | Facility: HOSPITAL | Age: 83
DRG: 565 | End: 2023-10-06
Payer: MEDICARE

## 2023-10-06 PROBLEM — M62.82 RHABDOMYOLYSIS: Status: ACTIVE | Noted: 2023-10-06

## 2023-10-06 LAB
ALBUMIN SERPL BCP-MCNC: 3.8 G/DL (ref 3.4–5)
ANION GAP SERPL CALC-SCNC: 15 MMOL/L (ref 10–20)
BUN SERPL-MCNC: 12 MG/DL (ref 6–23)
CALCIUM SERPL-MCNC: 8.6 MG/DL (ref 8.6–10.3)
CHLORIDE SERPL-SCNC: 105 MMOL/L (ref 98–107)
CK SERPL-CCNC: 1574 U/L (ref 0–215)
CO2 SERPL-SCNC: 23 MMOL/L (ref 21–32)
CREAT SERPL-MCNC: 0.87 MG/DL (ref 0.5–1.05)
EJECTION FRACTION APICAL 4 CHAMBER: 43.4
ERYTHROCYTE [DISTWIDTH] IN BLOOD BY AUTOMATED COUNT: 12.3 % (ref 11.5–14.5)
GFR SERPL CREATININE-BSD FRML MDRD: 66 ML/MIN/1.73M*2
GLUCOSE SERPL-MCNC: 90 MG/DL (ref 74–99)
HCT VFR BLD AUTO: 42.1 % (ref 36–46)
HGB BLD-MCNC: 13.8 G/DL (ref 12–16)
MAGNESIUM SERPL-MCNC: 1.91 MG/DL (ref 1.6–2.4)
MCH RBC QN AUTO: 32.4 PG (ref 26–34)
MCHC RBC AUTO-ENTMCNC: 32.8 G/DL (ref 32–36)
MCV RBC AUTO: 99 FL (ref 80–100)
NRBC BLD-RTO: 0 /100 WBCS (ref 0–0)
PHOSPHATE SERPL-MCNC: 2.9 MG/DL (ref 2.5–4.9)
PLATELET # BLD AUTO: 195 X10*3/UL (ref 150–450)
PMV BLD AUTO: 10.4 FL (ref 7.5–11.5)
POTASSIUM SERPL-SCNC: 3.7 MMOL/L (ref 3.5–5.3)
RBC # BLD AUTO: 4.26 X10*6/UL (ref 4–5.2)
SODIUM SERPL-SCNC: 139 MMOL/L (ref 136–145)
WBC # BLD AUTO: 7.6 X10*3/UL (ref 4.4–11.3)

## 2023-10-06 PROCEDURE — 2500000001 HC RX 250 WO HCPCS SELF ADMINISTERED DRUGS (ALT 637 FOR MEDICARE OP)

## 2023-10-06 PROCEDURE — 36415 COLL VENOUS BLD VENIPUNCTURE: CPT

## 2023-10-06 PROCEDURE — 97110 THERAPEUTIC EXERCISES: CPT | Mod: GP

## 2023-10-06 PROCEDURE — 2580000001 HC RX 258 IV SOLUTIONS

## 2023-10-06 PROCEDURE — 99232 SBSQ HOSP IP/OBS MODERATE 35: CPT

## 2023-10-06 PROCEDURE — 80069 RENAL FUNCTION PANEL: CPT

## 2023-10-06 PROCEDURE — 97530 THERAPEUTIC ACTIVITIES: CPT | Mod: GO

## 2023-10-06 PROCEDURE — 2060000001 HC INTERMEDIATE ICU ROOM DAILY

## 2023-10-06 PROCEDURE — 82550 ASSAY OF CK (CPK): CPT

## 2023-10-06 PROCEDURE — 85027 COMPLETE CBC AUTOMATED: CPT

## 2023-10-06 PROCEDURE — 83735 ASSAY OF MAGNESIUM: CPT

## 2023-10-06 RX ORDER — SODIUM,POTASSIUM PHOSPHATES 280-250MG
1 POWDER IN PACKET (EA) ORAL ONCE
Status: COMPLETED | OUTPATIENT
Start: 2023-10-06 | End: 2023-10-06

## 2023-10-06 RX ADMIN — SODIUM CHLORIDE, POTASSIUM CHLORIDE, SODIUM LACTATE AND CALCIUM CHLORIDE 1000 ML: 600; 310; 30; 20 INJECTION, SOLUTION INTRAVENOUS at 12:23

## 2023-10-06 RX ADMIN — ASPIRIN 81 MG CHEWABLE TABLET 81 MG: 81 TABLET CHEWABLE at 09:11

## 2023-10-06 RX ADMIN — LOSARTAN POTASSIUM 25 MG: 50 TABLET, FILM COATED ORAL at 20:22

## 2023-10-06 RX ADMIN — POTASSIUM & SODIUM PHOSPHATES POWDER PACK 280-160-250 MG 1 PACKET: 280-160-250 PACK at 09:11

## 2023-10-06 ASSESSMENT — COGNITIVE AND FUNCTIONAL STATUS - GENERAL
MOVING TO AND FROM BED TO CHAIR: A LOT
STANDING UP FROM CHAIR USING ARMS: A LOT
CLIMB 3 TO 5 STEPS WITH RAILING: A LOT
EATING MEALS: A LITTLE
PERSONAL GROOMING: A LITTLE
WALKING IN HOSPITAL ROOM: A LOT
WALKING IN HOSPITAL ROOM: A LOT
MOBILITY SCORE: 16
DRESSING REGULAR LOWER BODY CLOTHING: A LOT
STANDING UP FROM CHAIR USING ARMS: A LITTLE
CLIMB 3 TO 5 STEPS WITH RAILING: A LOT
DAILY ACTIVITIY SCORE: 15
TOILETING: A LITTLE
MOBILITY SCORE: 17
MOVING TO AND FROM BED TO CHAIR: A LOT
HELP NEEDED FOR BATHING: A LOT
DRESSING REGULAR UPPER BODY CLOTHING: A LOT

## 2023-10-06 ASSESSMENT — PAIN SCALES - GENERAL
PAINLEVEL_OUTOF10: 0 - NO PAIN
PAINLEVEL_OUTOF10: 0 - NO PAIN

## 2023-10-06 ASSESSMENT — PAIN - FUNCTIONAL ASSESSMENT
PAIN_FUNCTIONAL_ASSESSMENT: 0-10
PAIN_FUNCTIONAL_ASSESSMENT: 0-10

## 2023-10-06 ASSESSMENT — PAIN SCALES - WONG BAKER: WONGBAKER_NUMERICALRESPONSE: NO HURT

## 2023-10-06 NOTE — PROGRESS NOTES
Subjective Data:  Doing well today. Denies chest pain or pressure. Denies palpitations. Not on tele-- patient removed tele continuously      Objective Data:  Last Recorded Vitals:  Vitals:    10/05/23 2035 10/06/23 0448 10/06/23 0800 10/06/23 1400   BP: 131/76 177/86 166/87 146/81   BP Location: Left arm  Left arm Left arm   Patient Position: Sitting  Sitting Sitting   Pulse: 80 69 65 76   Resp:  18  18   Temp: 35.9 °C (96.6 °F) 36.3 °C (97.3 °F)  36.6 °C (97.9 °F)   TempSrc: Temporal Temporal  Temporal   SpO2: 97% 94%  94%   Weight:       Height:           Last Labs:  CBC - 10/6/2023:  7:04 AM  7.6 13.8 195    42.1      CMP - 10/6/2023:  7:03 AM  8.6 6.3 97 --- 0.5   2.9 3.8 39 58      PTT - No results in last year.  _   _ _     TROPHS   Date/Time Value Ref Range Status   10/03/2023 10:12  0 - 13 ng/L Final     Comment:     Previous result verified on 10/3/2023 1638 on specimen/case [id number] called with component TRPHS for procedure Troponin I, High Sensitivity with value 911 ng/L.   10/03/2023 05:22  0 - 13 ng/L Final     Comment:     Previous result verified on 10/3/2023 1638 on specimen/case [id number] called with component TRPHS for procedure Troponin I, High Sensitivity with value 911 ng/L.   10/03/2023 03:41  0 - 13 ng/L Final     HGBA1C   Date/Time Value Ref Range Status   04/08/2022 10:36 AM 5.2 4.3 - 5.6 % Final     Comment:     American Diabetes Association guidelines indicate that patients with HgbA1c in the range 5.7-6.4% are at increased risk for development of diabetes, and intervention by lifestyle modification may be beneficial. HgbA1c greater or equal to 6.5% is considered diagnostic of diabetes.   07/27/2018 10:45 AM 5.2 4.0 - 6.0 % Final     Comment:     Hemoglobin A1C levels are related to mean blood glucose during the   preceding 2-3 months. The relationship table below may be used as a   general guide. Each 1% increase in HGB A1C is a reflection of an   increase  in mean glucose of approximately 30 mg/dl.   Reference: Diabetes Care, volume 29, supplement 1 Jan. 2006                        HGB A1C ................. Approx. Mean Glucose   _______________________________________________   6%   ...............................  120 mg/dl   7%   ...............................  150 mg/dl   8%   ...............................  180 mg/dl   9%   ...............................  210 mg/dl   10%  ...............................  240 mg/dl  Performed at [address] [address]        Last I/O:  I/O last 3 completed shifts:  In: 1000 (18.2 mL/kg) [I.V.:1000 (18.2 mL/kg)]  Out: - (0 mL/kg)   Weight: 55 kg       Echo:  Transthoracic Echo (TTE) Complete 10/6/2023  Pending report       Inpatient Medications:  Scheduled medications   Medication Dose Route Frequency    ARIPiprazole  10 mg oral Nightly    aspirin  81 mg oral Daily    enoxaparin  40 mg subcutaneous q24h    losartan  25 mg oral Nightly     PRN medications   Medication    acetaminophen    Or    acetaminophen    Or    acetaminophen    OLANZapine     Continuous Medications   Medication Dose Last Rate       Physical Exam:  Constitutional: Pleasant, Awake/Alert/Oriented to person place and time. No distress  Head: Atraumatic, Normocephalic  Eyes: EOMI. CANDY  Neck: Enlarged neck circumference, No JVD  Cardiovascular: Regular rate and rhythm, S1, S2. No extra heart sounds or murmurs  Respiratory: Clear to auscultation bilaterally. No wheezing, rales or rhonchi. Good chest wall expansion  Abdomen: Soft, Nontender, Obese. Bowel sounds appreciated  Musculoskeletal: ROM intact. Muscle strength grossly intact upper and lower extremities 5/5.   Neurological: CNII-XII intact. Sensation grossly intact  Extremities: Warm and dry. No acute rashes and lesions  Psychiatric: Appropriate mood and affect       Assessment/Plan   Very pleasant 84 yo htn, recurrent falls with possible syncope admitted s/p unwitnessed fall with  reported syncope.      Assessment:  Syncope/fall  Rhabdomyolysis  Elevated troponin, suspect demand ischemia  LBBB (old)     Plan:  Echocardiogram pending full report, however, discussed with Dr. Wade-- EF is preserved.  Recommended pharmacological nuclear stress test to differentiate stress induced ischemia, however, the patient declined.  She prefers to follow up with her primary cardiologist, Dr. Grimes to discuss necessity.  Also recommend further cardiac ambulatory monitoring to differentiate arrhythmia-- patient to follow up with cardiologist to discuss this as well.  Continue current losartan and aspirin therapy.  We will sign off. Please call with questions.     Peripheral IV 10/05/23 20 G Right Antecubital (Active)   Site Assessment Clean;Dry;Intact 10/06/23 1044   Dressing Status Clean;Dry 10/06/23 1044   Number of days: 1       Code Status:  Full Code    I spent 35 minutes in the professional and overall care of this patient.        ALIS Robertson-CNP

## 2023-10-06 NOTE — PROGRESS NOTES
10/06/23 1103   Discharge Planning   Living Arrangements Alone   Support Systems Family members   Assistance Needed assistance with ADLs; patient has a caregiver Monday - Saturday and her daughter lives 15 minutes away and checks on the patient often   Type of Residence Private residence   Home or Post Acute Services Post acute facilities (Rehab/SNF/etc)   Type of Post Acute Facility Services Skilled nursing   Patient expects to be discharged to: SNF 1. [facility]   Does the patient need discharge transport arranged? Yes   RoundTrip coordination needed? Yes   Has discharge transport been arranged? No

## 2023-10-06 NOTE — PROGRESS NOTES
Physical Therapy    Physical Therapy Treatment    Patient Name: Neha Carranza  MRN: [id number]  Today's Date: 10/6/2023  Time Calculation  Start Time: 1336  Stop Time: 1411  Time Calculation (min): 35 min       Assessment/Plan   PT Assessment  PT Assessment Results: Decreased strength, Decreased endurance, Impaired balance, Decreased mobility, Decreased safety awareness  Rehab Prognosis: Good  Evaluation/Treatment Tolerance: Patient tolerated treatment well  Medical Staff Made Aware: Yes  End of Session Communication: Bedside nurse  Assessment Comment: pt demonstrates decreased functional strength, balance, and gait tolerance. Recommend consistent supervision and assist at this time to maintain pt safety. High falls risk as assessed with h/o 3+ falls x6 months. Rec mod despite high AMPAC.  End of Session Patient Position: Up in chair     PT Plan  Treatment/Interventions: Bed mobility, Transfer training, Gait training, Balance training, Therapeutic exercise  PT Plan: Skilled PT  PT Frequency: 3 times per week  PT Discharge Recommendations: Moderate intensity level of continued care  PT Recommended Transfer Status: Assist x1      General Visit Information:   PT  Visit  PT Received On: 10/06/23  General  Reason for Referral: Impaired mobilty  Past Medical History Relevant to Rehab: Multiple falls  Family/Caregiver Present: Yes  Caregiver Feedback: Daughter (Patient lives alone but has caregivers stop by daily. Per patient's daughter patient becomes dizzy and has had multiple falls in the past few months.)  Prior to Session Communication: Bedside nurse  Patient Position Received: Up in chair, Alarm off, not on at start of session  Preferred Learning Style: auditory, kinesthetic, Other:(comment)  General Comment:  (Patient very pleasant. Patient presents with foward flexed cervical spine. IV in tact.)    Subjective   Precautions:  Precautions  Medical Precautions: Fall precautions  Vital Signs:       Objective    Pain:     Cognition:  Cognition  Overall Cognitive Status: Within Functional Limits  Orientation Level: Disoriented to situation, Disoriented to person, Disoriented to place  Attention: Within Functional Limits  Memory: Within Funtional Limits  Postural Control:  Postural Control  Postural Control: Impaired  Head Control: Forward flexed head (Educated patient/ family on ther ex to strengthen forward head posture. Patient verbally knows and demonstrated with reminders.)  Trunk Control: Forward trunk in standing  Extremity/Trunk Assessments:     Activity Tolerance:  Activity Tolerance  Endurance: Tolerates 30 min exercise with multiple rests  Activity Tolerance Comments:  (Worked on endurance and balance)  Treatments:  Therapeutic Exercise  Therapeutic Exercise Performed: Yes  Therapeutic Exercise Activity 1: Standing Ther x 10 each B LE. Using FWW for B LE support (Hip fles, alt marches, HR/TR, HS curls, Abd)  Therapeutic Exercise Activity 2: Seated ther ex x 10 -15 each B LE (AP, Ankle circles (CW/CCW), LAQ, Abd/add (Lightly resisted), HS (lightly resisted),Hip flex)    Therapeutic Activity  Therapeutic Activity Performed: Yes  Therapeutic Activity 1: Multiple Sit<>Stands w/ standing rest breaks for posture correction.         Ambulation/Gait Training  Ambulation/Gait Training Performed: Yes  Ambulation/Gait Training 1  Surface 1: Level tile  Device 1: Rolling walker  Assistance 1: Minimum assistance  Comments/Distance (ft) 1: Gait deviations (Decreased kalpana, decreased step length, decreased stride length.Forward flexed posture more with neck then trunk. VC to correct.)  Transfers  Transfer: Yes  Transfer 1  Technique 1: Sit to stand  Transfer Device 1: Walker  Transfer Level of Assistance 1: Minimum assistance  Trials/Comments 1: Sit<>Stand x 4 trials (Static standing b/t trials for balance, endurance,and posture control)    Outcome Measures:  [facility] Basic Mobility  Turning from your back to your side while in  a flat bed without using bedrails: None  Moving from lying on your back to sitting on the side of a flat bed without using bedrails: None  Moving to and from bed to chair (including a wheelchair): A lot  Standing up from a chair using your arms (e.g. wheelchair or bedside chair): A little  To walk in hospital room: A lot  Climbing 3-5 steps with railing: A lot  Basic Mobility - Total Score: 17    Education Documentation  Body Mechanics, taught by Susy Carlisle PTA at 10/6/2023  3:59 PM.  Learner: Family, Patient  Readiness: Acceptance  Method: Explanation, Demonstration, Teach-back  Response: Verbalizes Understanding, Demonstrated Understanding    Home Exercise Program, taught by Susy Carlisle PTA at 10/6/2023  3:59 PM.  Learner: Family, Patient  Readiness: Acceptance  Method: Explanation, Demonstration, Teach-back  Response: Verbalizes Understanding, Demonstrated Understanding    Precautions, taught by Susy Carlisle PTA at 10/6/2023  3:59 PM.  Learner: Family, Patient  Readiness: Acceptance  Method: Explanation, Demonstration, Teach-back  Response: Verbalizes Understanding, Demonstrated Understanding    Mobility Training, taught by Susy Carlisle PTA at 10/6/2023  3:59 PM.  Learner: Family, Patient  Readiness: Acceptance  Method: Explanation, Demonstration, Teach-back  Response: Verbalizes Understanding, Demonstrated Understanding    Education Comments  No comments found.        OP EDUCATION:  Education  Education Provided: Body Mechanics, Fall Risk, Home Exercise Program, Home Safety  Risk and Benefits Discussed with Patient/Caregiver/Other: yes  Patient/Caregiver Demonstrated Understanding: yes  Plan of Care Discussed and Agreed Upon: yes  Patient Response to Education: Patient/Caregiver Verbalized Understanding of Information    Encounter Problems       Encounter Problems (Active)       Balance       balance (Progressing)       Start:  10/04/23    Expected End:  10/18/23       Pt will demo  static/dynamic standing balance x5 minutes with B to U UE support and S/I to improve stability and decrease falls              Balance       Pt will demo good static/dynamic standing balance during ADL and functional activity with FWW for improved independence and participation in ADL  (Progressing)       Start:  10/05/23    Expected End:  10/19/23               Mobility       Gait (Progressing)       Start:  10/04/23    Expected End:  10/18/23       3x100 feet with/without use of DME necessary to initiate return to PLOF           strength (Progressing)       Start:  10/04/23    Expected End:  10/18/23       X15+ reps ther ex to increase general strength and improve functional independence               Mobility       Pt will demo increased functional mobility to tolerate tasks necessary to complete ADL routine with FWW at mod I  (Progressing)       Start:  10/05/23    Expected End:  10/19/23               Transfers       transfer (Progressing)       Start:  10/04/23    Expected End:  10/18/23       Pt will complete all functional transfers with S/I necessary to initiate return to PLOF               Transfers       Pt will complete transfers with FWW at mod I  (Progressing)       Start:  10/05/23    Expected End:  10/19/23

## 2023-10-06 NOTE — PROGRESS NOTES
Subjective   Interval History: Yesterday evening, pt self-removed IV and refused to let staff replace.     At 0448, patient with /86, P69, RR 18. Patient without symptoms of headache, vision changes, CP, SOB at the time.    On encounter this AM, patient says that she feels well but would like to go home. Continues to endorse dizziness/lightheadedness upon sitting up or standing. Feels that her risk of falling if she were to walk around is high. Endorses good appetite and urine output yesterday. Denies fevers, chills, CP, SOB, N/V/D.    Objective   Vital signs in last 24 hours:  Temp:  [35.9 °C (96.6 °F)-36.7 °C (98 °F)] 36.3 °C (97.3 °F)  Heart Rate:  [65-80] 65  Resp:  [18-20] 18  BP: (131-177)/(76-87) 166/87    Intake/Output last 3 shifts:  I/O last 3 completed shifts:  In: 1000 (18.2 mL/kg) [I.V.:1000 (18.2 mL/kg)]  Out: - (0 mL/kg)   Weight: 55 kg   Intake/Output this shift:  No intake/output data recorded.    Physical Exam  Constitutional:       Appearance: Normal appearance.   HENT:      Head: Normocephalic and atraumatic.      Mouth/Throat:      Mouth: Mucous membranes are moist.   Cardiovascular:      Rate and Rhythm: Normal rate and regular rhythm.      Heart sounds: No murmur heard.     No friction rub. No gallop.   Pulmonary:      Breath sounds: Normal breath sounds. No stridor. No wheezing or rhonchi.   Abdominal:      General: Abdomen is flat. Bowel sounds are normal.      Palpations: Abdomen is soft. There is no mass.      Tenderness: There is no abdominal tenderness. There is no guarding.   Musculoskeletal:         General: No swelling or tenderness. Normal range of motion.      Cervical back: Normal range of motion.      Right lower leg: No edema.      Left lower leg: No edema.   Skin:     General: Skin is warm and dry.   Neurological:      Mental Status: She is alert.      Comments: AO 2/4 (to person and time)   Psychiatric:         Mood and Affect: Mood normal.         Scheduled  medications  ARIPiprazole, 10 mg, oral, Nightly  aspirin, 81 mg, oral, Daily  enoxaparin, 40 mg, subcutaneous, q24h  losartan, 25 mg, oral, Nightly      Continuous medications  lactated Ringer's, 75 mL/hr, Last Rate: 75 mL/hr (10/05/23 1608)      PRN medications  PRN medications: acetaminophen **OR** acetaminophen **OR** acetaminophen, OLANZapine     Results  Lab Results   Component Value Date    WBC 7.6 10/06/2023    HGB 13.8 10/06/2023    HCT 42.1 10/06/2023    MCV 99 10/06/2023     10/06/2023      Lab Results   Component Value Date    GLUCOSE 90 10/06/2023    CALCIUM 8.6 10/06/2023     10/06/2023    K 3.7 10/06/2023    CO2 23 10/06/2023     10/06/2023    BUN 12 10/06/2023    CREATININE 0.87 10/06/2023    MG 1.91 10/06/2023      Transthoracic Echo (TTE) Complete    Result Date: 10/6/2023   [facility], [address] [place] [address]               Tel [phone] and Fax [phone] TRANSTHORACIC ECHOCARDIOGRAM REPORT  Patient Name:      JAYLEEN REINOSO  Reading Physician:    [id number] Vic Wade MD Study Date:        10/5/2023            Ordering Provider:    [id number] SAHHRAM ESPANA MRN/PID:           [id number]             Fellow: Accession#:        [id number]         Nurse: Date of Birth/Age: 1940 / 83 years Sonographer:          Fawn Ryder RDCS Gender:            F                    Additional Staff: Height:            152.40 cm            Admit Date:           10/3/2023 Weight:            54.88 kg             Admission Status:     Inpatient -                                                               Routine BSA:               1.51 m2              Encounter#:           [id number]                                         Department Location:  [facility] Non                                                               Invasive Blood  Pressure: 158 /80 mmHg Study Type:    TRANSTHORACIC ECHO (TTE) COMPLETE Diagnosis/ICD: Syncope-R55 Indication:    Syncope Patient History: Pertinent History: HTN. Study Detail: The following Echo studies were performed: 2D, M-Mode, Doppler and               color flow. The patient was awake.  PHYSICIAN INTERPRETATION: Left Ventricle: The left ventricular systolic function is normal, with an estimated ejection fraction of 45-50%. There are no regional wall motion abnormalities. The left ventricular cavity size is normal. Left ventricular diastolic filling was indeterminate. Left Atrium: The left atrium is normal in size. Right Ventricle: The right ventricle is normal in size. There is normal right ventricular global systolic function. Right Atrium: The right atrium is normal in size. Aortic Valve: The aortic valve is trileaflet. There is no evidence of aortic valve regurgitation. The peak instantaneous gradient of the aortic valve is 19.6 mmHg. The mean gradient of the aortic valve is 10.8 mmHg. Mitral Valve: The mitral valve is normal in structure. There is trace mitral valve regurgitation. Tricuspid Valve: The tricuspid valve is structurally normal. There is trace tricuspid regurgitation. Pulmonic Valve: The pulmonic valve is not well visualized. The pulmonic valve regurgitation was not well visualized. Pericardium: There is no pericardial effusion noted. Aorta: The aortic root is abnormal. The aortic root is at the upper limits of normal size. Systemic Veins: The inferior vena cava appears to be of normal size. There is IVC inspiratory collapse greater than 50%. In comparison to the previous echocardiogram(s): There are no prior studies on this patient for comparison purposes.  CONCLUSIONS:  1. Left ventricular systolic function is normal with a 45-50% estimated ejection fraction. QUANTITATIVE DATA SUMMARY: 2D MEASUREMENTS:                          Normal Ranges: IVSd:          1.10 cm   (0.6-1.1cm) LVPWd:          1.07 cm   (0.6-1.1cm) LVIDd:         3.65 cm   (3.9-5.9cm) LVIDs:         2.87 cm LV Mass Index: 82.1 g/m2 LV % FS        21.5 % LA VOLUME:                               Normal Ranges: LA Vol A4C:        33.2 ml    (22+/-6mL/m2) LA Vol A2C:        34.7 ml LA Vol BP:         34.7 ml LA Vol Index A4C:  22.0ml/m2 LA Vol Index A2C:  23.0 ml/m2 LA Vol Index BP:   23.0 ml/m2 LA Area A4C:       13.4 cm2 LA Area A2C:       14.0 cm2 LA Major Axis A4C: 4.6 cm LA Major Axis A2C: 4.8 cm LA Volume Index:   22.8 ml/m2 LA Vol A4C:        31.2 ml LA Vol A2C:        33.5 ml RA VOLUME BY A/L METHOD:                       Normal Ranges: RA Area A4C: 10.0 cm2 M-MODE MEASUREMENTS:                  Normal Ranges: Ao Root: 2.90 cm (2.0-3.7cm) LAs:     3.13 cm (2.7-4.0cm) AORTA MEASUREMENTS:                      Normal Ranges: Ao Sinus, d: 2.90 cm (2.1-3.5cm) Asc Ao, d:   3.60 cm (2.1-3.4cm) LV SYSTOLIC FUNCTION BY 2D PLANIMETRY (MOD):                     Normal Ranges: EF-A4C View: 43.4 % (>=55%) EF-A2C View: 47.4 % EF-Biplane:  45.8 % LV DIASTOLIC FUNCTION:                               Normal Ranges: MV Peak E:        0.66 m/s    (0.7-1.2 m/s) MV Peak A:        1.04 m/s    (0.42-0.7 m/s) E/A Ratio:        0.64        (1.0-2.2) MV e'             0.05 m/s    (>8.0) MV lateral e'     0.05 m/s MV medial e'      0.03 m/s MV A Dur:         156.86 msec E/e' Ratio:       13.21       (<8.0) PulmV Sys Babak:    65.42 cm/s PulmV Valenzuela Babak:   33.71 cm/s PulmV S/D Babak:    1.94 PulmV A Revs Babak: 29.38 cm/s PulmV A Revs Dur: 128.03 msec MITRAL VALVE:                 Normal Ranges: MV DT: 209 msec (150-240msec) AORTIC VALVE:                                    Normal Ranges: AoV Vmax:                2.21 m/s  (<=1.7m/s) AoV Peak P.6 mmHg (<20mmHg) AoV Mean PG:             10.8 mmHg (1.7-11.5mmHg) LVOT Max Babak:            1.58 m/s  (<=1.1m/s) AoV VTI:                 45.15 cm  (18-25cm) LVOT VTI:                31.30 cm LVOT Diameter:            1.97 cm   (1.8-2.4cm) AoV Area, VTI:           2.12 cm2  (2.5-5.5cm2) AoV Area,Vmax:           2.18 cm2  (2.5-4.5cm2) AoV Dimensionless Index: 0.69  RIGHT VENTRICLE: RV Basal 3.50 cm RV Mid   2.20 cm RV Major 7.1 cm TAPSE:   27.0 mm RV s'    0.09 m/s TRICUSPID VALVE/RVSP:                             Normal Ranges: Peak TR Velocity: 2.69 m/s RV Syst Pressure: 31.9 mmHg (< 30mmHg) IVC Diam:         1.62 cm PULMONIC VALVE:                      Normal Ranges: PV Max Babak: 1.0 m/s  (0.6-0.9m/s) PV Max P.0 mmHg Pulmonary Veins: PulmV A Revs Dur: 128.03 msec PulmV A Revs Babak: 29.38 cm/s PulmV Valenzuela Babak:   33.71 cm/s PulmV S/D Babak:    1.94 PulmV Sys Babak:    65.42 cm/s AORTA: Asc Ao Diam 3.55 cm  [id number] Vic Wade MD Electronically signed on 10/6/2023 at 10:57:20 AM  ** Final **       Assessment/Plan     Ms. Neha Carranza is an 83 year old female with PMHx of HTN and recurrent falls who presents to [facility] after suffering an unwitnessed fall at home with a down time of up to six hours. Being worked up for  cause of fall. Cardio on board.     Acute Medical issues:    # Syncope vs Mechanical fall  # Deconditioning  # Rhabdomyolysis 2/2 Fall, improving  Patient has history of multiple falls per month due to “dizziness” per patient's daughter. Less likely cardiac etiology given echo results. Less likely orthostatic hypotension given orthostats negative.  - Sees Neurology, Dr. Jeffrey Roe, outpatient. Sees cardiology, Dr. Hans Izquierdo outpatient  - CK 4,666 in ED. Repeat 3433 --> 2955  --> 2299 -->1574  - Patient refusing access for IVF - will try to bolus   - Orthostatic vitals: BP is 135/73 heart rate 79, standing 136/95 heart rate 94  - Cardiology on board  - Carotid duplex US negative for significant stenosis of carotid arteries  - TTE (10/5) - EF 45-50%  - PT/OT eval - pending SNF  - Plan for stress test and daniella MURRELL w/ Dr. Izquierdo     Addressed medical issues:     # Non-cardiac Troponin  elevation:  - EKG reviewed, Rate 75, NJ Interval 202ms,  ms, QT /QTc 440/491 ms, NSR, LAD, Left BBB  - Tele reviewed. Irregular heart rate  - Echo from 3/12/19: Showed preserved EF 55-60% with Mild concentric LVH, mild to moderate mitral regurgitation, and mil tricuspid regurgitation   - Troponin 911 --> 723 --> 633   - Cardio on board  - Monitor for now. Minimum c/f NSTEMI  - Monitor on tele     Chronic medical issues:     # HTN  # CAD  - Continue home Aspirin 81 mg QD  - Continue home losartan 25 mg every day     # Bladder Cancer  - UA positive for protein and blood  - Multiple surgeries (Jan, March, Aug 2023)  - No chemo, and not on any current medications     # Dementia  # Agitation  - Was previously been on rivastigmine 1.5 mg for mild dementia. Stopped recently due to dizziness as potential side effect  - Cont scheduled Aripiprazole 10 mg at night time  - Olanzapine 2.5 mg every day prn for agitation        Fluids:  1L LR bolus  Electrolytes: Replete prn   Nutrition:  cardiac 2g Na diet  GI prophylaxis: NA  VTE prophylaxis: Lovenox 40 mg QD  Supp O2: Stable on RA  Antibiotics: None  Code: Full Code         Disposition: Admitted due to unwitnessed fall and elevated CK and troponins. PT/OT consulted. Cardiology on board. Plan for dispo to SNF.         Principal Problem:    Fall, initial encounter  Active Problems:    Impaired cognition    Syncope    Rhabdomyolysis      Annika Baldwin MD

## 2023-10-07 LAB
ALBUMIN SERPL BCP-MCNC: 3.4 G/DL (ref 3.4–5)
ANION GAP SERPL CALC-SCNC: 11 MMOL/L (ref 10–20)
BUN SERPL-MCNC: 13 MG/DL (ref 6–23)
CALCIUM SERPL-MCNC: 8.3 MG/DL (ref 8.6–10.3)
CHLORIDE SERPL-SCNC: 104 MMOL/L (ref 98–107)
CK MB CFR SERPL CALC: 0 %MB OF CK
CK MB SERPL-CCNC: 3.5 NG/ML
CK SERPL-CCNC: 762 U/L (ref 0–215)
CO2 SERPL-SCNC: 25 MMOL/L (ref 21–32)
CREAT SERPL-MCNC: 0.9 MG/DL (ref 0.5–1.05)
ERYTHROCYTE [DISTWIDTH] IN BLOOD BY AUTOMATED COUNT: 12.3 % (ref 11.5–14.5)
GFR SERPL CREATININE-BSD FRML MDRD: 63 ML/MIN/1.73M*2
GLUCOSE SERPL-MCNC: 96 MG/DL (ref 74–99)
HCT VFR BLD AUTO: 38.3 % (ref 36–46)
HGB BLD-MCNC: 12.8 G/DL (ref 12–16)
MAGNESIUM SERPL-MCNC: 1.8 MG/DL (ref 1.6–2.4)
MCH RBC QN AUTO: 32.2 PG (ref 26–34)
MCHC RBC AUTO-ENTMCNC: 33.4 G/DL (ref 32–36)
MCV RBC AUTO: 96 FL (ref 80–100)
NRBC BLD-RTO: 0 /100 WBCS (ref 0–0)
PHOSPHATE SERPL-MCNC: 3.5 MG/DL (ref 2.5–4.9)
PLATELET # BLD AUTO: 183 X10*3/UL (ref 150–450)
PMV BLD AUTO: 9.9 FL (ref 7.5–11.5)
POTASSIUM SERPL-SCNC: 3.7 MMOL/L (ref 3.5–5.3)
RBC # BLD AUTO: 3.98 X10*6/UL (ref 4–5.2)
SODIUM SERPL-SCNC: 136 MMOL/L (ref 136–145)
WBC # BLD AUTO: 8.4 X10*3/UL (ref 4.4–11.3)

## 2023-10-07 PROCEDURE — 2500000001 HC RX 250 WO HCPCS SELF ADMINISTERED DRUGS (ALT 637 FOR MEDICARE OP)

## 2023-10-07 PROCEDURE — 82553 CREATINE MB FRACTION: CPT | Mod: CMCLAB,GEALAB

## 2023-10-07 PROCEDURE — 83735 ASSAY OF MAGNESIUM: CPT

## 2023-10-07 PROCEDURE — 82550 ASSAY OF CK (CPK): CPT

## 2023-10-07 PROCEDURE — 36415 COLL VENOUS BLD VENIPUNCTURE: CPT

## 2023-10-07 PROCEDURE — 99232 SBSQ HOSP IP/OBS MODERATE 35: CPT

## 2023-10-07 PROCEDURE — 80069 RENAL FUNCTION PANEL: CPT

## 2023-10-07 PROCEDURE — 85027 COMPLETE CBC AUTOMATED: CPT

## 2023-10-07 PROCEDURE — 2060000001 HC INTERMEDIATE ICU ROOM DAILY

## 2023-10-07 RX ADMIN — LOSARTAN POTASSIUM 25 MG: 50 TABLET, FILM COATED ORAL at 20:00

## 2023-10-07 RX ADMIN — ASPIRIN 81 MG CHEWABLE TABLET 81 MG: 81 TABLET CHEWABLE at 09:39

## 2023-10-07 SDOH — SOCIAL STABILITY: SOCIAL INSECURITY: ABUSE: ADULT

## 2023-10-07 SDOH — SOCIAL STABILITY: SOCIAL INSECURITY: ARE THERE ANY APPARENT SIGNS OF INJURIES/BEHAVIORS THAT COULD BE RELATED TO ABUSE/NEGLECT?: NO

## 2023-10-07 SDOH — SOCIAL STABILITY: SOCIAL INSECURITY: WERE YOU ABLE TO COMPLETE ALL THE BEHAVIORAL HEALTH SCREENINGS?: YES

## 2023-10-07 SDOH — ECONOMIC STABILITY: INCOME INSECURITY: IN THE PAST 12 MONTHS, HAS THE ELECTRIC, GAS, OIL, OR WATER COMPANY THREATENED TO SHUT OFF SERVICE IN YOUR HOME?: NO

## 2023-10-07 SDOH — SOCIAL STABILITY: SOCIAL INSECURITY: HAS ANYONE EVER THREATENED TO HURT YOUR FAMILY OR YOUR PETS?: NO

## 2023-10-07 SDOH — SOCIAL STABILITY: SOCIAL INSECURITY: DO YOU FEEL UNSAFE GOING BACK TO THE PLACE WHERE YOU ARE LIVING?: NO

## 2023-10-07 SDOH — SOCIAL STABILITY: SOCIAL INSECURITY: HAVE YOU HAD THOUGHTS OF HARMING ANYONE ELSE?: NO

## 2023-10-07 SDOH — HEALTH STABILITY: MENTAL HEALTH: HOW OFTEN DO YOU HAVE A DRINK CONTAINING ALCOHOL?: NEVER

## 2023-10-07 SDOH — SOCIAL STABILITY: SOCIAL INSECURITY: ARE YOU OR HAVE YOU BEEN THREATENED OR ABUSED PHYSICALLY, EMOTIONALLY, OR SEXUALLY BY ANYONE?: NO

## 2023-10-07 SDOH — SOCIAL STABILITY: SOCIAL INSECURITY: DO YOU FEEL ANYONE HAS EXPLOITED OR TAKEN ADVANTAGE OF YOU FINANCIALLY OR OF YOUR PERSONAL PROPERTY?: NO

## 2023-10-07 SDOH — SOCIAL STABILITY: SOCIAL INSECURITY: DOES ANYONE TRY TO KEEP YOU FROM HAVING/CONTACTING OTHER FRIENDS OR DOING THINGS OUTSIDE YOUR HOME?: NO

## 2023-10-07 SDOH — HEALTH STABILITY: MENTAL HEALTH: HOW OFTEN DO YOU HAVE 6 OR MORE DRINKS ON ONE OCCASION?: NEVER

## 2023-10-07 SDOH — HEALTH STABILITY: MENTAL HEALTH: HOW MANY STANDARD DRINKS CONTAINING ALCOHOL DO YOU HAVE ON A TYPICAL DAY?: PATIENT DOES NOT DRINK

## 2023-10-07 ASSESSMENT — PATIENT HEALTH QUESTIONNAIRE - PHQ9
1. LITTLE INTEREST OR PLEASURE IN DOING THINGS: NOT AT ALL
SUM OF ALL RESPONSES TO PHQ9 QUESTIONS 1 & 2: 0
1. LITTLE INTEREST OR PLEASURE IN DOING THINGS: NOT AT ALL
2. FEELING DOWN, DEPRESSED OR HOPELESS: NOT AT ALL
1. LITTLE INTEREST OR PLEASURE IN DOING THINGS: NOT AT ALL
SUM OF ALL RESPONSES TO PHQ9 QUESTIONS 1 & 2: 0
SUM OF ALL RESPONSES TO PHQ9 QUESTIONS 1 & 2: 0

## 2023-10-07 ASSESSMENT — ACTIVITIES OF DAILY LIVING (ADL)
ADEQUATE_TO_COMPLETE_ADL: YES
PATIENT'S MEMORY ADEQUATE TO SAFELY COMPLETE DAILY ACTIVITIES?: NO
FEEDING YOURSELF: NEEDS ASSISTANCE
WALKS IN HOME: DEPENDENT
TOILETING: DEPENDENT
HEARING - LEFT EAR: DIFFICULTY WITH NOISE
ASSISTIVE_DEVICE: WALKER
BATHING: DEPENDENT
JUDGMENT_ADEQUATE_SAFELY_COMPLETE_DAILY_ACTIVITIES: NO
HEARING - RIGHT EAR: DIFFICULTY WITH NOISE
GROOMING: NEEDS ASSISTANCE
DRESSING YOURSELF: NEEDS ASSISTANCE

## 2023-10-07 ASSESSMENT — LIFESTYLE VARIABLES
HOW OFTEN DO YOU HAVE 6 OR MORE DRINKS ON ONE OCCASION: NEVER
AUDIT-C TOTAL SCORE: 0
HOW OFTEN DO YOU HAVE A DRINK CONTAINING ALCOHOL: NEVER
HOW OFTEN DO YOU HAVE 6 OR MORE DRINKS ON ONE OCCASION: NEVER
SUBSTANCE_ABUSE_PAST_12_MONTHS: NO
HOW OFTEN DO YOU HAVE 6 OR MORE DRINKS ON ONE OCCASION: NEVER
PRESCIPTION_ABUSE_PAST_12_MONTHS: NO
SKIP TO QUESTIONS 9-10: 1
HOW MANY STANDARD DRINKS CONTAINING ALCOHOL DO YOU HAVE ON A TYPICAL DAY: PATIENT DOES NOT DRINK
HOW MANY STANDARD DRINKS CONTAINING ALCOHOL DO YOU HAVE ON A TYPICAL DAY: PATIENT DOES NOT DRINK
SKIP TO QUESTIONS 9-10: 1
AUDIT-C TOTAL SCORE: 0
HOW MANY STANDARD DRINKS CONTAINING ALCOHOL DO YOU HAVE ON A TYPICAL DAY: PATIENT DOES NOT DRINK
AUDIT-C TOTAL SCORE: 0
SUBSTANCE_ABUSE_PAST_12_MONTHS: NO
SKIP TO QUESTIONS 9-10: 1
HOW OFTEN DO YOU HAVE A DRINK CONTAINING ALCOHOL: NEVER
PRESCIPTION_ABUSE_PAST_12_MONTHS: NO
SKIP TO QUESTIONS 9-10: 1
HOW OFTEN DO YOU HAVE A DRINK CONTAINING ALCOHOL: NEVER
AUDIT-C TOTAL SCORE: 0
AUDIT-C TOTAL SCORE: 0

## 2023-10-07 ASSESSMENT — ENCOUNTER SYMPTOMS
ABDOMINAL PAIN: 0
RESPIRATORY NEGATIVE: 1
DIARRHEA: 0
CHOKING: 0
CONSTITUTIONAL NEGATIVE: 1
SHORTNESS OF BREATH: 0
GASTROINTESTINAL NEGATIVE: 1
NEUROLOGICAL NEGATIVE: 1
PALPITATIONS: 0
COUGH: 0
CHEST TIGHTNESS: 0
CARDIOVASCULAR NEGATIVE: 1
CONSTIPATION: 0

## 2023-10-07 ASSESSMENT — PAIN - FUNCTIONAL ASSESSMENT
PAIN_FUNCTIONAL_ASSESSMENT: 0-10
PAIN_FUNCTIONAL_ASSESSMENT: 0-10

## 2023-10-07 ASSESSMENT — COGNITIVE AND FUNCTIONAL STATUS - GENERAL
STANDING UP FROM CHAIR USING ARMS: A LITTLE
HELP NEEDED FOR BATHING: A LOT
DRESSING REGULAR UPPER BODY CLOTHING: A LOT
MOBILITY SCORE: 14
TOILETING: A LITTLE
EATING MEALS: A LITTLE
MOVING TO AND FROM BED TO CHAIR: A LOT
TOILETING: A LOT
WALKING IN HOSPITAL ROOM: A LOT
EATING MEALS: A LITTLE
CLIMB 3 TO 5 STEPS WITH RAILING: TOTAL
STANDING UP FROM CHAIR USING ARMS: A LITTLE
WALKING IN HOSPITAL ROOM: A LOT
DRESSING REGULAR LOWER BODY CLOTHING: A LOT
TURNING FROM BACK TO SIDE WHILE IN FLAT BAD: A LITTLE
DRESSING REGULAR LOWER BODY CLOTHING: A LOT
MOBILITY SCORE: 17
MOVING TO AND FROM BED TO CHAIR: A LOT
MOVING TO AND FROM BED TO CHAIR: A LOT
EATING MEALS: A LITTLE
MOBILITY SCORE: 14
HELP NEEDED FOR BATHING: A LOT
WALKING IN HOSPITAL ROOM: A LOT
HELP NEEDED FOR BATHING: A LOT
PERSONAL GROOMING: A LITTLE
CLIMB 3 TO 5 STEPS WITH RAILING: TOTAL
DAILY ACTIVITIY SCORE: 14
TURNING FROM BACK TO SIDE WHILE IN FLAT BAD: A LITTLE
DRESSING REGULAR LOWER BODY CLOTHING: A LOT
PERSONAL GROOMING: A LOT
PATIENT BASELINE BEDBOUND: NO
PERSONAL GROOMING: A LOT
DAILY ACTIVITIY SCORE: 14
MOVING FROM LYING ON BACK TO SITTING ON SIDE OF FLAT BED WITH BEDRAILS: A LITTLE
MOVING FROM LYING ON BACK TO SITTING ON SIDE OF FLAT BED WITH BEDRAILS: A LITTLE
DAILY ACTIVITIY SCORE: 15
DRESSING REGULAR UPPER BODY CLOTHING: A LITTLE
TOILETING: A LOT
DRESSING REGULAR UPPER BODY CLOTHING: A LITTLE
CLIMB 3 TO 5 STEPS WITH RAILING: A LOT
STANDING UP FROM CHAIR USING ARMS: A LITTLE

## 2023-10-07 ASSESSMENT — PAIN SCALES - GENERAL
PAINLEVEL_OUTOF10: 0 - NO PAIN
PAINLEVEL_OUTOF10: 0 - NO PAIN

## 2023-10-07 NOTE — CARE PLAN
The patient's goals for the shift include  Prevent fall and skin injury    The clinical goals for the shift include prevent fall and skin injury    Over the shift, the patient did  make progress toward the following goals. Barriers to progression include anxiety and dementia. Recommendations to address these barriers include include family in goal setting.

## 2023-10-07 NOTE — PROGRESS NOTES
Internal Medicine-Daily Progress Note         Chief Complaint     Chief Complaint   Patient presents with   • Fall   • Weakness, Gen     Had COVID vaccine yesterday, found on the ground at [facility] where she lives. Pt is A&OX2-3 at baseline but recently has been showing signs of dementia. Pt denies any c/o, poor historian. Pt's daughter at bedside.        Subjective    - Patient was seen and examined at bedside  - Endorses feeling well  - Was AxO 3, pleasant, conversing well    ROS  Review of Systems   Constitutional: Negative.    Respiratory: Negative.  Negative for cough, choking, chest tightness and shortness of breath.    Cardiovascular: Negative.  Negative for chest pain, palpitations and leg swelling.   Gastrointestinal: Negative.  Negative for abdominal pain, constipation and diarrhea.   Neurological: Negative.             Meds    Scheduled medications  ARIPiprazole, 10 mg, oral, Nightly  aspirin, 81 mg, oral, Daily  enoxaparin, 40 mg, subcutaneous, q24h  losartan, 25 mg, oral, Nightly      Continuous medications     PRN medications  PRN medications: acetaminophen **OR** acetaminophen **OR** acetaminophen, OLANZapine     Objective    Physical Exam  Constitutional:       Appearance: Normal appearance.   HENT:      Head: Normocephalic and atraumatic.      Mouth/Throat:      Mouth: Mucous membranes are moist.   Cardiovascular:      Rate and Rhythm: Normal rate and regular rhythm.      Heart sounds: No murmur heard.     No friction rub. No gallop.   Pulmonary:      Breath sounds: Normal breath sounds. No stridor. No wheezing or rhonchi.   Abdominal:      General: Abdomen is flat. Bowel sounds are normal.      Palpations: Abdomen is soft. There is no mass.      Tenderness: There is no abdominal tenderness. There is no guarding.   Musculoskeletal:         General: No swelling or tenderness. Normal range of motion.      Cervical back: Normal range of motion.   Neurological:      General: No focal  deficit present.      Mental Status: She is alert and oriented to person, place, and time.   Psychiatric:         Mood and Affect: Mood normal.         Behavior: Behavior normal.        Visit Vitals  /68 (BP Location: Left arm, Patient Position: Sitting)   Pulse 79   Temp 36.2 °C (97.2 °F) (Temporal)   Resp 19   Ht 1.524 m (5')   Wt 55 kg (121 lb 4.1 oz)   SpO2 97%   BMI 23.68 kg/m²   Smoking Status Never   BSA 1.53 m²        Intake/Output Summary (Last 24 hours) at 10/7/2023 1457  Last data filed at 10/6/2023 1859  Gross per 24 hour   Intake 1000 ml   Output --   Net 1000 ml       Vent settings:         Labs:   Results from last 72 hours   Lab Units 10/07/23  0602 10/06/23  0703 10/05/23  0856   SODIUM mmol/L 136 139 137   POTASSIUM mmol/L 3.7 3.7 3.6   CHLORIDE mmol/L 104 105 105   CO2 mmol/L 25 23 25   BUN mg/dL 13 12 12   CREATININE mg/dL 0.90 0.87 0.93   GLUCOSE mg/dL 96 90 90   CALCIUM mg/dL 8.3* 8.6 8.3*   ANION GAP mmol/L 11 15 11   EGFR mL/min/1.73m*2 63 66 61   PHOSPHORUS mg/dL 3.5 2.9 2.4*      Results from last 72 hours   Lab Units 10/07/23  0602 10/06/23  0704 10/05/23  0856   WBC AUTO x10*3/uL 8.4 7.6 7.5   HEMOGLOBIN g/dL 12.8 13.8 12.9   HEMATOCRIT % 38.3 42.1 38.8   PLATELETS AUTO x10*3/uL 183 195 180      Lab Results   Component Value Date    CALCIUM 8.3 (L) 10/07/2023    PHOS 3.5 10/07/2023        Images    Transthoracic Echo (TTE) Complete    Result Date: 10/6/2023   [facility], [address] [place] [address]               Tel [phone] and Fax [phone] TRANSTHORACIC ECHOCARDIOGRAM REPORT  Patient Name:      JAYLEEN CONDE KEMAR  Reading Physician:    [id number] Vic Wade MD Study Date:        10/5/2023            Ordering Provider:    [id number] SHAHRAM ESPANA MRN/PID:           [id number]             Fellow: Accession#:        [id number]          Nurse: Date of Birth/Age: 1940 / 83 years Sonographer:          Fawn Ryder RDCS Gender:            F                    Additional Staff: Height:            152.40 cm            Admit Date:           10/3/2023 Weight:            54.88 kg             Admission Status:     Inpatient -                                                               Routine BSA:               1.51 m2              Encounter#:           [id number]                                         Department Location:  [facility]                                                               Invasive Blood Pressure: 158 /80 mmHg Study Type:    TRANSTHORACIC ECHO (TTE) COMPLETE Diagnosis/ICD: Syncope-R55 Indication:    Syncope Patient History: Pertinent History: HTN. Study Detail: The following Echo studies were performed: 2D, M-Mode, Doppler and               color flow. The patient was awake.  PHYSICIAN INTERPRETATION: Left Ventricle: The left ventricular systolic function is normal, with an estimated ejection fraction of 45-50%. There are no regional wall motion abnormalities. The left ventricular cavity size is normal. Left ventricular diastolic filling was indeterminate. Left Atrium: The left atrium is normal in size. Right Ventricle: The right ventricle is normal in size. There is normal right ventricular global systolic function. Right Atrium: The right atrium is normal in size. Aortic Valve: The aortic valve is trileaflet. There is no evidence of aortic valve regurgitation. The peak instantaneous gradient of the aortic valve is 19.6 mmHg. The mean gradient of the aortic valve is 10.8 mmHg. Mitral Valve: The mitral valve is normal in structure. There is trace mitral valve regurgitation. Tricuspid Valve: The tricuspid valve is structurally normal. There is trace tricuspid regurgitation. Pulmonic Valve: The pulmonic valve is not well visualized. The pulmonic valve regurgitation was not well visualized. Pericardium: There is no  pericardial effusion noted. Aorta: The aortic root is abnormal. The aortic root is at the upper limits of normal size. Systemic Veins: The inferior vena cava appears to be of normal size. There is IVC inspiratory collapse greater than 50%. In comparison to the previous echocardiogram(s): There are no prior studies on this patient for comparison purposes.  CONCLUSIONS:  1. Left ventricular systolic function is normal with a 45-50% estimated ejection fraction. QUANTITATIVE DATA SUMMARY: 2D MEASUREMENTS:                          Normal Ranges: IVSd:          1.10 cm   (0.6-1.1cm) LVPWd:         1.07 cm   (0.6-1.1cm) LVIDd:         3.65 cm   (3.9-5.9cm) LVIDs:         2.87 cm LV Mass Index: 82.1 g/m2 LV % FS        21.5 % LA VOLUME:                               Normal Ranges: LA Vol A4C:        33.2 ml    (22+/-6mL/m2) LA Vol A2C:        34.7 ml LA Vol BP:         34.7 ml LA Vol Index A4C:  22.0ml/m2 LA Vol Index A2C:  23.0 ml/m2 LA Vol Index BP:   23.0 ml/m2 LA Area A4C:       13.4 cm2 LA Area A2C:       14.0 cm2 LA Major Axis A4C: 4.6 cm LA Major Axis A2C: 4.8 cm LA Volume Index:   22.8 ml/m2 LA Vol A4C:        31.2 ml LA Vol A2C:        33.5 ml RA VOLUME BY A/L METHOD:                       Normal Ranges: RA Area A4C: 10.0 cm2 M-MODE MEASUREMENTS:                  Normal Ranges: Ao Root: 2.90 cm (2.0-3.7cm) LAs:     3.13 cm (2.7-4.0cm) AORTA MEASUREMENTS:                      Normal Ranges: Ao Sinus, d: 2.90 cm (2.1-3.5cm) Asc Ao, d:   3.60 cm (2.1-3.4cm) LV SYSTOLIC FUNCTION BY 2D PLANIMETRY (MOD):                     Normal Ranges: EF-A4C View: 43.4 % (>=55%) EF-A2C View: 47.4 % EF-Biplane:  45.8 % LV DIASTOLIC FUNCTION:                               Normal Ranges: MV Peak E:        0.66 m/s    (0.7-1.2 m/s) MV Peak A:        1.04 m/s    (0.42-0.7 m/s) E/A Ratio:        0.64        (1.0-2.2) MV e'             0.05 m/s    (>8.0) MV lateral e'     0.05 m/s MV medial e'      0.03 m/s MV A Dur:         156.86 msec  E/e' Ratio:       13.21       (<8.0) PulmV Sys Babak:    65.42 cm/s PulmV Valenzuela Babak:   33.71 cm/s PulmV S/D Babak:    1.94 PulmV A Revs Babak: 29.38 cm/s PulmV A Revs Dur: 128.03 msec MITRAL VALVE:                 Normal Ranges: MV DT: 209 msec (150-240msec) AORTIC VALVE:                                    Normal Ranges: AoV Vmax:                2.21 m/s  (<=1.7m/s) AoV Peak P.6 mmHg (<20mmHg) AoV Mean PG:             10.8 mmHg (1.7-11.5mmHg) LVOT Max Babak:            1.58 m/s  (<=1.1m/s) AoV VTI:                 45.15 cm  (18-25cm) LVOT VTI:                31.30 cm LVOT Diameter:           1.97 cm   (1.8-2.4cm) AoV Area, VTI:           2.12 cm2  (2.5-5.5cm2) AoV Area,Vmax:           2.18 cm2  (2.5-4.5cm2) AoV Dimensionless Index: 0.69  RIGHT VENTRICLE: RV Basal 3.50 cm RV Mid   2.20 cm RV Major 7.1 cm TAPSE:   27.0 mm RV s'    0.09 m/s TRICUSPID VALVE/RVSP:                             Normal Ranges: Peak TR Velocity: 2.69 m/s RV Syst Pressure: 31.9 mmHg (< 30mmHg) IVC Diam:         1.62 cm PULMONIC VALVE:                      Normal Ranges: PV Max Babak: 1.0 m/s  (0.6-0.9m/s) PV Max P.0 mmHg Pulmonary Veins: PulmV A Revs Dur: 128.03 msec PulmV A Revs Babak: 29.38 cm/s PulmV Valenzuela Babak:   33.71 cm/s PulmV S/D Babak:    1.94 PulmV Sys Babak:    65.42 cm/s AORTA: Asc Ao Diam 3.55 cm  [id number] Vic Wade MD Electronically signed on 10/6/2023 at 10:57:20 AM  ** Final **     Vascular US carotid artery duplex bilateral    Result Date: 10/6/2023          [place] [id number]  Tel [phone] and Fax [phone]  Vascular Lab Report Carotid Artery Duplex Ultrasound  Patient Name:      JAYLEEN REINOSO  Reading Physician:  [id number] Latonia aDly MD, RPVI Study Date:        10/5/2023            Ordering Provider:  [id number] SHAHRAM ESPANA MRN/PID:           [id number]             Technologist:       Kary Perez RVT  Accession#:        [id number]         Technologist 2: Date of Birth/Age: 1940 / 83 years Encounter#:         [id number] Gender:            F Admission Status:  Outpatient           Location Performed: [facility]  Diagnosis/ICD: Syncope and collapse-R55  CONCLUSIONS: Right Carotid: Findings are consistent with less than 50% stenosis of the right proximal internal carotid artery. Laminar flow seen by color Doppler. Tortuous right ICA noted. Right external carotid artery appears patent with no evidence of stenosis. The right vertebral artery is patent with antegrade flow. No evidence of hemodynamically significant stenosis in the right subclavian artery. Left Carotid: Findings are consistent with less than 50% stenosis of the left proximal internal carotid artery. Laminar flow seen by color Doppler. Left external carotid artery appears patent with no evidence of stenosis. The left vertebral artery is patent with antegrade flow. No evidence of hemodynamically significant stenosis in the left subclavian artery.  Comparison: Compared with study from 6/29/2017, no significant change.  Imaging & Doppler Findings: Right Plaque Morph: The proximal right internal carotid artery demonstrates heterogenous and calcified plaque. The proximal right external carotid artery demonstrates heterogenous plaque. The distal right common carotid artery demonstrates heterogenous and calcified plaque. Left Plaque Morph: The proximal left internal carotid artery demonstrates heterogenous and calcified plaque. The proximal left external carotid artery demonstrates heterogenous plaque. The distal left common carotid artery demonstrates heterogenous plaque.   Right                        Left   PSV      EDV                PSV     EDV 83 cm/s            CCA P    74 cm/s 54 cm/s            CCA D    68 cm/s 47 cm/s  10 cm/s   ICA P    52 cm/s  9 cm/s 100 cm/s           ICA M    83 cm/s 84 cm/s            ICA D    59 cm/s 97 cm/s              ECA     62 cm/s 46 cm/s          Vertebral  54 cm/s 137 cm/s         Subclavian 132 cm/s               Right Left ICA/CCA Ratio  0.9  0.8   [id number] AMINAH Allen MD Electronically signed by [id number] AMINAH Allen MD on 10/6/2023 at 9:25:28 AM  ** Final **     CT angio chest for pulmonary embolism    Result Date: 10/3/2023  Interpreted By:  Philomena Pack, STUDY: CT ANGIO CHEST FOR PULMONARY EMBOLISM;  10/3/2023 8:40 pm   INDICATION: Signs/Symptoms:syncope, fall elevated troponin.   COMPARISON: 01/10/2022   ACCESSION NUMBER(S): [id number]   ORDERING CLINICIAN: MARIA T PENA   TECHNIQUE: Contiguous axial images of the chest were obtained after the intravenous administration of 54 mL Omnipaque 350 using angiographic PE protocol. Coronal and sagittal reformatted images were reconstructed from the axial data. MIP images were created and reviewed.   FINDINGS: MEDIASTINUM AND LYMPH NODES: No enlarged intrathoracic or axillary lymph nodes.  No pneumomediastinum.   VESSELS:  Normal caliber aorta with mild aortic atherosclerosis.  No pulmonary embolism identified to the segmental level.   HEART: Cardiomegaly. Coronary artery calcifications noted however exam is not optimized for evaluation. Small pericardial effusion. Mild wall thickening/the esophagus suspected. The   LUNG, AIRWAYS, AND PLEURA: Central airways are patent. Bibasilar interstitial opacities and scarring. Punctate calcifications, likely sequela of remote granulomatous disease. Noncalcified pulmonary nodules including a 2 mm nodule in the left lower lobe and 6 mm noncalcified nodule in the right lower lobe no focal consolidation, pleural effusion or sizable pneumothorax seen.   OSSEOUS STRUCTURES/CHEST WALL: Degenerative changes and levoscoliosis of the thoracolumbar spine. No acute osseous abnormality.   UPPER ABDOMEN/OTHER: Punctate splenic calcifications, likely sequela of remote granulomatous disease. Partially imaged peripherally  calcified cystic lesion possibly arising from the left kidney, visualized on CT abdomen 12/21/2022       No acute pulmonary embolism to the segmental level.   Noncalcified pulmonary nodules measuring up to 6 mm in the right lower lobe, similar to 01/10/2022.   Instructions:  Consider follow up non contrast chest CT at 3-6 months, then consider CT chest at 18-24 months. (Marlon Nails et al., Guidelines for management of incidental pulmonary nodules detected on CT images: From the Fleischner Society 2017, Radiology. 2017 Jul;284 (1):228-243.) [email or web address].ACR.[email or web address]   Mild hyperinflated lung fields, cardiomegaly and possible fibrosis.   Partially imaged cystic partially calcified left renal lesion better delineated on dedicated abdominal imaging. Continued follow-up recommended. Renal MRI with contrast may be considered as clinically indicated.   MACRO: Incidental Finding:  Multiple solid non-calcified pulmonary nodules measuring up to 6-8 mm.  (**-YCF-**)   Critical Finding:  There are multiple incidental findings. See findings. notification was initiated on 10/3/2023 at 9:11 pm by Philomena Pack.  (**-YCF-**) Instructions:   Signed by: Philomena Pack 10/3/2023 9:11 PM Dictation workstation:   [id number]    XR pelvis 1-2 views    Result Date: 10/3/2023  Interpreted By:  Kamini Vicente, STUDY: XR PELVIS 1-2 VIEWS; ;  10/3/2023 4:37 pm   INDICATION: Signs/Symptoms:fall could not get up.   COMPARISON: CT abdomen and pelvis dated 12/21/2022.   ACCESSION NUMBER(S): [id number]   ORDERING CLINICIAN: SHARONA MCKEON   FINDINGS: Single AP view of the pelvis is provided for interpretation.   No displaced fracture or traumatic malalignment is identified. Degenerative changes are present at the hips bilaterally, pubic symphysis, sacroiliac joints, and partially visualized lumbar spine.   Sclerotic focus in the right acetabulum is unchanged in appearance to prior CT in December of 2022. Soft tissues are unremarkable in  appearance.       No evidence of acute trauma to the pelvis.     MACRO: None   Signed by: Kamini Vicente 10/3/2023 4:43 PM Dictation workstation:   [id number]    XR chest 1 view    Result Date: 10/3/2023  Interpreted By:  Kamini Vicente, STUDY: XR CHEST 1 VIEW;  10/3/2023 4:38 pm   INDICATION: Signs/Symptoms:syncope.   COMPARISON: Radiographs of the chest dated 01/31/2023.   ACCESSION NUMBER(S): [id number]   ORDERING CLINICIAN: SHARONA MCKEON   FINDINGS: AP radiograph of the chest was provided.       CARDIOMEDIASTINAL SILHOUETTE: Cardiomediastinal silhouette is normal in size and configuration.   LUNGS: Somewhat low lung volumes with bronchovascular crowding, without focal consolidation, pleural effusion or pneumothorax.   ABDOMEN: No remarkable upper abdominal findings.   BONES: No acute osseous changes.       1.  Somewhat low lung volumes without focal consolidation, pleural effusion or pneumothorax.       MACRO: None   Signed by: Kamini Vicente 10/3/2023 4:41 PM Dictation workstation:   [id number]    CT cervical spine wo IV contrast    Result Date: 10/3/2023  Interpreted By:  Majo Rudolph, STUDY: CT CERVICAL SPINE WO IV CONTRAST;  10/3/2023 4:19 pm   INDICATION: Signs/Symptoms:fall pain.   COMPARISON: 12/21/2022   ACCESSION NUMBER(S): [id number]   ORDERING CLINICIAN: SHARONA MCKEON   TECHNIQUE: CT images were obtained through the cervical spine. Sagittal and coronal reconstructions were generated.   FINDINGS:     ALIGNMENT: Similar to the prior exam including straightening of the lower cervical lordosis, mild anterolisthesis at C4-5 and retrolisthesis at C7-T1.   VERTEBRAE/DISC SPACES: No acute cervical vertebral compression deformity or acute displaced fracture. Moderate anterior wedging of C7 similar to the prior exam. Severe atlantoaxial joint space narrowing with adjacent calcifications and spurring similar to the prior exam. Smooth calcifications/ossifications in the spinal  canal at C2-C4 similar to the prior exam. Multilevel degenerative endplate spurring and intervertebral disc calcifications. Multilevel bilateral facet joint narrowing and spurring. These findings are similar to the previous exam.   ADDITIONAL FINDINGS: No abnormal thickening of the prevertebral soft tissues. Patchy presumed scarring and coarse linear opacities in the included bilateral lung apices.       No acute cervical vertebral displacement or acute displaced fracture. Mild spondylolisthesis, moderate compression deformity of C7 and multilevel productive/degenerative changes similar to 12/21/2022.   MACRO: None.   Signed by: Majo Rudolph 10/3/2023 4:35 PM Dictation workstation:   [id number]    CT head W O contrast trauma protocol    Result Date: 10/3/2023  Interpreted By:  Majo Rudolph, STUDY: CT HEAD W/O CONTRAST TRAUMA PROTOCOL;  10/3/2023 4:19 pm   INDICATION: Signs/Symptoms:fall on ground.   COMPARISON: 12/21/2022   ACCESSION NUMBER(S): [id number]   ORDERING CLINICIAN: SHARONA MCKEON   TECHNIQUE: Unenhanced CT images of the head were obtained.   FINDINGS: The ventricles, cisterns and sulci are enlarged, consistent with diffuse volume loss. There are confluence areas of nonspecific white matter hypodensity, which are probably age related or microvascular in nature. These findings are similar to the previous exam. There is no acute intracranial hemorrhage, mass effect or midline shift. No extraaxial fluid collection.   No acute displaced calvarial fracture.   Subtotal opacification of the partially visualized left maxillary sinus, similar to the prior exam. Remaining visualized paranasal sinuses are clear. Dense presumed surgical material along the anterior margin of each globe.       No acute intracranial hemorrhage or mass-effect.   Left maxillary sinus disease.   MACRO: None.     Signed by: Majo Rudolph 10/3/2023 4:27 PM Dictation workstation:   [id number]        Assessment and Plan    Ms. Solomon  Tere is an 83 year old female with PMHx of HTN and recurrent falls who presents to [facility] after suffering an unwitnessed fall at home with a down time of up to six hours. Being worked up for  cause of fall. Cardio on board.     Updates:  - Awaiting pre-cert for patient  - Will stop trending daily labs    Acute Medical issues:     # Syncope vs Mechanical fall  # Deconditioning  # Rhabdomyolysis 2/2 Fall, improving  - Patient has history of multiple falls per month due to “dizziness” per patient's daughter. Less likely cardiac etiology given echo results. Less likely orthostatic hypotension given orthostats negative.  - Sees Neurology, Dr. Jeffrey Roe, outpatient. Sees cardiology, Dr. Hans Izquierdo outpatient  - CK 4,666 in ED. Repeat 3433 --> 2955  --> 2299 -->1574  - Orthostatic vitals: BP is 135/73 heart rate 79, standing 136/95 heart rate 94  - Cardiology on board  - Carotid duplex US negative for significant stenosis of carotid arteries  - TTE (10/5) - EF 45-50%  - PT/OT eval - Pending SNF  - Plan for Stress test and [facility]io OP w/ Dr. Izquierdo     Addressed medical issues:     # Non-cardiac Troponin elevation:  - EKG reviewed, Rate 75, IL Interval 202ms,  ms, QT /QTc 440/491 ms, NSR, LAD, Left BBB  - Tele reviewed. Irregular heart rate  - Echo from 3/12/19: Showed preserved EF 55-60% with Mild concentric LVH, mild to moderate mitral regurgitation, and mil tricuspid regurgitation   - Troponin 911 --> 723 --> 633   - Cardio on board  - Monitor for now. Minimum c/f NSTEMI     Chronic medical issues:     # HTN  # CAD  - Continue home Aspirin 81 mg QD  - Continue home losartan 25 mg every day     # Bladder Cancer  - UA positive for protein and blood  - Multiple surgeries (Jan, March, Aug 2023)  - No chemo, and not on any current medications     # Dementia  # Agitation  - Was previously been on rivastigmine 1.5 mg for mild dementia. Stopped recently due to dizziness as potential side effect  -  Cont scheduled Aripiprazole 10 mg at night time  - Olanzapine 2.5 mg every day prn for agitation      Fluids:  None, PO intake  Electrolytes: Replete prn   Nutrition: Cardiac 2 g diet  GI prophylaxis: NA  VTE prophylaxis: Lovenox 40 mg QD  Supp O2: Stable on RA  Antibiotics: None   Code: Full Code       Disposition: Admitted due to unwitnessed fall and elevated CK and troponins. Currently patient awaiting placement. Pre-cert initiated.      10/07/23 at 2:57 PM - Michelle Monaco MD

## 2023-10-08 LAB
ANION GAP SERPL CALC-SCNC: 13 MMOL/L (ref 10–20)
BUN SERPL-MCNC: 19 MG/DL (ref 6–23)
CALCIUM SERPL-MCNC: 8.4 MG/DL (ref 8.6–10.3)
CHLORIDE SERPL-SCNC: 105 MMOL/L (ref 98–107)
CK MB CFR SERPL CALC: 1 %MB OF CK
CK MB SERPL-CCNC: 4.1 NG/ML
CK SERPL-CCNC: 442 U/L (ref 0–215)
CO2 SERPL-SCNC: 25 MMOL/L (ref 21–32)
CREAT SERPL-MCNC: 1.09 MG/DL (ref 0.5–1.05)
ERYTHROCYTE [DISTWIDTH] IN BLOOD BY AUTOMATED COUNT: 12.4 % (ref 11.5–14.5)
GFR SERPL CREATININE-BSD FRML MDRD: 50 ML/MIN/1.73M*2
GLUCOSE SERPL-MCNC: 82 MG/DL (ref 74–99)
HCT VFR BLD AUTO: 39.5 % (ref 36–46)
HGB BLD-MCNC: 12.9 G/DL (ref 12–16)
MCH RBC QN AUTO: 32.5 PG (ref 26–34)
MCHC RBC AUTO-ENTMCNC: 32.7 G/DL (ref 32–36)
MCV RBC AUTO: 100 FL (ref 80–100)
NRBC BLD-RTO: 0 /100 WBCS (ref 0–0)
PLATELET # BLD AUTO: 183 X10*3/UL (ref 150–450)
PMV BLD AUTO: 10.8 FL (ref 7.5–11.5)
POTASSIUM SERPL-SCNC: 3.6 MMOL/L (ref 3.5–5.3)
RBC # BLD AUTO: 3.97 X10*6/UL (ref 4–5.2)
SODIUM SERPL-SCNC: 139 MMOL/L (ref 136–145)
WBC # BLD AUTO: 7.1 X10*3/UL (ref 4.4–11.3)

## 2023-10-08 PROCEDURE — 85027 COMPLETE CBC AUTOMATED: CPT

## 2023-10-08 PROCEDURE — 82553 CREATINE MB FRACTION: CPT | Mod: CMCLAB,GEALAB

## 2023-10-08 PROCEDURE — 99232 SBSQ HOSP IP/OBS MODERATE 35: CPT

## 2023-10-08 PROCEDURE — 2500000004 HC RX 250 GENERAL PHARMACY W/ HCPCS (ALT 636 FOR OP/ED)

## 2023-10-08 PROCEDURE — 36415 COLL VENOUS BLD VENIPUNCTURE: CPT

## 2023-10-08 PROCEDURE — 96372 THER/PROPH/DIAG INJ SC/IM: CPT

## 2023-10-08 PROCEDURE — 82550 ASSAY OF CK (CPK): CPT

## 2023-10-08 PROCEDURE — 97535 SELF CARE MNGMENT TRAINING: CPT | Mod: CO,GO | Performed by: OCCUPATIONAL THERAPY ASSISTANT

## 2023-10-08 PROCEDURE — 2060000001 HC INTERMEDIATE ICU ROOM DAILY

## 2023-10-08 PROCEDURE — 2500000001 HC RX 250 WO HCPCS SELF ADMINISTERED DRUGS (ALT 637 FOR MEDICARE OP)

## 2023-10-08 PROCEDURE — 80048 BASIC METABOLIC PNL TOTAL CA: CPT

## 2023-10-08 PROCEDURE — 97530 THERAPEUTIC ACTIVITIES: CPT | Mod: CO,GO | Performed by: OCCUPATIONAL THERAPY ASSISTANT

## 2023-10-08 RX ADMIN — ASPIRIN 81 MG CHEWABLE TABLET 81 MG: 81 TABLET CHEWABLE at 09:37

## 2023-10-08 RX ADMIN — LOSARTAN POTASSIUM 25 MG: 50 TABLET, FILM COATED ORAL at 20:17

## 2023-10-08 RX ADMIN — ENOXAPARIN SODIUM 40 MG: 40 INJECTION SUBCUTANEOUS at 05:30

## 2023-10-08 ASSESSMENT — COGNITIVE AND FUNCTIONAL STATUS - GENERAL
MOVING FROM LYING ON BACK TO SITTING ON SIDE OF FLAT BED WITH BEDRAILS: A LITTLE
TOILETING: A LOT
STANDING UP FROM CHAIR USING ARMS: A LOT
PERSONAL GROOMING: A LITTLE
TURNING FROM BACK TO SIDE WHILE IN FLAT BAD: A LITTLE
STANDING UP FROM CHAIR USING ARMS: A LOT
MOBILITY SCORE: 13
DAILY ACTIVITIY SCORE: 16
PERSONAL GROOMING: A LITTLE
EATING MEALS: A LITTLE
TOILETING: A LITTLE
WALKING IN HOSPITAL ROOM: A LOT
HELP NEEDED FOR BATHING: A LOT
MOVING TO AND FROM BED TO CHAIR: A LOT
EATING MEALS: A LITTLE
DRESSING REGULAR LOWER BODY CLOTHING: A LITTLE
WALKING IN HOSPITAL ROOM: A LOT
DRESSING REGULAR UPPER BODY CLOTHING: A LITTLE
DRESSING REGULAR UPPER BODY CLOTHING: A LITTLE
DAILY ACTIVITIY SCORE: 16
DRESSING REGULAR LOWER BODY CLOTHING: A LITTLE
MOVING TO AND FROM BED TO CHAIR: A LOT
CLIMB 3 TO 5 STEPS WITH RAILING: TOTAL
MOVING FROM LYING ON BACK TO SITTING ON SIDE OF FLAT BED WITH BEDRAILS: A LITTLE
HELP NEEDED FOR BATHING: A LOT
HELP NEEDED FOR BATHING: A LOT
TOILETING: A LOT
MOBILITY SCORE: 13
TURNING FROM BACK TO SIDE WHILE IN FLAT BAD: A LITTLE
PERSONAL GROOMING: A LITTLE
DRESSING REGULAR UPPER BODY CLOTHING: A LITTLE
DRESSING REGULAR LOWER BODY CLOTHING: A LOT
CLIMB 3 TO 5 STEPS WITH RAILING: TOTAL
EATING MEALS: A LITTLE
DAILY ACTIVITIY SCORE: 16

## 2023-10-08 ASSESSMENT — PAIN SCALES - GENERAL
PAINLEVEL_OUTOF10: 0 - NO PAIN
PAINLEVEL_OUTOF10: 0 - NO PAIN

## 2023-10-08 ASSESSMENT — PAIN - FUNCTIONAL ASSESSMENT
PAIN_FUNCTIONAL_ASSESSMENT: 0-10
PAIN_FUNCTIONAL_ASSESSMENT: 0-10

## 2023-10-08 NOTE — PROGRESS NOTES
Occupational Therapy    Evaluation    Patient Name: Neha Carranza  MRN: [id number]  Today's Date: 10/8/2023  Time Calculation  Start Time: 1146  Stop Time: 1231  Time Calculation (min): 45 min        Assessment:  OT Assessment: Pt is a 82 y/o woman who was admitted for generalized weakness and rhabdo s/p fall  Prognosis: Good  Barriers to Discharge: None  Evaluation/Treatment Tolerance: Patient tolerated treatment well  Medical Staff Made Aware: Yes     Plan:  Treatment Interventions: Patient/family training, Endurance training, ADL retraining  OT Discharge Recommendations: Low intensity level of continued care  OT Recommended Transfer Status: Minimal assist  Treatment Interventions: Patient/family training, Endurance training, ADL retraining    Subjective   Current Problem:  1. Fall, initial encounter        2. Closed head injury, initial encounter        3. Generalized weakness        4. Elevated troponin        5. Traumatic rhabdomyolysis, initial encounter (CMS/[facility])        6. Syncope, unspecified syncope type  Transthoracic Echo (TTE) Complete    Transthoracic Echo (TTE) Complete    Vascular US carotid artery duplex bilateral    Vascular US carotid artery duplex bilateral      7. Cardiovascular symptoms        8. Chest pain, unspecified type  CANCELED: Nuclear Stress Test    CANCELED: Nuclear Stress Test    CANCELED: Cardiology Interpretation Of Nuclear Stress - See Other Report For Nuclear Portion    CANCELED: Cardiology Interpretation Of Nuclear Stress - See Other Report For Nuclear Portion        General:  General  Family/Caregiver Present: Yes  Prior to Session Communication: Bedside nurse  Preferred Learning Style: verbal, visual  General Comment: Chart Review Completed before session with nurse clearing pt for OT Services with no new concerns reported. Pt up in bedside chair with call button and all needs within reach upon exit. Alarm engaged. Pt ed on importance of calling for assistance for all  transfers/mobility with ed on use of call button. Pt able to engage call button, but questionable for recall. BLEVINS followed up with nurse for handoff  Precautions: Per OT Eval: Falls          Pain: Denied       Objective   Cognition: Impaired for ST Memory, New Ed                 ADL: LE Dressing donning/doffing socks pants with Min A from bedside chair      Activity Tolerance: to ` 1.5 mins while up completing clothing management and up ambulating to from commode bedside cahir     Bed Mobility/Transfers:     and Transfers  Transfer: Yes  Transfer 1  Transfer From 1: Sit to  Transfer to 1: Stand  Technique 1: Sit to stand  Transfer Device 1: Walker  Transfer Level of Assistance 1: Contact guard  Transfers 2  Transfer From 2: Stand to  Transfer to 2: Sit  Technique 2: Stand to sit  Transfer Device 2: Walker  Transfer Level of Assistance 2: Contact guard  Transfers 3  Transfer From 3: Sit to, Stand to  Transfer to 3: Commode-standard  Technique 3: Stand to sit  Transfer Device 3: Walker  Transfer Level of Assistance 3: Minimum assistance  Transfers 4  Transfer From 4: Sit to, Stand to  Transfer to 4: Chair with arms  Technique 4: Sit to stand  Transfer Device 4: Walker   Min A for Functional Mobility from WW Level    Outcome Measures:[facility] Daily Activity  Putting on and taking off regular lower body clothing: A little  Bathing (including washing, rinsing, drying): A lot  Putting on and taking off regular upper body clothing: A little  Toileting, which includes using toilet, bedpan or urinal: A lot  Taking care of personal grooming such as brushing teeth: A little  Eating Meals: A little  Daily Activity - Total Score: 16        Education Documentation  No documentation found.  Education Comments  No comments found.        OP EDUCATION:       Goals:  Encounter Problems       Encounter Problems (Active)       Balance       balance (Progressing)       Start:  10/04/23    Expected End:  10/18/23       Pt will demo  static/dynamic standing balance x5 minutes with B to U UE support and S/I to improve stability and decrease falls              Balance       Pt will demo good static/dynamic standing balance during ADL and functional activity with FWW for improved independence and participation in ADL  (Progressing)       Start:  10/05/23    Expected End:  10/19/23               Dressings Lower Extremities       Patient to complete lower body dressing with FWW at [facility]  (Progressing)       Start:  10/05/23    Expected End:  10/19/23               Mobility       Gait (Progressing)       Start:  10/04/23    Expected End:  10/18/23       3x100 feet with/without use of DME necessary to initiate return to PLOF           strength (Progressing)       Start:  10/04/23    Expected End:  10/18/23       X15+ reps ther ex to increase general strength and improve functional independence               Mobility       Pt will demo increased functional mobility to tolerate tasks necessary to complete ADL routine with FWW at [facility]  (Progressing)       Start:  10/05/23    Expected End:  10/19/23               Safety       LTG - Patient will adhere to hip precautions during ADL's and transfers       Start:  10/06/23            LTG - Patient will demonstrate safety requirements appropriate to situation/environment       Start:  10/06/23            LTG - Patient will utilize safety techniques       Start:  10/06/23            STG - Patient locks brakes on wheelchair       Start:  10/06/23            STG - Patient uses call light consistently to request assistance with transfers       Start:  10/06/23            STG - Patient uses gait belt during all transfers       Start:  10/06/23            Goal 1       Start:  10/06/23            Goal 2       Start:  10/06/23            Goal 3       Start:  10/06/23               Toileting       Patient will complete toileting tasks with FWW at mod I  (Progressing)       Start:  10/05/23    Expected End:  10/19/23                Transfers       transfer (Progressing)       Start:  10/04/23    Expected End:  10/18/23       Pt will complete all functional transfers with S/I necessary to initiate return to PLOF               Transfers       Pt will complete transfers with FWW at mod I  (Progressing)       Start:  10/05/23    Expected End:  10/19/23

## 2023-10-08 NOTE — PROGRESS NOTES
Occupational Therapy    OT Treatment    Patient Name: Neha Carranza  MRN: [id number]  Today's Date: 10/8/2023  Time Calculation  Start Time: 1146  Stop Time: 1232  Time Calculation (min): 46 min         Assessment:        Plan:  Treatment Interventions: ADL retraining, Functional transfer training, Endurance training, Patient/family training  OT Discharge Recommendations: Low intensity level of continued care  OT Recommended Transfer Status: Minimal assist  Treatment Interventions: ADL retraining, Functional transfer training, Endurance training, Patient/family training    Subjective   General:  OT Received On: 10/08/23  Family/Caregiver Present: Yes  Prior to Session Communication: Bedside nurse (Chart Review completed before session with nurse clearing pt for OT Services with no new concerns reported)  Preferred Learning Style: verbal, visual  General Comment: BLEVINS followed up with nurse for hand off with nurse aware of pt location. Call Button and all needs within reach at exit. Alarm engaged  Vital Signs:     Pain:  Pain Assessment  Pain Assessment:  (Denied)    Objective    Activities of Daily Living:         Functional Standing Tolerance:     Bed Mobility/Transfers: Transfers  Transfer: Yes  Transfer 1  Transfer From 1: Sit to  Transfer to 1: Stand  Technique 1: Sit to stand  Transfer Device 1: Walker  Transfer Level of Assistance 1: Contact guard  Trials/Comments 1: Multi Modal ed for ww safety technique  Transfers 2  Transfer From 2: Stand to  Transfer to 2: Sit  Technique 2: Stand to sit  Transfer Device 2: Walker  Transfer Level of Assistance 2: Contact guard  Transfers 3  Transfer From 3: Stand to  Transfer to 3: Commode-standard  Technique 3: Stand to sit  Transfer Device 3: Walker  Transfer Level of Assistance 3: Minimum assistance  Transfers 4  Transfer From 4: Sit to  Transfer to 4: Stand  Technique 4: Sit to stand  Transfer Device 4: Walker  Transfer Level of Assistance 4: Minimum  assistance  Trials/Comments 4: Transfer from SC with increased fatigue and assistance for static stand while completing clothing management  Transfers 6  Transfer From 6: Stand to  Transfer to 6: Sit  Technique 6: Stand to sit  Transfer Device 6: Walker  Transfer Level of Assistance 6: Minimum assistance  Trials/Comments 6: Increased cueing and assistance for ww safety technique while ambulating from bathroom to bedside chair secondary to fatigue       Modalities:        Therapy/Activity:             Outcome Measures:    Education Documentation  No documentation found.  Education Comments  No comments found.        OP EDUCATION:  Education  Individual(s) Educated: Patient, Child  Education Comment: Pt/Family provided multi modal ed for ww safety technique while completing transfers/functional mobility. Pt ed on importance of calling for assistance for all transfers/mobility with inability to recall after ~1 min. Pt ed on use of call button with G ability to engage device, but questionable for recall to use.    Goals:  Encounter Problems       Encounter Problems (Active)       Balance       balance (Progressing)       Start:  10/04/23    Expected End:  10/18/23       Pt will demo static/dynamic standing balance x5 minutes with B to U UE support and S/I to improve stability and decrease falls              Balance       Pt will demo good static/dynamic standing balance during ADL and functional activity with FWW for improved independence and participation in ADL  (Progressing)       Start:  10/05/23    Expected End:  10/19/23               Dressings Lower Extremities       Patient to complete lower body dressing with FWW at mod I  (Progressing)       Start:  10/05/23    Expected End:  10/19/23               Mobility       Gait (Progressing)       Start:  10/04/23    Expected End:  10/18/23       3x100 feet with/without use of DME necessary to initiate return to PLOF           strength (Progressing)       Start:   10/04/23    Expected End:  10/18/23       X15+ reps ther ex to increase general strength and improve functional independence               Mobility       Pt will demo increased functional mobility to tolerate tasks necessary to complete ADL routine with FWW at mod I  (Progressing)       Start:  10/05/23    Expected End:  10/19/23               Safety       LTG - Patient will adhere to hip precautions during ADL's and transfers       Start:  10/06/23            LTG - Patient will demonstrate safety requirements appropriate to situation/environment       Start:  10/06/23            LTG - Patient will utilize safety techniques       Start:  10/06/23            STG - Patient locks brakes on wheelchair       Start:  10/06/23            STG - Patient uses call light consistently to request assistance with transfers       Start:  10/06/23            STG - Patient uses gait belt during all transfers       Start:  10/06/23            Goal 1       Start:  10/06/23            Goal 2       Start:  10/06/23            Goal 3       Start:  10/06/23               Toileting       Patient will complete toileting tasks with FWW at mod I  (Progressing)       Start:  10/05/23    Expected End:  10/19/23               Transfers       transfer (Progressing)       Start:  10/04/23    Expected End:  10/18/23       Pt will complete all functional transfers with S/I necessary to initiate return to PLOF               Transfers       Pt will complete transfers with FWW at mod I  (Progressing)       Start:  10/05/23    Expected End:  10/19/23

## 2023-10-08 NOTE — PROGRESS NOTES
Subjective   Interval History: No acute events overnight. Patient has no complaints this AM. Denies fevers, chills, CP, SOB, N/V/D.    Objective   Vital signs in last 24 hours:  Temp:  [35.9 °C (96.7 °F)-36.5 °C (97.7 °F)] 35.9 °C (96.7 °F)  Heart Rate:  [77-79] 77  Resp:  [19-20] 20  BP: (115-168)/(68-87) 168/87    Intake/Output last 3 shifts:  No intake/output data recorded.  Intake/Output this shift:  No intake/output data recorded.    Physical Exam  Constitutional:       General: She is not in acute distress.  HENT:      Head: Normocephalic and atraumatic.   Eyes:      Extraocular Movements: Extraocular movements intact.      Conjunctiva/sclera: Conjunctivae normal.      Pupils: Pupils are equal, round, and reactive to light.   Cardiovascular:      Rate and Rhythm: Normal rate and regular rhythm.      Pulses: Normal pulses.   Pulmonary:      Effort: Pulmonary effort is normal. No respiratory distress.      Breath sounds: Normal breath sounds. No wheezing, rhonchi or rales.   Abdominal:      General: Bowel sounds are normal. There is no distension.      Palpations: Abdomen is soft.      Tenderness: There is no abdominal tenderness. There is no guarding.   Musculoskeletal:         General: Normal range of motion.   Skin:     General: Skin is warm and dry.   Neurological:      General: No focal deficit present.      Mental Status: She is alert and oriented to person, place, and time. Mental status is at baseline.   Psychiatric:         Mood and Affect: Mood normal.         Scheduled medications  ARIPiprazole, 10 mg, oral, Nightly  aspirin, 81 mg, oral, Daily  enoxaparin, 40 mg, subcutaneous, q24h  losartan, 25 mg, oral, Nightly      Continuous medications     PRN medications  PRN medications: acetaminophen **OR** acetaminophen **OR** acetaminophen, OLANZapine     Results  Lab Results   Component Value Date    WBC 7.1 10/08/2023    HGB 12.9 10/08/2023    HCT 39.5 10/08/2023     10/08/2023      10/08/2023      Lab Results   Component Value Date    GLUCOSE 82 10/08/2023    CALCIUM 8.4 (L) 10/08/2023     10/08/2023    K 3.6 10/08/2023    CO2 25 10/08/2023     10/08/2023    BUN 19 10/08/2023    CREATININE 1.09 (H) 10/08/2023    MG 1.80 10/07/2023      Assessment/Plan     Ms. Neha Carranza is an 83 year old female with PMHx of HTN and recurrent falls who presents to [facility] after suffering an unwitnessed fall at home with a down time of up to six hours. Being worked up for  cause of fall. Cardio on board. Pending dispo to SNF.     Acute Medical issues:     # Syncope vs Mechanical fall  # Deconditioning  # Rhabdomyolysis 2/2 Fall, improving  - Patient has history of multiple falls per month due to “dizziness” per patient's daughter. Less likely cardiac etiology given echo results. Less likely orthostatic hypotension given orthostats negative.  - Sees Neurology, Dr. Jeffrey Roe, outpatient. Sees cardiology, Dr. Hans Izquierdo outpatient  - CK 4,666 in ED. Repeat 3433 --> 2955  --> 2299 -->1574 --> 442  - Orthostatic vitals: BP is 135/73 heart rate 79, standing 136/95 heart rate 94  - Cardiology on board  - Carotid duplex US negative for significant stenosis of carotid arteries  - TTE (10/5) - EF 45-50%  - PT/OT eval - Pending SNF placement  - Plan for Stress test and [facility] OP w/ Dr. You     Addressed medical issues:     # Non-cardiac Troponin elevation:  - EKG reviewed, Rate 75, CA Interval 202ms,  ms, QT /QTc 440/491 ms, NSR, LAD, Left BBB  - Tele reviewed. Irregular heart rate  - Echo from 3/12/19: Showed preserved EF 55-60% with Mild concentric LVH, mild to moderate mitral regurgitation, and mil tricuspid regurgitation   - Troponin 911 --> 723 --> 633   - Cardio on board  - Monitor for now. Minimum c/f NSTEMI     Chronic medical issues:    # HTN  # CAD  - Continue home Aspirin 81 mg QD  - Continue home losartan 25 mg every day    # Bladder Cancer  - UA positive for  protein and blood  - Multiple surgeries (Jan, March, Aug 2023)  - No chemo, and not on any current medications    # Dementia  # Agitation  - Was previously been on rivastigmine 1.5 mg for mild dementia. Stopped recently due to dizziness as potential side effect  - Cont scheduled Aripiprazole 10 mg at night time  - Olanzapine 2.5 mg every day prn for agitation     Fluids:  None, PO intake  Electrolytes: Replete prn   Nutrition: Cardiac 2 g diet  GI prophylaxis: NA  VTE prophylaxis: Lovenox 40 mg QD  Supp O2: Stable on RA  Antibiotics: None   Code: Full Code      Disposition: Admitted due to unwitnessed fall and elevated CK and troponins. Currently patient awaiting placement. Pre-cert initiated.    Principal Problem:    Fall, initial encounter  Active Problems:    Impaired cognition    Syncope    Rhabdomyolysis      Annika Baldwin MD

## 2023-10-08 NOTE — PROGRESS NOTES
Occupational Therapy    OT Treatment    Patient Name: Neha Carranza  MRN: [id number]  Today's Date: 10/8/2023  Time Calculation  Start Time: 1146  Stop Time: 1231  Time Calculation (min): 45 min         Assessment:        Plan:  Treatment Interventions: Patient/family training, Endurance training, ADL retraining  OT Discharge Recommendations: Low intensity level of continued care  OT Recommended Transfer Status: Minimal assist  Treatment Interventions: Patient/family training, Endurance training, ADL retraining    Subjective   General:  OT Received On: 10/08/23  Family/Caregiver Present: Yes  Prior to Session Communication: Bedside nurse  Preferred Learning Style: verbal, visual  General Comment: Chart Review Completed before session with nurse clearing pt for OT Services with no new concerns reported. Pt up in bedside chair with call button and all needs within reach upon exit. Alarm engaged. Pt ed on importance of calling for assistance for all transfers/mobility with ed on use of call button. Pt able to engage call button, but questionable for recall. BLEVINS followed up with nurse for handoff  Vital Signs:     Pain:       Objective    Activities of Daily Living: LE Dressing  LE Dressing: Yes  Pants Level of Assistance: Minimum assistance  Sock Level of Assistance: Contact guard  LE Dressing Where Assessed: Chair  LE Dressing Comments: Increased time to complete    Toileting  Where Assessed: Toilet  Functional Standing Tolerance:  Time: ~1.5 mins  Activity: Clothing management/functional mobility/le dressing  Functional Standing Tolerance Comments: Increased assistance for unsupported standing  Bed Mobility/Transfers: Transfers  Transfer: Yes  Transfer 1  Transfer From 1: Sit to  Transfer to 1: Stand  Technique 1: Sit to stand  Transfer Device 1: Walker  Transfer Level of Assistance 1: Contact guard  Transfers 2  Transfer From 2: Stand to  Transfer to 2: Sit  Technique 2: Stand to sit  Transfer Device 2:  Walker  Transfer Level of Assistance 2: Contact guard  Transfers 3  Transfer From 3: Sit to, Stand to  Transfer to 3: Commode-standard  Technique 3: Stand to sit  Transfer Device 3: Walker  Transfer Level of Assistance 3: Minimum assistance  Transfers 4  Transfer From 4: Sit to, Stand to  Transfer to 4: Chair with arms  Technique 4: Sit to stand  Transfer Device 4: Walker           Outcome Measures:[facility] Daily Activity  Putting on and taking off regular lower body clothing: A little  Bathing (including washing, rinsing, drying): A lot  Putting on and taking off regular upper body clothing: A little  Toileting, which includes using toilet, bedpan or urinal: A lot  Taking care of personal grooming such as brushing teeth: A little  Eating Meals: A little  Daily Activity - Total Score: 16        Education Documentation  No documentation found.  Education Comments  No comments found.        OP EDUCATION:       Goals:  Encounter Problems       Encounter Problems (Active)       Balance       balance (Progressing)       Start:  10/04/23    Expected End:  10/18/23       Pt will demo static/dynamic standing balance x5 minutes with B to U UE support and S/I to improve stability and decrease falls              Balance       Pt will demo good static/dynamic standing balance during ADL and functional activity with FWW for improved independence and participation in ADL  (Progressing)       Start:  10/05/23    Expected End:  10/19/23               Dressings Lower Extremities       Patient to complete lower body dressing with FWW at mod I  (Progressing)       Start:  10/05/23    Expected End:  10/19/23               Mobility       Gait (Progressing)       Start:  10/04/23    Expected End:  10/18/23       3x100 feet with/without use of DME necessary to initiate return to PLOF           strength (Progressing)       Start:  10/04/23    Expected End:  10/18/23       X15+ reps ther ex to increase general strength and improve functional  independence               Mobility       Pt will demo increased functional mobility to tolerate tasks necessary to complete ADL routine with FWW at [facility]  (Progressing)       Start:  10/05/23    Expected End:  10/19/23               Safety       LTG - Patient will adhere to hip precautions during ADL's and transfers       Start:  10/06/23            LTG - Patient will demonstrate safety requirements appropriate to situation/environment       Start:  10/06/23            LTG - Patient will utilize safety techniques       Start:  10/06/23            STG - Patient locks brakes on wheelchair       Start:  10/06/23            STG - Patient uses call light consistently to request assistance with transfers       Start:  10/06/23            STG - Patient uses gait belt during all transfers       Start:  10/06/23            Goal 1       Start:  10/06/23            Goal 2       Start:  10/06/23            Goal 3       Start:  10/06/23               Toileting       Patient will complete toileting tasks with FWW at mod I  (Progressing)       Start:  10/05/23    Expected End:  10/19/23               Transfers       transfer (Progressing)       Start:  10/04/23    Expected End:  10/18/23       Pt will complete all functional transfers with S/I necessary to initiate return to PLOF               Transfers       Pt will complete transfers with FWW at [facility]  (Progressing)       Start:  10/05/23    Expected End:  10/19/23

## 2023-10-08 NOTE — CARE PLAN
Problem: Balance  Goal: balance  Description: Pt will demo static/dynamic standing balance x5 minutes with B to U UE support and S/I to improve stability and decrease falls    Outcome: Progressing     Problem: Mobility  Goal: Gait  Description: 3x100 feet with/without use of DME necessary to initiate return to PLOF    Outcome: Progressing  Goal: strength  Description: X15+ reps ther ex to increase general strength and improve functional independence     Outcome: Progressing     Problem: Transfers  Goal: transfer  Description: Pt will complete all functional transfers with S/I necessary to initiate return to PLOF     Outcome: Progressing     Problem: Balance  Goal: Pt will demo good static/dynamic standing balance during ADL and functional activity with FWW for improved independence and participation in ADL   10/8/2023 1751 by FLOR Espitia  Outcome: Progressing  10/8/2023 1700 by FLOR Espitia  Outcome: Progressing  10/8/2023 1318 by FLOR Espitia  Outcome: Progressing     Problem: Dressings Lower Extremities  Goal: Patient to complete lower body dressing with FWW at mod I   10/8/2023 1751 by FLOR Espitia  Outcome: Progressing  10/8/2023 1700 by FLOR Espitia  Outcome: Progressing  10/8/2023 1318 by FLOR Espitia  Outcome: Progressing     Problem: Mobility  Goal: Pt will demo increased functional mobility to tolerate tasks necessary to complete ADL routine with FWW at mod I   10/8/2023 1751 by FLOR Espitia  Outcome: Progressing  10/8/2023 1700 by FLOR Espitia  Outcome: Progressing  10/8/2023 1318 by FLOR Espitia  Outcome: Progressing     Problem: Toileting  Goal: Patient will complete toileting tasks with FWW at mod I   10/8/2023 1751 by FLOR Espitia  Outcome: Progressing  10/8/2023 1700 by FLOR Espitia  Outcome: Progressing  10/8/2023 1318 by FLOR Espitia  Outcome: Progressing     Problem: Transfers  Goal: Pt will complete transfers with FWW at mod I   10/8/2023 1751 by FLOR Espitia  Outcome:  Progressing  10/8/2023 1700 by FLOR Espitia  Outcome: Progressing  10/8/2023 1318 by FLOR Espitia  Outcome: Progressing

## 2023-10-08 NOTE — CARE PLAN
Problem: Balance  Goal: balance  Description: Pt will demo static/dynamic standing balance x5 minutes with B to U UE support and S/I to improve stability and decrease falls    Outcome: Progressing     Problem: Mobility  Goal: Gait  Description: 3x100 feet with/without use of DME necessary to initiate return to PLOF    Outcome: Progressing  Goal: strength  Description: X15+ reps ther ex to increase general strength and improve functional independence     Outcome: Progressing     Problem: Transfers  Goal: transfer  Description: Pt will complete all functional transfers with S/I necessary to initiate return to PLOF     Outcome: Progressing     Problem: Balance  Goal: Pt will demo good static/dynamic standing balance during ADL and functional activity with FWW for improved independence and participation in ADL   10/8/2023 1700 by FLOR Espitia  Outcome: Progressing  10/8/2023 1318 by FLOR Espitia  Outcome: Progressing     Problem: Dressings Lower Extremities  Goal: Patient to complete lower body dressing with FWW at mod I   10/8/2023 1700 by FLOR Espitia  Outcome: Progressing  10/8/2023 1318 by FLOR Espitia  Outcome: Progressing     Problem: Mobility  Goal: Pt will demo increased functional mobility to tolerate tasks necessary to complete ADL routine with FWW at mod I   10/8/2023 1700 by FLOR Espitia  Outcome: Progressing  10/8/2023 1318 by FLOR Espitia  Outcome: Progressing     Problem: Toileting  Goal: Patient will complete toileting tasks with FWW at mod I   10/8/2023 1700 by FLOR Espitia  Outcome: Progressing  10/8/2023 1318 by FLOR Espitia  Outcome: Progressing     Problem: Transfers  Goal: Pt will complete transfers with FWW at mod I   10/8/2023 1700 by FLOR Espitia  Outcome: Progressing  10/8/2023 1318 by FLOR Espitia  Outcome: Progressing

## 2023-10-09 LAB
ALBUMIN SERPL BCP-MCNC: 3.6 G/DL (ref 3.4–5)
ANION GAP SERPL CALC-SCNC: 12 MMOL/L (ref 10–20)
BUN SERPL-MCNC: 17 MG/DL (ref 6–23)
CALCIUM SERPL-MCNC: 8.6 MG/DL (ref 8.6–10.3)
CHLORIDE SERPL-SCNC: 105 MMOL/L (ref 98–107)
CK MB CFR SERPL CALC: 1 %MB OF CK
CK MB SERPL-CCNC: 4.2 NG/ML
CK SERPL-CCNC: 303 U/L (ref 0–215)
CO2 SERPL-SCNC: 26 MMOL/L (ref 21–32)
CREAT SERPL-MCNC: 1.05 MG/DL (ref 0.5–1.05)
ERYTHROCYTE [DISTWIDTH] IN BLOOD BY AUTOMATED COUNT: 12.4 % (ref 11.5–14.5)
GFR SERPL CREATININE-BSD FRML MDRD: 53 ML/MIN/1.73M*2
GLUCOSE SERPL-MCNC: 84 MG/DL (ref 74–99)
HCT VFR BLD AUTO: 39.7 % (ref 36–46)
HGB BLD-MCNC: 13 G/DL (ref 12–16)
MAGNESIUM SERPL-MCNC: 1.9 MG/DL (ref 1.6–2.4)
MCH RBC QN AUTO: 32.9 PG (ref 26–34)
MCHC RBC AUTO-ENTMCNC: 32.7 G/DL (ref 32–36)
MCV RBC AUTO: 101 FL (ref 80–100)
NRBC BLD-RTO: 0 /100 WBCS (ref 0–0)
PHOSPHATE SERPL-MCNC: 3.4 MG/DL (ref 2.5–4.9)
PLATELET # BLD AUTO: 182 X10*3/UL (ref 150–450)
PMV BLD AUTO: 10.3 FL (ref 7.5–11.5)
POTASSIUM SERPL-SCNC: 3.7 MMOL/L (ref 3.5–5.3)
RBC # BLD AUTO: 3.95 X10*6/UL (ref 4–5.2)
SODIUM SERPL-SCNC: 139 MMOL/L (ref 136–145)
WBC # BLD AUTO: 7.3 X10*3/UL (ref 4.4–11.3)

## 2023-10-09 PROCEDURE — 96372 THER/PROPH/DIAG INJ SC/IM: CPT

## 2023-10-09 PROCEDURE — 84100 ASSAY OF PHOSPHORUS: CPT

## 2023-10-09 PROCEDURE — 2500000001 HC RX 250 WO HCPCS SELF ADMINISTERED DRUGS (ALT 637 FOR MEDICARE OP)

## 2023-10-09 PROCEDURE — 83735 ASSAY OF MAGNESIUM: CPT

## 2023-10-09 PROCEDURE — 97530 THERAPEUTIC ACTIVITIES: CPT | Mod: GO

## 2023-10-09 PROCEDURE — 97110 THERAPEUTIC EXERCISES: CPT | Mod: GP,CQ

## 2023-10-09 PROCEDURE — 2060000001 HC INTERMEDIATE ICU ROOM DAILY

## 2023-10-09 PROCEDURE — 36415 COLL VENOUS BLD VENIPUNCTURE: CPT

## 2023-10-09 PROCEDURE — 82550 ASSAY OF CK (CPK): CPT

## 2023-10-09 PROCEDURE — 99232 SBSQ HOSP IP/OBS MODERATE 35: CPT

## 2023-10-09 PROCEDURE — 97116 GAIT TRAINING THERAPY: CPT | Mod: GP

## 2023-10-09 PROCEDURE — 2500000004 HC RX 250 GENERAL PHARMACY W/ HCPCS (ALT 636 FOR OP/ED)

## 2023-10-09 PROCEDURE — 82553 CREATINE MB FRACTION: CPT | Mod: CMCLAB,GEALAB

## 2023-10-09 PROCEDURE — 85027 COMPLETE CBC AUTOMATED: CPT

## 2023-10-09 PROCEDURE — 36415 COLL VENOUS BLD VENIPUNCTURE: CPT | Mod: GEALAB

## 2023-10-09 RX ADMIN — LOSARTAN POTASSIUM 25 MG: 50 TABLET, FILM COATED ORAL at 20:37

## 2023-10-09 RX ADMIN — ENOXAPARIN SODIUM 40 MG: 40 INJECTION SUBCUTANEOUS at 04:16

## 2023-10-09 RX ADMIN — ASPIRIN 81 MG CHEWABLE TABLET 81 MG: 81 TABLET CHEWABLE at 09:09

## 2023-10-09 ASSESSMENT — COGNITIVE AND FUNCTIONAL STATUS - GENERAL
EATING MEALS: A LITTLE
TURNING FROM BACK TO SIDE WHILE IN FLAT BAD: A LITTLE
DRESSING REGULAR LOWER BODY CLOTHING: A LITTLE
MOVING FROM LYING ON BACK TO SITTING ON SIDE OF FLAT BED WITH BEDRAILS: A LITTLE
MOBILITY SCORE: 16
WALKING IN HOSPITAL ROOM: A LOT
STANDING UP FROM CHAIR USING ARMS: A LITTLE
MOVING TO AND FROM BED TO CHAIR: A LITTLE
DAILY ACTIVITIY SCORE: 16
EATING MEALS: A LITTLE
TOILETING: A LOT
MOVING FROM LYING ON BACK TO SITTING ON SIDE OF FLAT BED WITH BEDRAILS: A LITTLE
PERSONAL GROOMING: A LITTLE
MOVING TO AND FROM BED TO CHAIR: A LITTLE
MOVING TO AND FROM BED TO CHAIR: A LITTLE
MOBILITY SCORE: 16
DAILY ACTIVITIY SCORE: 16
PERSONAL GROOMING: A LITTLE
CLIMB 3 TO 5 STEPS WITH RAILING: A LOT
TURNING FROM BACK TO SIDE WHILE IN FLAT BAD: A LITTLE
DRESSING REGULAR LOWER BODY CLOTHING: A LITTLE
TURNING FROM BACK TO SIDE WHILE IN FLAT BAD: A LITTLE
STANDING UP FROM CHAIR USING ARMS: A LITTLE
HELP NEEDED FOR BATHING: A LOT
WALKING IN HOSPITAL ROOM: A LOT
TOILETING: A LOT
MOBILITY SCORE: 16
DRESSING REGULAR UPPER BODY CLOTHING: A LITTLE
CLIMB 3 TO 5 STEPS WITH RAILING: A LOT
HELP NEEDED FOR BATHING: A LOT
CLIMB 3 TO 5 STEPS WITH RAILING: A LOT
DRESSING REGULAR UPPER BODY CLOTHING: A LITTLE
MOVING FROM LYING ON BACK TO SITTING ON SIDE OF FLAT BED WITH BEDRAILS: A LITTLE
STANDING UP FROM CHAIR USING ARMS: A LITTLE
WALKING IN HOSPITAL ROOM: A LOT

## 2023-10-09 ASSESSMENT — PAIN - FUNCTIONAL ASSESSMENT
PAIN_FUNCTIONAL_ASSESSMENT: 0-10
PAIN_FUNCTIONAL_ASSESSMENT: 0-10

## 2023-10-09 ASSESSMENT — PAIN SCALES - GENERAL
PAINLEVEL_OUTOF10: 0 - NO PAIN
PAINLEVEL_OUTOF10: 0 - NO PAIN

## 2023-10-09 NOTE — PROGRESS NOTES
10/09/23 1015   Discharge Planning   Living Arrangements Alone   Support Systems Family members   Assistance Needed Caregiver Mon-Sat and daughter checks on patient frequently   Type of Residence Private residence   Home or Post Acute Services Post acute facilities (Rehab/SNF/etc)   Type of Post Acute Facility Services Skilled nursing   Patient expects to be discharged to: [facility] is preference   Does the patient need discharge transport arranged? Yes   RoundTrip coordination needed? Yes   Has discharge transport been arranged? No     11:11: Spoke with patient and daughter. PT working with patient at the moment. Precert will be started once therapy note complete. Patient and daughter are aware.    10/10/23 1102: Auth obtained , daughter and patient aware of a 5 pm  time.

## 2023-10-09 NOTE — PROGRESS NOTES
"Occupational Therapy                 Therapy Communication Note    Patient Name: Neha Carranza  MRN: [id number]  Today's Date: 10/9/2023     Discipline: Occupational Therapy    Missed Visit Reason: Missed Visit Reason: Parent refused (Pt refused treatment despite repeated attempts verbalizing paranoid ideation and inisiting on immediate discharge to rehab. Pt reported \"not trusting anyone, and not liking all the games being played with her.\")    Missed Time: Attempt    Comment: Discussed pt refusal and threat to sign out AMA with care coordinator.  "

## 2023-10-09 NOTE — PROGRESS NOTES
Subjective   Interval History: No acute events overnight. Patient continues to have dizziness and lightheadedness, however she notes that it is improved compared to on admission. Has been ambulating to the restroom. No complaints.    Objective   Vital signs in last 24 hours:  Temp:  [36.1 °C (97 °F)-36.3 °C (97.3 °F)] 36.3 °C (97.3 °F)  Heart Rate:  [71-83] 73  Resp:  [18] 18  BP: (103-170)/(60-97) 168/78    Intake/Output last 3 shifts:  No intake/output data recorded.  Intake/Output this shift:  No intake/output data recorded.    Physical Exam  Constitutional:       General: She is not in acute distress.  HENT:      Head: Normocephalic and atraumatic.   Eyes:      Extraocular Movements: Extraocular movements intact.      Conjunctiva/sclera: Conjunctivae normal.      Pupils: Pupils are equal, round, and reactive to light.   Cardiovascular:      Rate and Rhythm: Normal rate and regular rhythm.      Pulses: Normal pulses.   Pulmonary:      Effort: Pulmonary effort is normal. No respiratory distress.      Breath sounds: Normal breath sounds. No wheezing, rhonchi or rales.   Abdominal:      General: Bowel sounds are normal. There is no distension.      Palpations: Abdomen is soft.      Tenderness: There is no abdominal tenderness. There is no guarding.   Musculoskeletal:         General: Normal range of motion.   Skin:     General: Skin is warm and dry.   Neurological:      General: No focal deficit present.      Mental Status: She is alert. Mental status is at baseline.      Comments: Oriented to person and time, not place. At baseline.   Psychiatric:         Mood and Affect: Mood normal.         Scheduled medications  aspirin, 81 mg, oral, Daily  enoxaparin, 40 mg, subcutaneous, q24h  losartan, 25 mg, oral, Nightly      Continuous medications     PRN medications  PRN medications: acetaminophen **OR** acetaminophen **OR** acetaminophen, OLANZapine     Results  Lab Results   Component Value Date    WBC 7.3 10/09/2023     HGB 13.0 10/09/2023    HCT 39.7 10/09/2023     (H) 10/09/2023     10/09/2023      Lab Results   Component Value Date    GLUCOSE 84 10/09/2023    CALCIUM 8.6 10/09/2023     10/09/2023    K 3.7 10/09/2023    CO2 26 10/09/2023     10/09/2023    BUN 17 10/09/2023    CREATININE 1.05 10/09/2023    MG 1.90 10/09/2023      Assessment/Plan     Ms. Neha Carranza is an 83 year old female with PMHx of HTN and recurrent falls who presents to [facility] after suffering an unwitnessed fall at home with a down time of up to six hours. Being worked up for  cause of fall. Cardio on board. Pending dispo to SNF.     Acute Medical issues:     # Syncope vs Mechanical fall  # Deconditioning  # Rhabdomyolysis 2/2 Fall, improving  - Patient has history of multiple falls per month due to “dizziness” per patient's daughter. Less likely cardiac etiology given echo results. Less likely orthostatic hypotension given orthostats negative.  - Sees Neurology, Dr. Jeffrey Roe, outpatient. Sees cardiology, Dr. Hans Izquierdo outpatient  - CK 4,666 in ED. Repeat 3433 --> 2955  --> 2299 -->1574 --> 442  - Orthostatic vitals: BP is 135/73 heart rate 79, standing 136/95 heart rate 94  - Cardiology on board  - Carotid duplex US negative for significant stenosis of carotid arteries  - TTE (10/5) - EF 45-50%  - PT/OT eval - Pending SNF placement  - Plan for Stress test and [facility] OP w/ Dr. You     Addressed medical issues:     # Non-cardiac Troponin elevation:  - EKG reviewed, Rate 75, MO Interval 202ms,  ms, QT /QTc 440/491 ms, NSR, LAD, Left BBB  - Tele reviewed. Irregular heart rate  - Echo from 3/12/19: Showed preserved EF 55-60% with Mild concentric LVH, mild to moderate mitral regurgitation, and mil tricuspid regurgitation   - Troponin 911 --> 723 --> 633   - Cardio on board  - Monitor for now. Minimum c/f NSTEMI     Chronic medical issues:     # HTN  # CAD  - Continue home Aspirin 81 mg QD  - Continue  home losartan 25 mg every day     # Bladder Cancer  - UA positive for protein and blood  - Multiple surgeries (Jan, March, Aug 2023)  - No chemo, and not on any current medications     # Dementia  # Agitation  - Was previously been on rivastigmine 1.5 mg for mild dementia. Stopped recently due to dizziness as potential side effect  - Olanzapine 2.5 mg every day prn for agitation     Fluids:  None, PO intake  Electrolytes: Replete prn   Nutrition: Cardiac 2 g diet  GI prophylaxis: NA  VTE prophylaxis: Lovenox 40 mg QD  Supp O2: Stable on RA  Antibiotics: None   Code: Full Code      Disposition: Admitted due to unwitnessed fall and elevated CK and troponins. Currently patient awaiting placement. Pre-cert initiated.    Principal Problem:    Fall, initial encounter  Active Problems:    Impaired cognition    Syncope    Rhabdomyolysis      Annika Baldwin MD

## 2023-10-09 NOTE — PROGRESS NOTES
Physical Therapy    Physical Therapy Treatment    Patient Name: Neha Carranza  MRN: [id number]  Today's Date: 10/9/2023  Time Calculation  Start Time: 1058  Stop Time: 1137  Time Calculation (min): 39 min       Assessment/Plan   PT Assessment  PT Assessment Results: Decreased strength, Decreased endurance, Impaired balance, Decreased mobility, Decreased cognition, Impaired judgement, Decreased safety awareness  Rehab Prognosis: Excellent  Evaluation/Treatment Tolerance: Patient tolerated treatment well  Medical Staff Made Aware: Yes  Strengths: Attitude of self, Ability to acquire knowledge  End of Session Communication: Bedside nurse  Assessment Comment: pt demonstrates decreased functional strength, balance, and gait tolerance. Recommend consistent supervision and assist at this time to maintain pt safety. High falls risk as assessed with h/o 3+ falls x6 months. Rec mod despite high AMPAC.  End of Session Patient Position: Up in chair, Alarm on     PT Plan  Treatment/Interventions: Bed mobility, Transfer training, Gait training, Balance training, Therapeutic exercise  PT Plan: Skilled PT  PT Frequency: 3 times per week  PT Discharge Recommendations: Moderate intensity level of continued care  PT Recommended Transfer Status: Assist x1      General Visit Information:   PT  Visit  PT Received On: 10/09/23  General  Reason for Referral: Impaired mobility. (B LE weakness. Fall)  Past Medical History Relevant to Rehab: Dementia, dizziness, falls  Family/Caregiver Present: Yes  Caregiver Feedback: Daughter (Patient lives alone but has caregivers stop by daily. Per patient's daughter patient becomes dizzy and has had multiple falls in the past few months.)  Prior to Session Communication: Bedside nurse, Care Coordinator  Patient Position Received: Alarm on, Up in chair  Preferred Learning Style: verbal, visual  General Comment: Demo, verbal instructions provided    Subjective   Precautions:  Precautions  Medical  Precautions: Fall precautions  Vital Signs:       Objective   Pain:     Cognition:  Cognition  Overall Cognitive Status: Within Functional Limits  Orientation Level: Disoriented to situation, Disoriented to person, Disoriented to place  Cognition Comments: Mildly confused intermittently (Patient requires consistent VC/TC to follow instructions.)  Attention: Within Functional Limits  Memory: Within Funtional Limits  Problem Solving:  (Patient requires assistance with problem solving)  Safety/Judgement:  (Patient requires consistent VC/TC for safety and judgement)  Impulsive: Mildly  Processing Speed: Delayed  Postural Control:  Postural Control  Postural Control: Impaired  Head Control: Forward flexed head (Patient corrects with cueing)  Trunk Control: Forward trunk in standing  Extremity/Trunk Assessments:     Activity Tolerance:  Activity Tolerance  Endurance: Tolerates 10 - 20 min exercise with multiple rests  Activity Tolerance Comments:  (Worked on endurance and balance)  Treatments:  Therapeutic Exercise  Therapeutic Exercise Performed: Yes  Therapeutic Exercise Activity 1: Standing Ther x 10 each B LE. Using FWW for B LE support (Hip fles, alt marches, HR/TR, HS curls, Abd)  Therapeutic Exercise Activity 2: Seated ther ex B LE x 10-15 each (HR / TR, Abd/add (resisted), HS, Hip flex, LAQ)    Therapeutic Activity  Therapeutic Activity Performed: Yes  Therapeutic Activity 1: Sit <>stand trials x 5 throughout therapy (Multiple sit<>stand practices to educate patient on hand placement, sequencing and safe technique to prevent further risk of falls.)    Ambulation/Gait Training  Ambulation/Gait Training Performed: Yes  Ambulation/Gait Training 1  Surface 1: Level tile  Device 1: Rolling walker  Assistance 1: Contact guard, Minimum assistance, Moderate verbal cues  Quality of Gait 1: Inconsistent stride length (Patient stands outside of FWW, (uses rollator at home), decreased stride length, decreased step  length)  Comments/Distance (ft) 1: 15 ' x 2 within room (Patient ambulated within room with directional changes. This PTA assisting patient with FWW management and navigating obstacles. ModA VC/TC for safety with FWW and standing inside FWW.)  Transfers  Transfer: Yes  Transfer 1  Transfer From 1: Sit to, Chair with arms to  Transfer to 1: Stand  Technique 1: Sit to stand  Transfer Device 1: Walker  Transfer Level of Assistance 1: Minimum assistance  Trials/Comments 1: SIT<>STAND (Patient required much education on safety and technique for this transfer. Patient consistently grabs for FWW to stand and hold FWW to sit (plopping) unsafely into chair.)    Outcome Measures:  [facility] Basic Mobility  Turning from your back to your side while in a flat bed without using bedrails: A little  Moving from lying on your back to sitting on the side of a flat bed without using bedrails: A little  Moving to and from bed to chair (including a wheelchair): A little  Standing up from a chair using your arms (e.g. wheelchair or bedside chair): A little  To walk in hospital room: A lot  Climbing 3-5 steps with railing: A lot  Basic Mobility - Total Score: 16    Education Documentation  Body Mechanics, taught by Susy Carlisle PTA at 10/9/2023 12:12 PM.  Learner: Family, Patient  Readiness: Eager  Method: Explanation, Demonstration, Teach-back  Response: Verbalizes Understanding, Needs Reinforcement    Home Exercise Program, taught by uSsy Carlisle PTA at 10/9/2023 12:12 PM.  Learner: Family, Patient  Readiness: Eager  Method: Explanation, Demonstration, Teach-back  Response: Verbalizes Understanding, Needs Reinforcement    Precautions, taught by Susy Carlisle PTA at 10/9/2023 12:12 PM.  Learner: Family, Patient  Readiness: Eager  Method: Explanation, Demonstration, Teach-back  Response: Verbalizes Understanding, Needs Reinforcement    Mobility Training, taught by Susy Carlisle PTA at 10/9/2023 12:12 PM.  Learner: Family,  Patient  Readiness: Eager  Method: Explanation, Demonstration, Teach-back  Response: Verbalizes Understanding, Needs Reinforcement    Education Comments  No comments found.        OP EDUCATION:  Education  Individual(s) Educated: Patient, Child  Education Provided: Body Mechanics, Fall Risk, Home Exercise Program, Home Safety  Diagnosis and Precautions: High fall risk  Risk and Benefits Discussed with Patient/Caregiver/Other: yes  Patient/Caregiver Demonstrated Understanding: yes  Plan of Care Discussed and Agreed Upon: yes  Patient Response to Education: Patient/Caregiver Verbalized Understanding of Information, Patient/Caregiver Performed Return Demonstration of Exercises/Activities, Patient/Caregiver Asked Appropriate Questions    Encounter Problems       Encounter Problems (Active)       Balance       balance (Progressing)       Start:  10/04/23    Expected End:  10/18/23       Pt will demo static/dynamic standing balance x5 minutes with B to U UE support and S/I to improve stability and decrease falls              Balance       Pt will demo good static/dynamic standing balance during ADL and functional activity with FWW for improved independence and participation in ADL  (Progressing)       Start:  10/05/23    Expected End:  10/19/23               Mobility       Gait (Progressing)       Start:  10/04/23    Expected End:  10/18/23       3x100 feet with/without use of DME necessary to initiate return to PLOF           strength (Progressing)       Start:  10/04/23    Expected End:  10/18/23       X15+ reps ther ex to increase general strength and improve functional independence               Mobility       Pt will demo increased functional mobility to tolerate tasks necessary to complete ADL routine with FWW at mod I  (Progressing)       Start:  10/05/23    Expected End:  10/19/23               Safety       LTG - Patient will adhere to hip precautions during ADL's and transfers       Start:  10/06/23             LTG - Patient will demonstrate safety requirements appropriate to situation/environment       Start:  10/06/23            LTG - Patient will utilize safety techniques       Start:  10/06/23            STG - Patient locks brakes on wheelchair       Start:  10/06/23            STG - Patient uses call light consistently to request assistance with transfers       Start:  10/06/23            STG - Patient uses gait belt during all transfers       Start:  10/06/23            Goal 1       Start:  10/06/23            Goal 2       Start:  10/06/23            Goal 3       Start:  10/06/23               Transfers       transfer (Progressing)       Start:  10/04/23    Expected End:  10/18/23       Pt will complete all functional transfers with S/I necessary to initiate return to PLOF               Transfers       Pt will complete transfers with FWW at mod I  (Progressing)       Start:  10/05/23    Expected End:  10/19/23

## 2023-10-10 VITALS
SYSTOLIC BLOOD PRESSURE: 140 MMHG | OXYGEN SATURATION: 98 % | WEIGHT: 121.25 LBS | BODY MASS INDEX: 23.81 KG/M2 | DIASTOLIC BLOOD PRESSURE: 79 MMHG | RESPIRATION RATE: 18 BRPM | TEMPERATURE: 97.3 F | HEART RATE: 92 BPM | HEIGHT: 60 IN
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
## 2023-10-10 PROBLEM — M62.82 RHABDOMYOLYSIS: Status: RESOLVED | Noted: 2023-10-06 | Resolved: 2023-10-10

## 2023-10-10 PROBLEM — R55 SYNCOPE: Status: RESOLVED | Noted: 2023-09-02 | Resolved: 2023-10-10
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
## 2023-10-10 LAB
ALBUMIN SERPL BCP-MCNC: 3.8 G/DL (ref 3.4–5)
ANION GAP SERPL CALC-SCNC: 11 MMOL/L (ref 10–20)
BUN SERPL-MCNC: 19 MG/DL (ref 6–23)
CALCIUM SERPL-MCNC: 8.9 MG/DL (ref 8.6–10.3)
CHLORIDE SERPL-SCNC: 104 MMOL/L (ref 98–107)
CK SERPL-CCNC: 194 U/L (ref 0–215)
CO2 SERPL-SCNC: 26 MMOL/L (ref 21–32)
CREAT SERPL-MCNC: 1.14 MG/DL (ref 0.5–1.05)
ERYTHROCYTE [DISTWIDTH] IN BLOOD BY AUTOMATED COUNT: 12.4 % (ref 11.5–14.5)
GFR SERPL CREATININE-BSD FRML MDRD: 48 ML/MIN/1.73M*2
GLUCOSE SERPL-MCNC: 115 MG/DL (ref 74–99)
HCT VFR BLD AUTO: 43.5 % (ref 36–46)
HGB BLD-MCNC: 14.1 G/DL (ref 12–16)
MAGNESIUM SERPL-MCNC: 2.05 MG/DL (ref 1.6–2.4)
MCH RBC QN AUTO: 32.4 PG (ref 26–34)
MCHC RBC AUTO-ENTMCNC: 32.4 G/DL (ref 32–36)
MCV RBC AUTO: 100 FL (ref 80–100)
NRBC BLD-RTO: 0 /100 WBCS (ref 0–0)
PHOSPHATE SERPL-MCNC: 3.5 MG/DL (ref 2.5–4.9)
PLATELET # BLD AUTO: 198 X10*3/UL (ref 150–450)
PMV BLD AUTO: 10.2 FL (ref 7.5–11.5)
POTASSIUM SERPL-SCNC: 3.9 MMOL/L (ref 3.5–5.3)
RBC # BLD AUTO: 4.35 X10*6/UL (ref 4–5.2)
SODIUM SERPL-SCNC: 137 MMOL/L (ref 136–145)
WBC # BLD AUTO: 8.7 X10*3/UL (ref 4.4–11.3)

## 2023-10-10 PROCEDURE — 96372 THER/PROPH/DIAG INJ SC/IM: CPT

## 2023-10-10 PROCEDURE — 36415 COLL VENOUS BLD VENIPUNCTURE: CPT

## 2023-10-10 PROCEDURE — 97535 SELF CARE MNGMENT TRAINING: CPT | Mod: GO,CO

## 2023-10-10 PROCEDURE — 2500000004 HC RX 250 GENERAL PHARMACY W/ HCPCS (ALT 636 FOR OP/ED)

## 2023-10-10 PROCEDURE — 82565 ASSAY OF CREATININE: CPT

## 2023-10-10 PROCEDURE — 99239 HOSP IP/OBS DSCHRG MGMT >30: CPT

## 2023-10-10 PROCEDURE — 83735 ASSAY OF MAGNESIUM: CPT

## 2023-10-10 PROCEDURE — 85027 COMPLETE CBC AUTOMATED: CPT

## 2023-10-10 PROCEDURE — 2500000001 HC RX 250 WO HCPCS SELF ADMINISTERED DRUGS (ALT 637 FOR MEDICARE OP)

## 2023-10-10 PROCEDURE — 82550 ASSAY OF CK (CPK): CPT

## 2023-10-10 RX ADMIN — ENOXAPARIN SODIUM 40 MG: 40 INJECTION SUBCUTANEOUS at 05:15

## 2023-10-10 RX ADMIN — ASPIRIN 81 MG CHEWABLE TABLET 81 MG: 81 TABLET CHEWABLE at 09:01

## 2023-10-10 SDOH — SOCIAL STABILITY: SOCIAL INSECURITY: DOES ANYONE TRY TO KEEP YOU FROM HAVING/CONTACTING OTHER FRIENDS OR DOING THINGS OUTSIDE YOUR HOME?: NO

## 2023-10-10 SDOH — SOCIAL STABILITY: SOCIAL INSECURITY: HAS ANYONE EVER THREATENED TO HURT YOUR FAMILY OR YOUR PETS?: NO

## 2023-10-10 SDOH — SOCIAL STABILITY: SOCIAL INSECURITY: ARE YOU OR HAVE YOU BEEN THREATENED OR ABUSED PHYSICALLY, EMOTIONALLY, OR SEXUALLY BY ANYONE?: NO

## 2023-10-10 SDOH — SOCIAL STABILITY: SOCIAL INSECURITY: HAVE YOU HAD THOUGHTS OF HARMING ANYONE ELSE?: NO

## 2023-10-10 SDOH — SOCIAL STABILITY: SOCIAL INSECURITY: WERE YOU ABLE TO COMPLETE ALL THE BEHAVIORAL HEALTH SCREENINGS?: YES

## 2023-10-10 SDOH — SOCIAL STABILITY: SOCIAL INSECURITY: DO YOU FEEL ANYONE HAS EXPLOITED OR TAKEN ADVANTAGE OF YOU FINANCIALLY OR OF YOUR PERSONAL PROPERTY?: NO

## 2023-10-10 SDOH — SOCIAL STABILITY: SOCIAL INSECURITY: ARE THERE ANY APPARENT SIGNS OF INJURIES/BEHAVIORS THAT COULD BE RELATED TO ABUSE/NEGLECT?: NO

## 2023-10-10 SDOH — SOCIAL STABILITY: SOCIAL INSECURITY: DO YOU FEEL UNSAFE GOING BACK TO THE PLACE WHERE YOU ARE LIVING?: NO

## 2023-10-10 SDOH — SOCIAL STABILITY: SOCIAL INSECURITY: ABUSE: ADULT

## 2023-10-10 ASSESSMENT — COGNITIVE AND FUNCTIONAL STATUS - GENERAL
MOBILITY SCORE: 12
MOVING TO AND FROM BED TO CHAIR: A LOT
EATING MEALS: A LITTLE
DRESSING REGULAR LOWER BODY CLOTHING: A LITTLE
CLIMB 3 TO 5 STEPS WITH RAILING: A LOT
MOVING FROM LYING ON BACK TO SITTING ON SIDE OF FLAT BED WITH BEDRAILS: A LOT
WALKING IN HOSPITAL ROOM: A LOT
STANDING UP FROM CHAIR USING ARMS: A LOT
PERSONAL GROOMING: A LITTLE
HELP NEEDED FOR BATHING: A LOT
DRESSING REGULAR UPPER BODY CLOTHING: A LITTLE
TURNING FROM BACK TO SIDE WHILE IN FLAT BAD: A LOT
DAILY ACTIVITIY SCORE: 16
TOILETING: A LOT

## 2023-10-10 ASSESSMENT — PATIENT HEALTH QUESTIONNAIRE - PHQ9
SUM OF ALL RESPONSES TO PHQ9 QUESTIONS 1 & 2: 0
1. LITTLE INTEREST OR PLEASURE IN DOING THINGS: NOT AT ALL
2. FEELING DOWN, DEPRESSED OR HOPELESS: NOT AT ALL

## 2023-10-10 ASSESSMENT — PAIN SCALES - GENERAL: PAINLEVEL_OUTOF10: 0 - NO PAIN

## 2023-10-10 ASSESSMENT — LIFESTYLE VARIABLES
SUBSTANCE_ABUSE_PAST_12_MONTHS: NO
HOW OFTEN DO YOU HAVE A DRINK CONTAINING ALCOHOL: NEVER
AUDIT-C TOTAL SCORE: 0
AUDIT-C TOTAL SCORE: 0
HOW OFTEN DO YOU HAVE 6 OR MORE DRINKS ON ONE OCCASION: NEVER
PRESCIPTION_ABUSE_PAST_12_MONTHS: NO
HOW MANY STANDARD DRINKS CONTAINING ALCOHOL DO YOU HAVE ON A TYPICAL DAY: PATIENT DOES NOT DRINK
SKIP TO QUESTIONS 9-10: 1

## 2023-10-10 ASSESSMENT — ACTIVITIES OF DAILY LIVING (ADL): HOME_MANAGEMENT_TIME_ENTRY: 12

## 2023-10-10 ASSESSMENT — PAIN - FUNCTIONAL ASSESSMENT: PAIN_FUNCTIONAL_ASSESSMENT: 0-10

## 2023-10-10 NOTE — CARE PLAN
The patient's goals for the shift include to go to nursing home.     The clinical goals for the shift include patinets mentation improving, will monitor pain    Over the shift, the patient did make progress toward the following goals. Barriers to progression include got up OOB to walk. Recommendations to address these barriers include keep walking.

## 2023-10-10 NOTE — CARE PLAN
Problem: Balance  Goal: Pt will demo good static/dynamic standing balance during ADL and functional activity with FWW for improved independence and participation in ADL   Outcome: Progressing     Problem: Dressings Lower Extremities  Goal: Patient to complete lower body dressing with FWW at mod I   Outcome: Progressing     Problem: Mobility  Goal: Pt will demo increased functional mobility to tolerate tasks necessary to complete ADL routine with FWW at mod I   Outcome: Progressing     Problem: Toileting  Goal: Patient will complete toileting tasks with FWW at mod I   Outcome: Progressing     Problem: Transfers  Goal: Pt will complete transfers with FWW at mod I   Outcome: Progressing

## 2023-10-10 NOTE — PROGRESS NOTES
"Occupational Therapy    Occupational Therapy Treatment    Name: Neha Carranza  MRN: [id number]  : 1940  Date: 10/10/23  Time Calculation  Start Time: 841  Stop Time: 853  Time Calculation (min): 12 min    Assessment:  OT Assessment: Pt demonstrating gradual improvement in OT goal areas limited by behavioral components related to dementia  Prognosis: Good  Evaluation/Treatment Tolerance: Patient tolerated treatment well  Medical Staff Made Aware: Yes (Pt left in chair with active alarm in reach at end of session. Discussed level of assist needed in transfers and mobility with nurse.)  Plan:  OT Frequency: 3 times per week  OT Discharge Recommendations: Moderate intensity level of continued care    Subjective   General:  OT Last Visit  OT Received On: 10/10/23  General  Reason for Referral: Impaired mobility. (B LE weakness. Fall)  Referred By: ZONIA Philip  Past Medical History Relevant to Rehab: Dementia, dizziness, falls  Missed Visit: Yes  Missed Visit Reason: Patient refused (Pt refused treatment despite repeated attempts verbalizing paranoid ideation and inisiting on immediate discharge to rehab. Pt reported \"not trusting anyone, and not liking all the games being played with her.\")  Family/Caregiver Present: Yes  Caregiver Feedback: Daughter (Patient lives alone but has caregivers stop by daily. Per patient's daughter patient becomes dizzy and has had multiple falls in the past few months.)  Prior to Session Communication: Bedside nurse, Care Coordinator  Patient Position Received: Alarm on, Up in chair  Preferred Learning Style: verbal, visual  General Comment: Pt receptive thouogh limiting and emotionally unstable limiting treatment perameters.  Vitals:     Pain Assessment:  Pain Assessment  Pain Assessment:  (denied pain)     Objective   Activities of Daily Living: Grooming  Grooming Level of Assistance: Setup, Close supervision  Grooming Where Assessed: Standing sinkside  Grooming Comments: Pt " demonstrated balance instability in standing at sink with use of sink for touch balance in course of task. Min verbal cues for sequencing and safety including reaching for objects outside of BRADY including paper towels.    LE Dressing  LE Dressing: Yes  Pants Level of Assistance: Minimum assistance  Sock Level of Assistance: Contact guard  LE Dressing Where Assessed: Chair  LE Dressing Comments: Increased time to complete following set-up.    Functional Standing Tolerance:  Functional Standing Tolerance  Time: ~1.5 mins  Activity: Clothing management/functional mobility/le dressing  Functional Standing Tolerance Comments: CGA assist for standing at sink in grooming task to correct posterior lean intermittently in task using FWW and sink intermittently for support in handwashing.  Bed Mobility/Transfers:      Transfer 1  Transfer to 1: Stand  Technique 1: Sit to stand  Transfer Device 1: Walker  Transfer Level of Assistance 1: Contact guard  Trials/Comments 1: Flexed posture and mod cues for safe transition of hands to/from device.  Transfers 2  Transfer From 2: Stand to, Chair with arms to  Technique 2: Stand to sit  Transfer Device 2: Walker  Transfer Level of Assistance 2: Contact guard, Moderate verbal cues     Outcome Measures:  [facility] Daily Activity  Putting on and taking off regular lower body clothing: A little  Bathing (including washing, rinsing, drying): A lot  Putting on and taking off regular upper body clothing: A little  Toileting, which includes using toilet, bedpan or urinal: A lot  Taking care of personal grooming such as brushing teeth: A little  Eating Meals: A little  Daily Activity - Total Score: 16      Education Documentation  Body Mechanics, taught by FLOR Sharma at 10/10/2023  9:39 AM.  Learner: Patient  Readiness: Acceptance  Method: Explanation, Demonstration  Response: Verbalizes Understanding, Needs Reinforcement  Comment: Pt supervision and serial repetition of instruction indicated  d/t dementia and cognitive deficets.    Home Exercise Program, taught by FLOR Sharma at 10/10/2023  9:39 AM.  Learner: Patient  Readiness: Acceptance  Method: Explanation, Demonstration  Response: Verbalizes Understanding, Needs Reinforcement  Comment: Pt supervision and serial repetition of instruction indicated d/t dementia and cognitive deficets.    Precautions, taught by FLOR Sharma at 10/10/2023  9:39 AM.  Learner: Patient  Readiness: Acceptance  Method: Explanation, Demonstration  Response: Verbalizes Understanding, Needs Reinforcement  Comment: Pt supervision and serial repetition of instruction indicated d/t dementia and cognitive deficets.    Mobility Training, taught by FLOR Sharma at 10/10/2023  9:39 AM.  Learner: Patient  Readiness: Acceptance  Method: Explanation, Demonstration  Response: Verbalizes Understanding, Needs Reinforcement  Comment: Pt supervision and serial repetition of instruction indicated d/t dementia and cognitive deficets.    Education Comments  Supervision, repetition, and reinforcement needed d/t dementia.      Goals:  Encounter Problems       Encounter Problems (Active)       Balance       Pt will demo good static/dynamic standing balance during ADL and functional activity with FWW for improved independence and participation in ADL  (Progressing)       Start:  10/05/23    Expected End:  10/19/23               Dressing Lower Extremities       Patient to complete lower body dressing with FWW at mod I  (Progressing)       Start:  10/05/23    Expected End:  10/19/23                 Mobility       Pt will demo increased functional mobility to tolerate tasks necessary to complete ADL routine with FWW at mod I  (Progressing)       Start:  10/05/23    Expected End:  10/19/23                 Toileting       Patient will complete toileting tasks with FWW at mod I  (Progressing)       Start:  10/05/23    Expected End:  10/19/23               Transfers       Pt will complete  transfers with FWW at mod I  (Progressing)       Start:  10/05/23    Expected End:  10/19/23

## 2023-10-10 NOTE — DISCHARGE SUMMARY
Discharge Diagnosis  Fall, initial encounter    Issues Requiring Follow-Up  - Stress testing  - Zio patch monitoring  - Workup of pulmonary nodules identified on imaging    Discharge Meds     Your medication list        CONTINUE taking these medications        Instructions Last Dose Given Next Dose Due   ASPIR-81 ORAL           losartan 25 mg tablet  Commonly known as: Cozaar                    Test Results Pending At Discharge  Pending Labs       No current pending labs.            Hospital Course  Brief HPI: Ms. Neha Carranza is an 83 year old female with PMHx of HTN and recurrent falls who presents to [facility] after suffering an unwitnessed fall at home with a down time of up to six hours.     ED Course:   - Vitals: Temp 37.4C (Tmax 38.1), HR 81bpm, SPO2 95%, RR 14, /96 mmHg  - Labs:  - CBC: WBC 11.7, Hb 14.2, Plt 192, Hct 41.9, ANC 8.83  - CMP: Glu 101, Na 134, K 4.3, Cl 99, HCO3 26, BUN 19, Cr .98, Ca 9.1,  , Mg 2.21  - Lactate: 1.1  - CK: 4,666  - Troponin: 911 => 723  - EKG: Rate 75, TX Interval 202ms, QRS 130ms, QT /491ms, NSR, LAD, Left BBB  - UA: 1+ Protein and 2+ blood. Otherwise unremarkable.  - Imaging:               CXR: Somewhat low lung volumes without focal consolidation, pleural effusion, or pneumothorax              Pelvis XRay: No evidence of acute trauma to the pelvis              CT Head: No acute intracranial hemorrhage or mass-effect. Left maxillar sinus disease.              CT C Spine: No acute cervical vertebral displacement or acute displaced fracture. Mild spondylolisthesis, moderate compression deformity of C7 and multilevel productive/degenerative changes similar to 12/21/22              CT PE: No acute pulmonary embolism to the segmental level  - Meds &Fluids: ASA 325mg, Tylenol 650mg, 1L NS bolus    Floor Course: Patient was admitted to floor for continued management. Monitored on tele. She was placed on maintenance fluid of LR @ 75 ml/hr with improvement of  her CK levels. Orthostatic vitals were negative. Cardiology was consulted to rule out cardiac causes precipitating fall. Carotid duplex US negative for significant stenosis of carotid arteries. TTE on 10/5 showed EF of 45-50%. Stress test offered to patient, however she ultimately chose to follow outpatient with primary cardiologist Dr. You for stress test and ziopatch. PT/OT evaluated, and pt discharged to SNF with the following recommendations:    Issues to address on follow up:  - Stress testing  - Zio patch monitoring  - Workup of pulmonary nodules identified on imaging    Follow Ups:  - Cardiology (Dr. You for stress test and ziopatch placement)  - Pulmonology (for follow up of incidental pulmonary nodules)  - PCP      Pertinent Physical Exam At Time of Discharge  Physical Exam  Constitutional:       General: She is not in acute distress.  HENT:      Head: Normocephalic and atraumatic.   Eyes:      Extraocular Movements: Extraocular movements intact.      Conjunctiva/sclera: Conjunctivae normal.      Pupils: Pupils are equal, round, and reactive to light.   Cardiovascular:      Rate and Rhythm: Normal rate and regular rhythm.      Pulses: Normal pulses.   Pulmonary:      Effort: Pulmonary effort is normal. No respiratory distress.      Breath sounds: Normal breath sounds. No wheezing, rhonchi or rales.   Abdominal:      General: Bowel sounds are normal. There is no distension.      Palpations: Abdomen is soft.      Tenderness: There is no abdominal tenderness. There is no guarding.   Musculoskeletal:         General: Normal range of motion.   Skin:     General: Skin is warm and dry.   Neurological:      General: No focal deficit present.      Mental Status: She is alert. Mental status is at baseline.      Comments: Oriented to person and time, not place. At baseline.   Psychiatric:         Mood and Affect: Mood normal.         Outpatient Follow-Up  Future Appointments   Date Time Provider  Department Center   12/8/2023  3:30 PM Larry Ballesteros MD [id number] Dairen Baldwin MD

## 2023-10-10 NOTE — PROGRESS NOTES
Physical Therapy                 Therapy Communication Note    Patient Name: Neha Carranza  MRN: [id number]  Today's Date: 10/10/2023     Discipline: Physical Therapy    Missed Visit Reason: Missed Visit Reason: Other (Comment) (pt sitting up in chair, preparing for DC at 1700 to SNF this date. Politely declining session in preparation for DC. No tx at 1513)    Missed Time: Attempt    Comment: Confirmed with RN at 1513

## 2023-10-12 ENCOUNTER — TELEPHONE (OUTPATIENT)
Dept: PRIMARY CARE | Facility: CLINIC | Age: 83
End: 2023-10-12
Payer: MEDICARE

## 2023-10-12 NOTE — TELEPHONE ENCOUNTER
Spoke with daughter Latonia as well as patient.  Her dementia certainly is worsening away from home which is not uncommon.  She is not able to tolerate medications.  Overall prognosis is poor if she does not do more with her balance issues dementia with therapy whether its rehab or going home 3 times a week with physical therapy.  I think she is going to need to get into assisted living with memory unit versus long-term care daughter is aware and is can start looking at these possibilities.

## 2023-10-12 NOTE — TELEPHONE ENCOUNTER
Patient was in hospital for 8 days, now in rehab, daughter Latonia states dementia worsening. Latonia would like to discuss this and the possibility of patient receiving PT at home so she can leave the facility. Call back # [phone]

## 2023-10-13 NOTE — DOCUMENTATION CLARIFICATION NOTE
"    PATIENT:               JAYLEEN REINOSO  ACCT #:                  [id number]  MRN:                       [id number]  :                       1940  ADMIT DATE:       10/3/2023 2:20 PM  DISCH DATE:        10/10/2023 8:55 PM  RESPONDING PROVIDER #:        [id number]          PROVIDER RESPONSE TEXT:    Demand ischemia in the setting of Traumatic Rhabdomyolysis    CDI QUERY TEXT:    [facility]_Abnormal Studies      Instruction:    Based on your assessment of the patient and the clinical information, please provide the requested documentation by clicking on the appropriate radio button and enter any additional information if prompted.    Question: Is there a diagnosis indicative of the lab values or image study      When answering this query, please exercise your independent professional judgment. The fact that a question is being asked, does not imply that any particular answer is desired or expected.    The patient's clinical indicators include:  Clinical Information: 83 y.o. female with a BMI of 23.68 presented with fall and was found on the floor with no memory of the incident.    Clinical Indicators:    Per ED note Durdella 10/3/23 \"Traumatic rhabdomyolysis\" and \"Elevated troponin\"    Per PN 10/4/23 Kaiwan \"Elevated Troponin\"  and \"Minimum c/f NSTEMI\"    Per PN 10/6/23 King \"Elevated troponin, suspect demand ischemia:    Per Cardiology Consult note Uday \"Non-MI troponin elevation 2/2 rhabdomyolysis, fall\"    Per Labs 10/3/23 through 10/10/23  Troponins 911, 723, and 633    Treatment: Cardiology consult,  Monitor on tele, repeat labs, EKG, and supportive care    Risk Factors: 84 y/o with noted traumatic rhabdomyolysis and with elevated troponins  Options provided:  -- NSTEMI type II in the setting of Traumatic Rhabdomyolysis  -- Non-ischemic myocardial injury in the setting of Traumatic Rhabdomyolysis  -- Demand ischemia in the setting of Traumatic Rhabdomyolysis  -- Other - I will add my own diagnosis  -- Refer " to Clinical Documentation Reviewer    Query created by: Armand Rausch on 10/10/2023 11:39 AM      Electronically signed by:  SHAHRAM ESPANA DO 10/13/2023 2:00 PM

## 2023-10-16 ENCOUNTER — DOCUMENTATION (OUTPATIENT)
Dept: PRIMARY CARE | Facility: CLINIC | Age: 83
End: 2023-10-16
Payer: MEDICARE

## 2023-10-16 ENCOUNTER — PATIENT OUTREACH (OUTPATIENT)
Dept: PRIMARY CARE | Facility: CLINIC | Age: 83
End: 2023-10-16
Payer: MEDICARE

## 2023-10-17 ENCOUNTER — PATIENT OUTREACH (OUTPATIENT)
Dept: PRIMARY CARE | Facility: CLINIC | Age: 83
End: 2023-10-17
Payer: MEDICARE

## 2023-10-17 ENCOUNTER — DOCUMENTATION (OUTPATIENT)
Dept: PRIMARY CARE | Facility: CLINIC | Age: 83
End: 2023-10-17
Payer: MEDICARE

## 2023-10-17 NOTE — PROGRESS NOTES
Discharge Facility: [facility]  Discharge Diagnosis: Syncope and collapse  Admission Date: 10/11/2023  Discharge Date: 10/13/2023    PCP Appointment Date: Tasked to office for scheduling  Specialist Appointment Date:  Neurology 12/22/2023  Hospital Encounter and Summary: Not available    See discharge assessment below for further details    Engagement  Call Start Time: 1030 (10/17/2023 10:35 AM)    Medications  Medications reviewed with patient/caregiver?: Yes (10/17/2023 10:35 AM)  Is the patient having any side effects they believe may be caused by any medication additions or changes?: No (10/17/2023 10:35 AM)  Does the patient have all medications ordered at discharge?: Not applicable (10/17/2023 10:35 AM)  Prescription Comments: Patient states no new scriots were given (10/17/2023 10:35 AM)  Is the patient taking all medications as directed (includes completed medication regime)?: Yes (10/17/2023 10:35 AM)  Medication Comments: Patient denies issues obtaining or affording medication (10/17/2023 10:35 AM)    Appointments  Care Management Interventions: Verified appointment date/time/provider (10/17/2023 10:35 AM)  Has the patient kept scheduled appointments due by today?: Yes (10/17/2023 10:35 AM)    Self Management  What is the home health agency?: N/A (10/17/2023 10:35 AM)  What Durable Medical Equipment (DME) was ordered?: N/A (10/17/2023 10:35 AM)    Patient Teaching  Does the patient have access to their discharge instructions?: Yes (10/17/2023 10:35 AM)  Care Management Interventions: Reviewed instructions with patient (10/17/2023 10:35 AM)  What is the patient's perception of their health status since discharge?: Improving (10/17/2023 10:35 AM)  Is the patient/caregiver able to teach back the hierarchy of who to call/visit for symptoms/problems? PCP, Specialist, Home Health nurse, Urgent Care, ED, 911: Yes (10/17/2023 10:35 AM)  Patient/Caregiver Education Comments: CM spoke to patient via phone.  She states that she is doing well at home. She has all needed medication and a good support system with her daughter. This CM was unable to schedule a pcp follow up within the 14 day window, tasked to office. (10/17/2023 10:35 AM)

## 2023-10-24 ENCOUNTER — TELEPHONE (OUTPATIENT)
Dept: PRIMARY CARE | Facility: CLINIC | Age: 83
End: 2023-10-24
Payer: MEDICARE

## 2023-10-24 DIAGNOSIS — R26.89 BALANCE DISORDER: ICD-10-CM

## 2023-10-24 DIAGNOSIS — R26.9 GAIT ABNORMALITY: ICD-10-CM

## 2023-10-24 NOTE — TELEPHONE ENCOUNTER
Majo, nurse from [facility], recommended patient have home PT and OT through [facility]. To go to [facility] Fax# [phone]

## 2023-10-26 ENCOUNTER — PATIENT OUTREACH (OUTPATIENT)
Dept: PRIMARY CARE | Facility: CLINIC | Age: 83
End: 2023-10-26
Payer: MEDICARE

## 2023-11-20 ENCOUNTER — OFFICE VISIT (OUTPATIENT)
Dept: PRIMARY CARE | Facility: CLINIC | Age: 83
End: 2023-11-20
Payer: MEDICARE

## 2023-11-20 VITALS
HEART RATE: 69 BPM | DIASTOLIC BLOOD PRESSURE: 72 MMHG | HEIGHT: 60 IN | TEMPERATURE: 97.6 F | SYSTOLIC BLOOD PRESSURE: 144 MMHG | WEIGHT: 124 LBS | BODY MASS INDEX: 24.35 KG/M2 | OXYGEN SATURATION: 99 %

## 2023-11-20 DIAGNOSIS — R73.01 IMPAIRED FASTING BLOOD SUGAR: ICD-10-CM

## 2023-11-20 DIAGNOSIS — R01.1 HEART MURMUR: ICD-10-CM

## 2023-11-20 DIAGNOSIS — N28.1 RENAL CYST: ICD-10-CM

## 2023-11-20 DIAGNOSIS — E55.9 VITAMIN D DEFICIENCY: ICD-10-CM

## 2023-11-20 DIAGNOSIS — Z86.73 HISTORY OF STROKE: ICD-10-CM

## 2023-11-20 DIAGNOSIS — M15.9 PRIMARY OSTEOARTHRITIS INVOLVING MULTIPLE JOINTS: ICD-10-CM

## 2023-11-20 DIAGNOSIS — E78.2 MIXED HYPERLIPIDEMIA: ICD-10-CM

## 2023-11-20 DIAGNOSIS — I10 BENIGN HYPERTENSION: Primary | ICD-10-CM

## 2023-11-20 DIAGNOSIS — Z85.51 HISTORY OF BLADDER CANCER: ICD-10-CM

## 2023-11-20 PROCEDURE — 3078F DIAST BP <80 MM HG: CPT | Performed by: INTERNAL MEDICINE

## 2023-11-20 PROCEDURE — 3077F SYST BP >= 140 MM HG: CPT | Performed by: INTERNAL MEDICINE

## 2023-11-20 PROCEDURE — 1126F AMNT PAIN NOTED NONE PRSNT: CPT | Performed by: INTERNAL MEDICINE

## 2023-11-20 PROCEDURE — 1036F TOBACCO NON-USER: CPT | Performed by: INTERNAL MEDICINE

## 2023-11-20 PROCEDURE — 1158F ADVNC CARE PLAN TLK DOCD: CPT | Performed by: INTERNAL MEDICINE

## 2023-11-20 PROCEDURE — 1159F MED LIST DOCD IN RCRD: CPT | Performed by: INTERNAL MEDICINE

## 2023-11-20 PROCEDURE — 99214 OFFICE O/P EST MOD 30 MIN: CPT | Performed by: INTERNAL MEDICINE

## 2023-11-20 RX ORDER — MEMANTINE HYDROCHLORIDE 5 MG/1
5 TABLET ORAL DAILY
COMMUNITY

## 2023-11-20 ASSESSMENT — ENCOUNTER SYMPTOMS
OCCASIONAL FEELINGS OF UNSTEADINESS: 1
LOSS OF SENSATION IN FEET: 0
DEPRESSION: 0

## 2023-11-20 ASSESSMENT — PATIENT HEALTH QUESTIONNAIRE - PHQ9
1. LITTLE INTEREST OR PLEASURE IN DOING THINGS: NOT AT ALL
SUM OF ALL RESPONSES TO PHQ9 QUESTIONS 1 AND 2: 0
2. FEELING DOWN, DEPRESSED OR HOPELESS: NOT AT ALL

## 2023-11-20 ASSESSMENT — PAIN SCALES - GENERAL: PAINLEVEL: 0-NO PAIN

## 2023-11-20 NOTE — PATIENT INSTRUCTIONS
Get labs 1 week before 6 month follow up.      Diagnoses and all orders for this visit:  Benign hypertension  Comments:  BP doing OK. Drink 48 ounces of water plus meal fluids.  Heart murmur  Mixed hyperlipidemia  Comments:  delines cholesterol medicaiton.  Orders:  -     Lipid Panel; Future  -     Comprehensive Metabolic Panel; Future  Vitamin D deficiency  Primary osteoarthritis involving multiple joints  History of bladder cancer  Comments:  2/24   History of stroke  Comments:  stable with asa 81 mg daily.  Renal cyst  Impaired fasting blood sugar  -     Hemoglobin A1C; Future  -     CBC; Future

## 2023-11-20 NOTE — PROGRESS NOTES
[facility]: [facility]  PROGRESS NOTE      Neha Carranza is a 83 y.o. female that is presenting today for DJD 6 mo fu.    Assessment/Plan   Diagnoses and all orders for this visit:  Benign hypertension  Comments:  BP doing OK. Drink 48 ounces of water plus meal fluids.  Heart murmur  Mixed hyperlipidemia  Comments:  delines cholesterol medicaiton.  Orders:  -     Lipid Panel; Future  -     Comprehensive Metabolic Panel; Future  Vitamin D deficiency  Primary osteoarthritis involving multiple joints  History of bladder cancer  Comments:  2/24   History of stroke  Comments:  stable with asa 81 mg daily.  Renal cyst  Impaired fasting blood sugar  -     Hemoglobin A1C; Future  -     CBC; Future    Subjective   Hx CVA, SZ disorder, stable, DJD , walker. Care aids at home 8 hrs per week. Walker no falls, hydration.    Review of Systems   Objective   Vitals:    11/20/23 1108   BP: 144/72   Pulse: 69   Temp: 36.4 °C (97.6 °F)   SpO2: 99%      Body mass index is 24.22 kg/m².  Physical Exam  Constitutional:       General: She is not in acute distress.     Comments: walker   HENT:      Head: Normocephalic and atraumatic.      Right Ear: Tympanic membrane normal.      Left Ear: Tympanic membrane normal.      Mouth/Throat:      Mouth: Mucous membranes are moist.      Pharynx: Oropharynx is clear.   Eyes:      Extraocular Movements: Extraocular movements intact.      Conjunctiva/sclera: Conjunctivae normal.      Pupils: Pupils are equal, round, and reactive to light.   Cardiovascular:      Rate and Rhythm: Normal rate and regular rhythm.      Heart sounds: Murmur (1/6 DUNCAN) heard.   Pulmonary:      Breath sounds: Normal breath sounds.   Abdominal:      General: Bowel sounds are normal.      Palpations: Abdomen is soft. There is no mass.   Musculoskeletal:         General: Normal range of motion.      Cervical back: Neck supple. No tenderness.      Right lower leg: Edema (trace) present.  "     Left lower leg: Edema (trace) present.   Skin:     General: Skin is warm and dry.   Neurological:      General: No focal deficit present.      Mental Status: She is oriented to person, place, and time.     Diagnostic Results   Lab Results   Component Value Date    GLUCOSE 115 (H) 10/10/2023    CALCIUM 8.9 10/10/2023     10/10/2023    K 3.9 10/10/2023    CO2 26 10/10/2023     10/10/2023    BUN 19 10/10/2023    CREATININE 1.14 (H) 10/10/2023     Lab Results   Component Value Date    ALT 39 10/04/2023    AST 97 (H) 10/04/2023    ALKPHOS 58 10/04/2023    BILITOT 0.5 10/04/2023     Lab Results   Component Value Date    WBC 8.7 10/10/2023    HGB 14.1 10/10/2023    HCT 43.5 10/10/2023     10/10/2023     10/10/2023     No results found for: \"CHOL\"  No results found for: \"HDL\"  No results found for: \"LDLCALC\"  No results found for: \"TRIG\"  No components found for: \"CHOLHDL\"  Lab Results   Component Value Date    HGBA1C 5.2 04/08/2022     Other labs not included in the list above were reviewed either before or during this encounter.    History    Past Medical History:   Diagnosis Date   • Benign hypertension 09/02/2023   • Bladder cancer (CMS/HCC) 09/02/2023   • Degenerative joint disease 09/02/2023   • Encounter for screening mammogram for malignant neoplasm of breast     Visit for screening mammogram   • Estrogen deficiency     DEXA 5/21 back nl, hip neck T-2.3 had 5 years fosamax   • Heart murmur 09/02/2023 2/21echo EF 55-60% mild-mod MR aortic valve OK.   • History of stroke 11/20/2023   • Hyperlipidemia 09/02/2023   • MCI (mild cognitive impairment)     4/18 declines meds   • Mitral valve regurgitation 09/02/2023   • Other conditions influencing health status     Bladder Cancer   • Personal history of other diseases of the circulatory system     History of hypertension   • Personal history of other diseases of the musculoskeletal system and connective tissue     History of osteopenia   • " Personal history of other specified conditions     History of blood loss   • Renal cyst 09/02/2023    Large left drained   • Vitamin D deficiency 09/02/2023     Past Surgical History:   Procedure Laterality Date   • COLONOSCOPY  07/12/2013    Complete Colonoscopy   • OTHER SURGICAL HISTORY  07/12/2013    Stress Test ECG Performed   • US GUIDED ABSCESS DRAIN  8/12/2022    US GUIDED ABSCESS DRAIN [facility]     Family History   Problem Relation Name Age of Onset   • Heart disease Mother     • Heart failure Mother     • Heart disease Father     • Heart failure Father     • Parkinsonism Sister       Social History     Socioeconomic History   • Marital status:      Spouse name: Not on file   • Number of children: Not on file   • Years of education: Not on file   • Highest education level: Not on file   Occupational History   • Not on file   Tobacco Use   • Smoking status: Never     Passive exposure: Never   • Smokeless tobacco: Never   Vaping Use   • Vaping Use: Never used   Substance and Sexual Activity   • Alcohol use: Not Currently   • Drug use: Never   • Sexual activity: Not on file   Other Topics Concern   • Not on file   Social History Narrative   • Not on file     Social Determinants of Health     Financial Resource Strain: Not on file   Food Insecurity: Not on file   Transportation Needs: Not on file   Physical Activity: Not on file   Stress: Not on file   Social Connections: Not on file   Intimate Partner Violence: Not on file   Housing Stability: Not on file     Allergies   Allergen Reactions   • Tramadol Other     Vomiting   • Adhesive Tape-Silicones Other   • Ciprofloxacin Rash   • Esomeprazole Rash     Current Outpatient Medications on File Prior to Visit   Medication Sig Dispense Refill   • aspirin (ASPIR-81 ORAL) Take 1 tablet by mouth once daily at bedtime.     • losartan (Cozaar) 25 mg tablet Take 1 tablet (25 mg) by mouth once daily at bedtime.     • memantine (Namenda) 5 mg tablet Take  1 tablet (5 mg) by mouth once daily.       No current facility-administered medications on file prior to visit.     Immunization History   Administered Date(s) Administered   • Flu vaccine, quadrivalent, high-dose, preservative free, age 65y+ (FLUZONE) 10/01/2020, 12/03/2021, 11/11/2022   • Influenza, High Dose Seasonal, Preservative Free 09/11/2014, 10/18/2016, 10/03/2017, 10/12/2018   • Influenza, Seasonal, Quadrivalent, Adjuvanted 10/09/2020   • Influenza, Unspecified 09/18/2013   • Influenza, seasonal, injectable 11/09/2012, 10/01/2013, 10/14/2015   • Influenza, trivalent, adjuvanted 10/11/2019   • Moderna COVID-19 vaccine, bivalent, blue cap/gray label *Check age/dose* 10/05/2022   • Moderna SARS-CoV-2 Vaccination 01/28/2021, 02/25/2021, 04/21/2022   • PPD Test 03/26/2001   • Pneumococcal conjugate vaccine, 13-valent (PREVNAR 13) 04/21/2016   • Pneumococcal polysaccharide vaccine, 23-valent, age 2 years and older (PNEUMOVAX 23) 05/19/2017   • Td (adult), unspecified 08/19/2000   • Tdap vaccine, age 7 year and older (BOOSTRIX) 07/17/2014   • Zoster, live 01/01/2007, 08/29/2007     Patient's medical history was reviewed and updated either before or during this encounter.       Larry Ballesteros MD

## 2024-01-09 ENCOUNTER — TELEPHONE (OUTPATIENT)
Dept: PRIMARY CARE | Facility: CLINIC | Age: 84
End: 2024-01-09
Payer: MEDICARE

## 2024-04-03 PROCEDURE — 88307 TISSUE EXAM BY PATHOLOGIST: CPT | Performed by: PATHOLOGY

## 2024-04-03 PROCEDURE — 88307 TISSUE EXAM BY PATHOLOGIST: CPT

## 2024-04-04 ENCOUNTER — LAB REQUISITION (OUTPATIENT)
Dept: LAB | Facility: HOSPITAL | Age: 84
End: 2024-04-04
Payer: MEDICARE

## 2024-04-04 DIAGNOSIS — C67.8 MALIGNANT NEOPLASM OF OVERLAPPING SITES OF BLADDER (MULTI): ICD-10-CM

## 2024-04-08 LAB
LABORATORY COMMENT REPORT: NORMAL
PATH REPORT.FINAL DX SPEC: NORMAL
PATH REPORT.GROSS SPEC: NORMAL
PATH REPORT.RELEVANT HX SPEC: NORMAL
PATH REPORT.TOTAL CANCER: NORMAL

## 2024-06-24 ENCOUNTER — APPOINTMENT (OUTPATIENT)
Dept: PRIMARY CARE | Facility: CLINIC | Age: 84
End: 2024-06-24
Payer: MEDICARE

## 2024-06-28 ENCOUNTER — APPOINTMENT (OUTPATIENT)
Dept: PRIMARY CARE | Facility: CLINIC | Age: 84
End: 2024-06-28
Payer: MEDICARE

## 2025-01-29 ENCOUNTER — HOSPITAL ENCOUNTER (OUTPATIENT)
Facility: HOSPITAL | Age: 85
Setting detail: OBSERVATION
Discharge: NURSING, REHAB OR LONG-TERM CARE | End: 2025-02-01
Attending: STUDENT IN AN ORGANIZED HEALTH CARE EDUCATION/TRAINING PROGRAM | Admitting: INTERNAL MEDICINE
Payer: MEDICARE

## 2025-01-29 ENCOUNTER — APPOINTMENT (OUTPATIENT)
Dept: RADIOLOGY | Facility: HOSPITAL | Age: 85
End: 2025-01-29
Payer: MEDICARE

## 2025-01-29 ENCOUNTER — APPOINTMENT (OUTPATIENT)
Dept: CARDIOLOGY | Facility: HOSPITAL | Age: 85
End: 2025-01-29
Payer: MEDICARE

## 2025-01-29 DIAGNOSIS — I10 BENIGN HYPERTENSION: ICD-10-CM
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
## 2025-01-29 DIAGNOSIS — R42 DIZZY: Primary | ICD-10-CM

## 2025-01-29 DIAGNOSIS — S22.42XA CLOSED FRACTURE OF MULTIPLE RIBS OF LEFT SIDE, INITIAL ENCOUNTER: ICD-10-CM

## 2025-01-29 LAB
ALBUMIN SERPL BCP-MCNC: 4 G/DL (ref 3.4–5)
ALP SERPL-CCNC: 55 U/L (ref 33–136)
ALT SERPL W P-5'-P-CCNC: 21 U/L (ref 7–45)
ANION GAP SERPL CALC-SCNC: 14 MMOL/L (ref 10–20)
AST SERPL W P-5'-P-CCNC: 24 U/L (ref 9–39)
BASOPHILS # BLD AUTO: 0.05 X10*3/UL (ref 0–0.1)
BASOPHILS NFR BLD AUTO: 0.5 %
BILIRUB SERPL-MCNC: 0.4 MG/DL (ref 0–1.2)
BUN SERPL-MCNC: 26 MG/DL (ref 6–23)
CALCIUM SERPL-MCNC: 9.1 MG/DL (ref 8.6–10.3)
CARDIAC TROPONIN I PNL SERPL HS: 11 NG/L (ref 0–13)
CHLORIDE SERPL-SCNC: 102 MMOL/L (ref 98–107)
CO2 SERPL-SCNC: 23 MMOL/L (ref 21–32)
CREAT SERPL-MCNC: 0.98 MG/DL (ref 0.5–1.05)
EGFRCR SERPLBLD CKD-EPI 2021: 57 ML/MIN/1.73M*2
EOSINOPHIL # BLD AUTO: 0.28 X10*3/UL (ref 0–0.4)
EOSINOPHIL NFR BLD AUTO: 3.1 %
ERYTHROCYTE [DISTWIDTH] IN BLOOD BY AUTOMATED COUNT: 12.4 % (ref 11.5–14.5)
GLUCOSE SERPL-MCNC: 142 MG/DL (ref 74–99)
HCT VFR BLD AUTO: 42.5 % (ref 36–46)
HGB BLD-MCNC: 14.6 G/DL (ref 12–16)
IMM GRANULOCYTES # BLD AUTO: 0.03 X10*3/UL (ref 0–0.5)
IMM GRANULOCYTES NFR BLD AUTO: 0.3 % (ref 0–0.9)
INR PPP: 1 (ref 0.9–1.1)
LYMPHOCYTES # BLD AUTO: 1.62 X10*3/UL (ref 0.8–3)
LYMPHOCYTES NFR BLD AUTO: 17.6 %
MCH RBC QN AUTO: 33.3 PG (ref 26–34)
MCHC RBC AUTO-ENTMCNC: 34.4 G/DL (ref 32–36)
MCV RBC AUTO: 97 FL (ref 80–100)
MONOCYTES # BLD AUTO: 0.86 X10*3/UL (ref 0.05–0.8)
MONOCYTES NFR BLD AUTO: 9.4 %
NEUTROPHILS # BLD AUTO: 6.34 X10*3/UL (ref 1.6–5.5)
NEUTROPHILS NFR BLD AUTO: 69.1 %
NRBC BLD-RTO: 0 /100 WBCS (ref 0–0)
PLATELET # BLD AUTO: 215 X10*3/UL (ref 150–450)
POTASSIUM SERPL-SCNC: 4.4 MMOL/L (ref 3.5–5.3)
PROT SERPL-MCNC: 6.9 G/DL (ref 6.4–8.2)
PROTHROMBIN TIME: 10.8 SECONDS (ref 9.8–12.8)
RBC # BLD AUTO: 4.39 X10*6/UL (ref 4–5.2)
SODIUM SERPL-SCNC: 135 MMOL/L (ref 136–145)
WBC # BLD AUTO: 9.2 X10*3/UL (ref 4.4–11.3)

## 2025-01-29 PROCEDURE — 99285 EMERGENCY DEPT VISIT HI MDM: CPT | Mod: 25 | Performed by: STUDENT IN AN ORGANIZED HEALTH CARE EDUCATION/TRAINING PROGRAM

## 2025-01-29 PROCEDURE — 85610 PROTHROMBIN TIME: CPT | Performed by: STUDENT IN AN ORGANIZED HEALTH CARE EDUCATION/TRAINING PROGRAM

## 2025-01-29 PROCEDURE — 72125 CT NECK SPINE W/O DYE: CPT

## 2025-01-29 PROCEDURE — 72128 CT CHEST SPINE W/O DYE: CPT | Mod: RCN

## 2025-01-29 PROCEDURE — 80053 COMPREHEN METABOLIC PANEL: CPT | Performed by: STUDENT IN AN ORGANIZED HEALTH CARE EDUCATION/TRAINING PROGRAM

## 2025-01-29 PROCEDURE — 73552 X-RAY EXAM OF FEMUR 2/>: CPT | Mod: LT

## 2025-01-29 PROCEDURE — 2500000005 HC RX 250 GENERAL PHARMACY W/O HCPCS: Performed by: STUDENT IN AN ORGANIZED HEALTH CARE EDUCATION/TRAINING PROGRAM

## 2025-01-29 PROCEDURE — 70450 CT HEAD/BRAIN W/O DYE: CPT | Performed by: RADIOLOGY

## 2025-01-29 PROCEDURE — 36415 COLL VENOUS BLD VENIPUNCTURE: CPT | Performed by: STUDENT IN AN ORGANIZED HEALTH CARE EDUCATION/TRAINING PROGRAM

## 2025-01-29 PROCEDURE — 74177 CT ABD & PELVIS W/CONTRAST: CPT

## 2025-01-29 PROCEDURE — 70450 CT HEAD/BRAIN W/O DYE: CPT

## 2025-01-29 PROCEDURE — 72125 CT NECK SPINE W/O DYE: CPT | Performed by: RADIOLOGY

## 2025-01-29 PROCEDURE — 73552 X-RAY EXAM OF FEMUR 2/>: CPT | Mod: LEFT SIDE | Performed by: RADIOLOGY

## 2025-01-29 PROCEDURE — 85025 COMPLETE CBC W/AUTO DIFF WBC: CPT | Performed by: STUDENT IN AN ORGANIZED HEALTH CARE EDUCATION/TRAINING PROGRAM

## 2025-01-29 PROCEDURE — 2550000001 HC RX 255 CONTRASTS: Performed by: STUDENT IN AN ORGANIZED HEALTH CARE EDUCATION/TRAINING PROGRAM

## 2025-01-29 PROCEDURE — 93005 ELECTROCARDIOGRAM TRACING: CPT

## 2025-01-29 PROCEDURE — 84484 ASSAY OF TROPONIN QUANT: CPT | Performed by: STUDENT IN AN ORGANIZED HEALTH CARE EDUCATION/TRAINING PROGRAM

## 2025-01-29 PROCEDURE — 2500000001 HC RX 250 WO HCPCS SELF ADMINISTERED DRUGS (ALT 637 FOR MEDICARE OP): Performed by: STUDENT IN AN ORGANIZED HEALTH CARE EDUCATION/TRAINING PROGRAM

## 2025-01-29 PROCEDURE — 72131 CT LUMBAR SPINE W/O DYE: CPT | Mod: RCN

## 2025-01-29 RX ORDER — OXYCODONE HYDROCHLORIDE 5 MG/1
5 TABLET ORAL ONCE
Status: COMPLETED | OUTPATIENT
Start: 2025-01-29 | End: 2025-01-29

## 2025-01-29 RX ORDER — IBUPROFEN 600 MG/1
600 TABLET ORAL ONCE
Status: COMPLETED | OUTPATIENT
Start: 2025-01-29 | End: 2025-01-29

## 2025-01-29 RX ORDER — LIDOCAINE 560 MG/1
1 PATCH PERCUTANEOUS; TOPICAL; TRANSDERMAL DAILY
Status: DISCONTINUED | OUTPATIENT
Start: 2025-01-29 | End: 2025-02-01 | Stop reason: HOSPADM

## 2025-01-29 RX ADMIN — IOHEXOL 75 ML: 350 INJECTION, SOLUTION INTRAVENOUS at 22:39

## 2025-01-29 RX ADMIN — IBUPROFEN 600 MG: 600 TABLET, FILM COATED ORAL at 21:45

## 2025-01-29 RX ADMIN — LIDOCAINE 1 PATCH: 4 PATCH TOPICAL at 21:46

## 2025-01-29 RX ADMIN — OXYCODONE HYDROCHLORIDE 5 MG: 5 TABLET ORAL at 22:10

## 2025-01-29 ASSESSMENT — COLUMBIA-SUICIDE SEVERITY RATING SCALE - C-SSRS
2. HAVE YOU ACTUALLY HAD ANY THOUGHTS OF KILLING YOURSELF?: NO
6. HAVE YOU EVER DONE ANYTHING, STARTED TO DO ANYTHING, OR PREPARED TO DO ANYTHING TO END YOUR LIFE?: NO
1. IN THE PAST MONTH, HAVE YOU WISHED YOU WERE DEAD OR WISHED YOU COULD GO TO SLEEP AND NOT WAKE UP?: NO

## 2025-01-29 ASSESSMENT — PAIN DESCRIPTION - ORIENTATION
ORIENTATION: LEFT
ORIENTATION: LEFT

## 2025-01-29 ASSESSMENT — PAIN DESCRIPTION - LOCATION
LOCATION: RIB CAGE
LOCATION: RIB CAGE

## 2025-01-29 ASSESSMENT — PAIN SCALES - GENERAL
PAINLEVEL_OUTOF10: 9
PAINLEVEL_OUTOF10: 10 - WORST POSSIBLE PAIN

## 2025-01-29 ASSESSMENT — PAIN - FUNCTIONAL ASSESSMENT: PAIN_FUNCTIONAL_ASSESSMENT: 0-10

## 2025-01-30 PROBLEM — R42 DIZZY: Status: ACTIVE | Noted: 2025-01-30

## 2025-01-30 LAB
ALBUMIN SERPL BCP-MCNC: 3.4 G/DL (ref 3.4–5)
ANION GAP SERPL CALC-SCNC: 10 MMOL/L (ref 10–20)
ATRIAL RATE: 82 BPM
BUN SERPL-MCNC: 18 MG/DL (ref 6–23)
CALCIUM SERPL-MCNC: 8.4 MG/DL (ref 8.6–10.3)
CARDIAC TROPONIN I PNL SERPL HS: 12 NG/L (ref 0–13)
CHLORIDE SERPL-SCNC: 104 MMOL/L (ref 98–107)
CO2 SERPL-SCNC: 26 MMOL/L (ref 21–32)
CREAT SERPL-MCNC: 0.95 MG/DL (ref 0.5–1.05)
EGFRCR SERPLBLD CKD-EPI 2021: 59 ML/MIN/1.73M*2
ERYTHROCYTE [DISTWIDTH] IN BLOOD BY AUTOMATED COUNT: 12.5 % (ref 11.5–14.5)
GLUCOSE SERPL-MCNC: 93 MG/DL (ref 74–99)
HCT VFR BLD AUTO: 38.1 % (ref 36–46)
HGB BLD-MCNC: 12.9 G/DL (ref 12–16)
MAGNESIUM SERPL-MCNC: 1.95 MG/DL (ref 1.6–2.4)
MCH RBC QN AUTO: 33.1 PG (ref 26–34)
MCHC RBC AUTO-ENTMCNC: 33.9 G/DL (ref 32–36)
MCV RBC AUTO: 98 FL (ref 80–100)
NRBC BLD-RTO: 0 /100 WBCS (ref 0–0)
P AXIS: 44 DEGREES
P OFFSET: 171 MS
P ONSET: 120 MS
PHOSPHATE SERPL-MCNC: 3 MG/DL (ref 2.5–4.9)
PLATELET # BLD AUTO: 180 X10*3/UL (ref 150–450)
POTASSIUM SERPL-SCNC: 3.8 MMOL/L (ref 3.5–5.3)
PR INTERVAL: 198 MS
Q ONSET: 219 MS
QRS COUNT: 14 BEATS
QRS DURATION: 132 MS
QT INTERVAL: 440 MS
QTC CALCULATION(BAZETT): 514 MS
QTC FREDERICIA: 488 MS
R AXIS: -59 DEGREES
RBC # BLD AUTO: 3.9 X10*6/UL (ref 4–5.2)
SODIUM SERPL-SCNC: 136 MMOL/L (ref 136–145)
T AXIS: 118 DEGREES
T OFFSET: 439 MS
VENTRICULAR RATE: 82 BPM
WBC # BLD AUTO: 7.4 X10*3/UL (ref 4.4–11.3)

## 2025-01-30 PROCEDURE — 2500000001 HC RX 250 WO HCPCS SELF ADMINISTERED DRUGS (ALT 637 FOR MEDICARE OP)

## 2025-01-30 PROCEDURE — 85027 COMPLETE CBC AUTOMATED: CPT | Performed by: BEHAVIOR TECHNICIAN

## 2025-01-30 PROCEDURE — 2500000001 HC RX 250 WO HCPCS SELF ADMINISTERED DRUGS (ALT 637 FOR MEDICARE OP): Performed by: BEHAVIOR TECHNICIAN

## 2025-01-30 PROCEDURE — 2500000004 HC RX 250 GENERAL PHARMACY W/ HCPCS (ALT 636 FOR OP/ED)

## 2025-01-30 PROCEDURE — 96361 HYDRATE IV INFUSION ADD-ON: CPT

## 2025-01-30 PROCEDURE — 36415 COLL VENOUS BLD VENIPUNCTURE: CPT | Performed by: BEHAVIOR TECHNICIAN

## 2025-01-30 PROCEDURE — 84100 ASSAY OF PHOSPHORUS: CPT | Performed by: BEHAVIOR TECHNICIAN

## 2025-01-30 PROCEDURE — 36415 COLL VENOUS BLD VENIPUNCTURE: CPT | Performed by: STUDENT IN AN ORGANIZED HEALTH CARE EDUCATION/TRAINING PROGRAM

## 2025-01-30 PROCEDURE — 94760 N-INVAS EAR/PLS OXIMETRY 1: CPT

## 2025-01-30 PROCEDURE — G0378 HOSPITAL OBSERVATION PER HR: HCPCS

## 2025-01-30 PROCEDURE — 99222 1ST HOSP IP/OBS MODERATE 55: CPT

## 2025-01-30 PROCEDURE — 84484 ASSAY OF TROPONIN QUANT: CPT | Performed by: STUDENT IN AN ORGANIZED HEALTH CARE EDUCATION/TRAINING PROGRAM

## 2025-01-30 PROCEDURE — 2500000002 HC RX 250 W HCPCS SELF ADMINISTERED DRUGS (ALT 637 FOR MEDICARE OP, ALT 636 FOR OP/ED): Performed by: BEHAVIOR TECHNICIAN

## 2025-01-30 PROCEDURE — 83735 ASSAY OF MAGNESIUM: CPT | Performed by: BEHAVIOR TECHNICIAN

## 2025-01-30 PROCEDURE — 96374 THER/PROPH/DIAG INJ IV PUSH: CPT

## 2025-01-30 RX ORDER — AMOXICILLIN 250 MG
1 CAPSULE ORAL NIGHTLY
Status: DISCONTINUED | OUTPATIENT
Start: 2025-01-30 | End: 2025-02-01 | Stop reason: HOSPADM

## 2025-01-30 RX ORDER — HYDROXYZINE HYDROCHLORIDE 25 MG/1
25 TABLET, FILM COATED ORAL ONCE
Status: DISCONTINUED | OUTPATIENT
Start: 2025-01-30 | End: 2025-01-30

## 2025-01-30 RX ORDER — ACETAMINOPHEN 325 MG/1
650 TABLET ORAL EVERY 4 HOURS PRN
Status: DISCONTINUED | OUTPATIENT
Start: 2025-01-30 | End: 2025-01-31

## 2025-01-30 RX ORDER — OXYCODONE HYDROCHLORIDE 5 MG/1
10 TABLET ORAL EVERY 6 HOURS PRN
Status: DISCONTINUED | OUTPATIENT
Start: 2025-01-30 | End: 2025-02-01 | Stop reason: HOSPADM

## 2025-01-30 RX ORDER — MECLIZINE HYDROCHLORIDE 25 MG/1
25 TABLET ORAL 3 TIMES DAILY PRN
COMMUNITY

## 2025-01-30 RX ORDER — LOSARTAN POTASSIUM 50 MG/1
50 TABLET ORAL DAILY
Status: DISCONTINUED | OUTPATIENT
Start: 2025-01-30 | End: 2025-02-01 | Stop reason: HOSPADM

## 2025-01-30 RX ORDER — MEMANTINE HYDROCHLORIDE 10 MG/1
10 TABLET ORAL DAILY
Status: DISCONTINUED | OUTPATIENT
Start: 2025-01-30 | End: 2025-02-01 | Stop reason: HOSPADM

## 2025-01-30 RX ORDER — POLYETHYLENE GLYCOL 3350 17 G/17G
17 POWDER, FOR SOLUTION ORAL DAILY
Status: DISCONTINUED | OUTPATIENT
Start: 2025-01-30 | End: 2025-02-01 | Stop reason: HOSPADM

## 2025-01-30 RX ORDER — OXYCODONE HYDROCHLORIDE 5 MG/1
5 TABLET ORAL EVERY 6 HOURS PRN
Status: DISCONTINUED | OUTPATIENT
Start: 2025-01-30 | End: 2025-02-01 | Stop reason: HOSPADM

## 2025-01-30 RX ORDER — ENOXAPARIN SODIUM 100 MG/ML
40 INJECTION SUBCUTANEOUS EVERY 24 HOURS
Status: DISCONTINUED | OUTPATIENT
Start: 2025-01-30 | End: 2025-02-01 | Stop reason: HOSPADM

## 2025-01-30 RX ORDER — MECLIZINE HYDROCHLORIDE 25 MG/1
25 TABLET ORAL 3 TIMES DAILY PRN
Status: DISCONTINUED | OUTPATIENT
Start: 2025-01-30 | End: 2025-02-01 | Stop reason: HOSPADM

## 2025-01-30 RX ORDER — NAPROXEN SODIUM 220 MG/1
81 TABLET, FILM COATED ORAL DAILY
Status: DISCONTINUED | OUTPATIENT
Start: 2025-01-30 | End: 2025-02-01 | Stop reason: HOSPADM

## 2025-01-30 RX ADMIN — MECLIZINE HYDROCHLORIDE 25 MG: 25 TABLET ORAL at 17:59

## 2025-01-30 RX ADMIN — LOSARTAN POTASSIUM 50 MG: 50 TABLET, FILM COATED ORAL at 17:59

## 2025-01-30 RX ADMIN — OXYCODONE HYDROCHLORIDE 5 MG: 5 TABLET ORAL at 06:41

## 2025-01-30 RX ADMIN — ACETAMINOPHEN 650 MG: 325 TABLET ORAL at 17:59

## 2025-01-30 RX ADMIN — ASPIRIN 81 MG: 81 TABLET, CHEWABLE ORAL at 17:59

## 2025-01-30 RX ADMIN — SODIUM CHLORIDE, POTASSIUM CHLORIDE, SODIUM LACTATE AND CALCIUM CHLORIDE 1000 ML: 600; 310; 30; 20 INJECTION, SOLUTION INTRAVENOUS at 06:41

## 2025-01-30 RX ADMIN — MEMANTINE 10 MG: 10 TABLET ORAL at 18:05

## 2025-01-30 RX ADMIN — HYDROMORPHONE HYDROCHLORIDE 0.2 MG: 1 INJECTION, SOLUTION INTRAMUSCULAR; INTRAVENOUS; SUBCUTANEOUS at 11:42

## 2025-01-30 SDOH — ECONOMIC STABILITY: TRANSPORTATION INSECURITY
IN THE PAST 12 MONTHS, HAS LACK OF TRANSPORTATION KEPT YOU FROM MEDICAL APPOINTMENTS OR FROM GETTING MEDICATIONS?: PATIENT UNABLE TO ANSWER

## 2025-01-30 SDOH — SOCIAL STABILITY: SOCIAL INSECURITY
WITHIN THE LAST YEAR, HAVE YOU BEEN HUMILIATED OR EMOTIONALLY ABUSED IN OTHER WAYS BY YOUR PARTNER OR EX-PARTNER?: PATIENT UNABLE TO ANSWER

## 2025-01-30 SDOH — ECONOMIC STABILITY: HOUSING INSECURITY
IN THE LAST 12 MONTHS, WAS THERE A TIME WHEN YOU WERE NOT ABLE TO PAY THE MORTGAGE OR RENT ON TIME?: PATIENT UNABLE TO ANSWER

## 2025-01-30 SDOH — SOCIAL STABILITY: SOCIAL INSECURITY: WERE YOU ABLE TO COMPLETE ALL THE BEHAVIORAL HEALTH SCREENINGS?: NO

## 2025-01-30 SDOH — SOCIAL STABILITY: SOCIAL INSECURITY: DO YOU FEEL UNSAFE GOING BACK TO THE PLACE WHERE YOU ARE LIVING?: UNABLE TO ASSESS

## 2025-01-30 SDOH — ECONOMIC STABILITY: FOOD INSECURITY
HOW HARD IS IT FOR YOU TO PAY FOR THE VERY BASICS LIKE FOOD, HOUSING, MEDICAL CARE, AND HEATING?: PATIENT UNABLE TO ANSWER

## 2025-01-30 SDOH — SOCIAL STABILITY: SOCIAL INSECURITY: DO YOU FEEL ANYONE HAS EXPLOITED OR TAKEN ADVANTAGE OF YOU FINANCIALLY OR OF YOUR PERSONAL PROPERTY?: UNABLE TO ASSESS

## 2025-01-30 SDOH — SOCIAL STABILITY: SOCIAL INSECURITY: ARE THERE ANY APPARENT SIGNS OF INJURIES/BEHAVIORS THAT COULD BE RELATED TO ABUSE/NEGLECT?: UNABLE TO ASSESS

## 2025-01-30 SDOH — ECONOMIC STABILITY: FOOD INSECURITY
WITHIN THE PAST 12 MONTHS, THE FOOD YOU BOUGHT JUST DIDN'T LAST AND YOU DIDN'T HAVE MONEY TO GET MORE.: PATIENT UNABLE TO ANSWER

## 2025-01-30 SDOH — SOCIAL STABILITY: SOCIAL INSECURITY: DOES ANYONE TRY TO KEEP YOU FROM HAVING/CONTACTING OTHER FRIENDS OR DOING THINGS OUTSIDE YOUR HOME?: UNABLE TO ASSESS

## 2025-01-30 SDOH — SOCIAL STABILITY: SOCIAL INSECURITY
WITHIN THE LAST YEAR, HAVE YOU BEEN KICKED, HIT, SLAPPED, OR OTHERWISE PHYSICALLY HURT BY YOUR PARTNER OR EX-PARTNER?: PATIENT UNABLE TO ANSWER

## 2025-01-30 SDOH — SOCIAL STABILITY: SOCIAL INSECURITY: HAVE YOU HAD ANY THOUGHTS OF HARMING ANYONE ELSE?: UNABLE TO ASSESS

## 2025-01-30 SDOH — SOCIAL STABILITY: SOCIAL INSECURITY: HAS ANYONE EVER THREATENED TO HURT YOUR FAMILY OR YOUR PETS?: UNABLE TO ASSESS

## 2025-01-30 SDOH — ECONOMIC STABILITY: INCOME INSECURITY
IN THE PAST 12 MONTHS HAS THE ELECTRIC, GAS, OIL, OR WATER COMPANY THREATENED TO SHUT OFF SERVICES IN YOUR HOME?: PATIENT UNABLE TO ANSWER

## 2025-01-30 SDOH — SOCIAL STABILITY: SOCIAL INSECURITY: WITHIN THE LAST YEAR, HAVE YOU BEEN AFRAID OF YOUR PARTNER OR EX-PARTNER?: PATIENT UNABLE TO ANSWER

## 2025-01-30 SDOH — SOCIAL STABILITY: SOCIAL INSECURITY
WITHIN THE LAST YEAR, HAVE YOU BEEN RAPED OR FORCED TO HAVE ANY KIND OF SEXUAL ACTIVITY BY YOUR PARTNER OR EX-PARTNER?: PATIENT UNABLE TO ANSWER

## 2025-01-30 SDOH — ECONOMIC STABILITY: FOOD INSECURITY
WITHIN THE PAST 12 MONTHS, YOU WORRIED THAT YOUR FOOD WOULD RUN OUT BEFORE YOU GOT THE MONEY TO BUY MORE.: PATIENT UNABLE TO ANSWER

## 2025-01-30 SDOH — ECONOMIC STABILITY: HOUSING INSECURITY: AT ANY TIME IN THE PAST 12 MONTHS, WERE YOU HOMELESS OR LIVING IN A SHELTER (INCLUDING NOW)?: PATIENT UNABLE TO ANSWER

## 2025-01-30 SDOH — SOCIAL STABILITY: SOCIAL INSECURITY: ARE YOU OR HAVE YOU BEEN THREATENED OR ABUSED PHYSICALLY, EMOTIONALLY, OR SEXUALLY BY ANYONE?: UNABLE TO ASSESS

## 2025-01-30 SDOH — SOCIAL STABILITY: SOCIAL INSECURITY: HAVE YOU HAD THOUGHTS OF HARMING ANYONE ELSE?: UNABLE TO ASSESS

## 2025-01-30 SDOH — ECONOMIC STABILITY: HOUSING INSECURITY: IN THE PAST 12 MONTHS, HOW MANY TIMES HAVE YOU MOVED WHERE YOU WERE LIVING?: 0

## 2025-01-30 SDOH — SOCIAL STABILITY: SOCIAL INSECURITY: ABUSE: ADULT

## 2025-01-30 ASSESSMENT — ACTIVITIES OF DAILY LIVING (ADL)
JUDGMENT_ADEQUATE_SAFELY_COMPLETE_DAILY_ACTIVITIES: NO
BATHING: DEPENDENT
HEARING - RIGHT EAR: DIFFICULTY WITH NOISE
FEEDING YOURSELF: INDEPENDENT
LACK_OF_TRANSPORTATION: PATIENT UNABLE TO ANSWER
WALKS IN HOME: NEEDS ASSISTANCE
TOILETING: NEEDS ASSISTANCE
LACK_OF_TRANSPORTATION: NO
ADEQUATE_TO_COMPLETE_ADL: UNABLE TO ASSESS
PATIENT'S MEMORY ADEQUATE TO SAFELY COMPLETE DAILY ACTIVITIES?: NO
HEARING - LEFT EAR: DIFFICULTY WITH NOISE
GROOMING: NEEDS ASSISTANCE
DRESSING YOURSELF: NEEDS ASSISTANCE
ASSISTIVE_DEVICE: WALKER

## 2025-01-30 ASSESSMENT — COGNITIVE AND FUNCTIONAL STATUS - GENERAL
MOVING FROM LYING ON BACK TO SITTING ON SIDE OF FLAT BED WITH BEDRAILS: A LITTLE
CLIMB 3 TO 5 STEPS WITH RAILING: A LOT
CLIMB 3 TO 5 STEPS WITH RAILING: A LOT
MOVING TO AND FROM BED TO CHAIR: A LITTLE
TOILETING: A LITTLE
DRESSING REGULAR UPPER BODY CLOTHING: A LITTLE
TURNING FROM BACK TO SIDE WHILE IN FLAT BAD: A LITTLE
DAILY ACTIVITIY SCORE: 20
STANDING UP FROM CHAIR USING ARMS: A LOT
DRESSING REGULAR LOWER BODY CLOTHING: A LITTLE
TURNING FROM BACK TO SIDE WHILE IN FLAT BAD: A LITTLE
MOVING TO AND FROM BED TO CHAIR: A LITTLE
STANDING UP FROM CHAIR USING ARMS: A LOT
DRESSING REGULAR UPPER BODY CLOTHING: A LITTLE
WALKING IN HOSPITAL ROOM: A LOT
MOVING TO AND FROM BED TO CHAIR: A LITTLE
DRESSING REGULAR LOWER BODY CLOTHING: A LITTLE
MOBILITY SCORE: 15
MOBILITY SCORE: 15
PERSONAL GROOMING: A LITTLE
HELP NEEDED FOR BATHING: A LOT
DAILY ACTIVITIY SCORE: 18
PERSONAL GROOMING: A LITTLE
CLIMB 3 TO 5 STEPS WITH RAILING: A LOT
PERSONAL GROOMING: A LITTLE
DAILY ACTIVITIY SCORE: 18
TOILETING: A LITTLE
TOILETING: A LITTLE
DRESSING REGULAR LOWER BODY CLOTHING: A LITTLE
WALKING IN HOSPITAL ROOM: A LOT
MOBILITY SCORE: 16
MOVING FROM LYING ON BACK TO SITTING ON SIDE OF FLAT BED WITH BEDRAILS: A LITTLE
PATIENT BASELINE BEDBOUND: NO
TURNING FROM BACK TO SIDE WHILE IN FLAT BAD: A LITTLE
STANDING UP FROM CHAIR USING ARMS: A LOT
DRESSING REGULAR UPPER BODY CLOTHING: A LITTLE
HELP NEEDED FOR BATHING: A LOT
WALKING IN HOSPITAL ROOM: A LOT

## 2025-01-30 ASSESSMENT — PAIN - FUNCTIONAL ASSESSMENT
PAIN_FUNCTIONAL_ASSESSMENT: 0-10

## 2025-01-30 ASSESSMENT — LIFESTYLE VARIABLES
AUDIT-C TOTAL SCORE: -1
SKIP TO QUESTIONS 9-10: 0
HOW OFTEN DO YOU HAVE A DRINK CONTAINING ALCOHOL: PATIENT UNABLE TO ANSWER
AUDIT-C TOTAL SCORE: -1
HOW MANY STANDARD DRINKS CONTAINING ALCOHOL DO YOU HAVE ON A TYPICAL DAY: PATIENT UNABLE TO ANSWER
HOW OFTEN DO YOU HAVE 6 OR MORE DRINKS ON ONE OCCASION: PATIENT UNABLE TO ANSWER

## 2025-01-30 ASSESSMENT — PAIN DESCRIPTION - LOCATION: LOCATION: RIB CAGE

## 2025-01-30 ASSESSMENT — PATIENT HEALTH QUESTIONNAIRE - PHQ9
1. LITTLE INTEREST OR PLEASURE IN DOING THINGS: NOT AT ALL
SUM OF ALL RESPONSES TO PHQ9 QUESTIONS 1 & 2: 0
2. FEELING DOWN, DEPRESSED OR HOPELESS: NOT AT ALL

## 2025-01-30 ASSESSMENT — PAIN SCALES - GENERAL
PAINLEVEL_OUTOF10: 8
PAINLEVEL_OUTOF10: 2
PAINLEVEL_OUTOF10: 6
PAINLEVEL_OUTOF10: 4

## 2025-01-30 ASSESSMENT — PAIN DESCRIPTION - ORIENTATION: ORIENTATION: LEFT

## 2025-01-30 NOTE — H&P
Chief Complaint     Chief Complaint   Patient presents with    Rib Injury     Pt to ED via EMS for left ribcage pain after walking with her walker earlier today. EMS came to the house twice today and the first time the one daughter who lives with her did not want her transported. The 2nd time she was brought to ED as she has continued to complain about the left side ribcage pain. Pt is A&Ox2 baseline per EMS. No hitting of head, no LOC, no thinners.       HPI    HPI: Neha Carranza is a 84 y.o. female  with a PMH significant of dementia, HTN  presents to [facility] ED following a fall. Patient stated that she has a history of vertigo,got dizzy and fell and hit her left ribcage. Daughter was with her earlier when she was brought in to the ED. According to staff the daughter and patient were talking back and forth about whether she wants to be admitted or not. Patient denied chest pain, dizziness, SOB, N,V, diarhhea.     ED course:     Vitals:  97, HR 90, RR 18, bp 149/82 and 95% on RA.  Labs:   -CBC: Hg 14.6  -CMP: Glu 142, Na 135  Imaging:   - Ct cervical spine: No acute fracture or traumatic subluxation of the cervical spine   - XR femur left: no acute fracture  - CT head: No acute intracranial abnormality, The ventricles are slightly disproportionately enlarged compared to the adjacent sulci which is favored to represent central volume loss, however communicating hydrocephalus cannot be excluded.    Interventions:  lidocaine, ibuprofen, oxycodone    ROS: 12 points review of system is negative except as stated in the HPI above.   ROS  Review of Systems     Past Medical History     Past Medical History:   Diagnosis Date    Benign hypertension 09/02/2023    Bladder cancer (Multi) 09/02/2023    Degenerative joint disease 09/02/2023    Encounter for screening mammogram for malignant neoplasm of breast     Visit for screening mammogram    Estrogen deficiency     DEXA 5/21 back nl, hip neck T-2.3 had 5  years fosamax    Heart murmur 09/02/2023 2/21echo EF 55-60% mild-mod MR aortic valve OK.    History of stroke 11/20/2023    Hyperlipidemia 09/02/2023    MCI (mild cognitive impairment)     4/18 declines meds    Mitral valve regurgitation 09/02/2023    Other conditions influencing health status     Bladder Cancer    Personal history of other diseases of the circulatory system     History of hypertension    Personal history of other diseases of the musculoskeletal system and connective tissue     History of osteopenia    Personal history of other specified conditions     History of blood loss    Renal cyst 09/02/2023    Large left drained    Vitamin D deficiency 09/02/2023      Surgical History     Past Surgical History:   Procedure Laterality Date    COLONOSCOPY  07/12/2013    Complete Colonoscopy    OTHER SURGICAL HISTORY  07/12/2013    Stress Test ECG Performed    US GUIDED ABSCESS DRAIN  8/12/2022    US GUIDED ABSCESS DRAIN [facility]     Family History     Family History   Problem Relation Name Age of Onset    Heart disease Mother      Heart failure Mother      Heart disease Father      Heart failure Father      Parkinsonism Sister       Social History     Social History     Socioeconomic History    Marital status:      Spouse name: Not on file    Number of children: Not on file    Years of education: Not on file    Highest education level: Not on file   Occupational History    Not on file   Tobacco Use    Smoking status: Never     Passive exposure: Never    Smokeless tobacco: Never   Vaping Use    Vaping status: Never Used   Substance and Sexual Activity    Alcohol use: Not Currently    Drug use: Never    Sexual activity: Not on file   Other Topics Concern    Not on file   Social History Narrative    Not on file     Social Drivers of Health     Financial Resource Strain: Not on file   Food Insecurity: No Food Insecurity (3/25/2024)    Received from [facility]  Vital Sign     Worried About Running Out of Food in the Last Year: Never true     Ran Out of Food in the Last Year: Never true   Transportation Needs: No Transportation Needs (3/25/2024)    Received from [facility]    PRAPARE - Transportation     Lack of Transportation (Medical): No     Lack of Transportation (Non-Medical): No   Physical Activity: Inactive (3/25/2024)    Received from [facility]    Exercise Vital Sign     Days of Exercise per Week: 0 days     Minutes of Exercise per Session: 0 min   Stress: No Stress Concern Present (3/25/2024)    Received from [facility]    [employer] - Occupational Stress Questionnaire     Feeling of Stress : Only a little   Social Connections: Socially Isolated (3/25/2024)    Received from [facility]    Social Connection and Isolation Panel [NHANES]     Frequency of Communication with Friends and Family: More than three times a week     Frequency of Social Gatherings with Friends and Family: Three times a week     Attends Alevism Services: Never     Active Member of Clubs or Organizations: No     Attends Club or Organization Meetings: Never     Marital Status:    Intimate Partner Violence: Not on file   Housing Stability: Low Risk  (3/25/2024)    Received from [facility]    Housing Stability Vital Sign     Unable to Pay for Housing in the Last Year: No     Number of Places Lived in the Last Year: 1     Unstable Housing in the Last Year: No     Tobacco Use: Low Risk  (1/29/2025)    Patient History     Smoking Tobacco Use: Never     Smokeless Tobacco Use: Never     Passive Exposure: Never      Social History     Substance and Sexual Activity   Alcohol Use Not Currently      Allergies     Allergies   Allergen Reactions    Tramadol Other     Vomiting    Adhesive Tape-Silicones Other    Ciprofloxacin Rash    Esomeprazole Rash     "  Meds    Scheduled medications  lidocaine, 1 patch, transdermal, Daily      Continuous medications     PRN medications       Objective     Vitals  Visit Vitals  /82 (BP Location: Left arm, Patient Position: Sitting)   Pulse 90   Temp 36.1 °C (97 °F) (Temporal)   Resp 18   Ht 1.676 m (5' 6\")   Wt 56.7 kg (125 lb)   SpO2 95%   BMI 20.18 kg/m²   Smoking Status Never   BSA 1.62 m²        Physical Examination:  GEN:  A&Ox0,   EYES: EOMI, non-injected sclera.  ENT: Moist mucous membranes, no apparent injuries or lesions.   CARDIO: Normal rate and regular rhythm. No murmurs, rubs, or gallops.  2+ equal pulses of the distal extremities.   PULM: Clear to auscultation bilaterally. No rales, rhonchi, or wheezes. Good symmetric chest expansion.  GI: BS+  Soft, non-distended. No rebound tenderness or guarding.  SKIN: Warm and dry, no rashes or lesions.  MSK: ROM intact the extremities without contractures.   EXT: No peripheral edema, contusions, or wounds.   NEURO: dementia, AO X 0    I/Os  No intake or output data in the 24 hours ending 01/30/25 0045  Vent Settings       Labs:   Results from last 72 hours   Lab Units 01/29/25  2140   SODIUM mmol/L 135*   POTASSIUM mmol/L 4.4   CHLORIDE mmol/L 102   CO2 mmol/L 23   BUN mg/dL 26*   CREATININE mg/dL 0.98   GLUCOSE mg/dL 142*   CALCIUM mg/dL 9.1   ANION GAP mmol/L 14   EGFR mL/min/1.73m*2 57*      Results from last 72 hours   Lab Units 01/29/25  2140   WBC AUTO x10*3/uL 9.2   HEMOGLOBIN g/dL 14.6   HEMATOCRIT % 42.5   PLATELETS AUTO x10*3/uL 215   NEUTROS PCT AUTO % 69.1   LYMPHS PCT AUTO % 17.6   MONOS PCT AUTO % 9.4   EOS PCT AUTO % 3.1      Lab Results   Component Value Date    CALCIUM 9.1 01/29/2025    PHOS 3.5 10/10/2023      No results found for: \"CRP\"   [unfilled]     Micro/ID:   No results found for the last 90 days.                   No lab exists for component: \"AGALPCRNB\"   .ID  No results found for: \"URINECULTURE\", \"BLOODCULT\", \"CSFCULTSMEAR\"      Images  "   CT chest abdomen pelvis w IV contrast, CT thoracic spine wo IV contrast, CT lumbar spine wo IV contrast  Narrative: Interpreted By:  Kaushal Corrigan,   STUDY:  CT CHEST ABDOMEN PELVIS W IV CONTRAST; CT LUMBAR SPINE WO IV  CONTRAST; CT THORACIC SPINE WO IV CONTRAST;  1/29/2025 10:37 pm      INDICATION:  Signs/Symptoms:fall L rib pain; Signs/Symptoms:fall.      COMPARISON:  CT scan of the chest 10/03/2023, abdomen pelvis 12/21/2022      ACCESSION NUMBER(S):  [id number]; [id number]; [id number]      ORDERING CLINICIAN:  MARIA T PENA      TECHNIQUE:  Axial CT images of the chest, abdomen and pelvis with coronal and  sagittal reconstructed images obtained after intravenous  administration of 75 mL of Omnipaque 350. Axial, coronal sagittal  reformatted images of the thoracolumbar spine were obtained      FINDINGS:  CHEST:      VESSELS: Aorta is normal caliber. Atherosclerotic changes in the  aorta and coronary arteries. HEART: Heart size is mildly enlarged.  Aortic root and mitral annular calcifications noted. Trace  pericardial effusion. MEDIASTINUM AND GAETANO: No pathologically  enlarged thoracic lymph nodes. LUNG, PLEURA, LARGE AIRWAYS: There is  interlobular septal thickening ground-glass opacities suggestive of  developing interstitial edema. Trace bilateral pleural effusions and  atelectasis. No consolidation or pneumothorax. Calcified granulomas  noted in the right lung base. CHEST WALL AND LOWER NECK: Small hiatal  hernia. BONES: Bilateral shoulder osteoarthrosis. There is acute  minimally displaced fracture of the left 11th rib posteriorly.  Nondisplaced fracture of the left 12th rib posterolaterally.      ABDOMEN:      LIVER: Within normal limits.  BILE DUCTS: Mild central biliary ductal prominence with the  extrahepatic common duct dilated up to 1.2 cm. This tapers distally.  GALLBLADDER: Contracted gallbladder with biliary sludge and multiple  small calculi. PANCREAS: Within normal limits.  SPLEEN:  Numerous calcified splenic granulomas noted.  ADRENALS: Within normal limits.  KIDNEYS: Left kidney is atrophic when compared to the right. There is  a large thick-walled complex cystic lesion arising from the  interpolar region of the left kidney measuring 6.2 x 6.7 x 7.4 cm.  This demonstrates internal septations with peripheral and septal  calcifications. Right kidney is normal size without evidence for  calculi, hydronephrosis or hydroureter.      VESSELS: Calcific atherosclerosis of the aortoiliac and mesenteric  vessels with mild tortuosity. No aortic aneurysm. RETROPERITONEUM:  Within normal limits.      PELVIS:      REPRODUCTIVE ORGANS: Uterus is present. No adnexal mass.  BLADDER: Bladder is partially distended.      BOWEL: Small hiatal hernia. Stomach is underdistended visualized  loops of bowel are without evidence for obstruction. Moderate stool  burden.  Normal appendix. PERITONEUM: No ascites or free air, no  fluid collection.      ABDOMINAL WALL: Within normal limits.  BONES: Generalized diffuse osteopenia. Bilateral hip osteoarthrosis.              THORACIC SPINE:  ALIGNMENT: There is grade 1 anterolisthesis of T3 on 4.  VERTEBRAE: No acute fracture. There is stable mild compression  deformity along the superior endplate of the T4 vertebral body. There  is chronic anterior wedge deformity of the T12 vertebral body. There  is loss of disc height and anterior osteophyte formation at multiple  levels. Vacuum disc phenomena is noted in the lower thoracic spine.  SPINAL CANAL: No critical spinal canal stenosis. PREVERTEBRAL SOFT  TISSUES: No prevertebral soft tissue swelling.      LUMBAR SPINE:  ALIGNMENT: Dextroconvex scoliosis. Grade 1 anterolisthesis of L5 on  S1. VERTEBRAE: No acute fracture. There is loss of disc height and  anterior osteophyte formation at multiple levels. Facet hypertrophic  changes present in the mid and lower lumbar spine. SPINAL CANAL: No  critical spinal canal stenosis. Disc  spur complex at multiple levels  causes mild to moderate canal narrowing. PREVERTEBRAL SOFT TISSUES:  No prevertebral soft tissue swelling.      OTHER FINDINGS: None.      Impression: Acute mildly displaced fracture of the left 11th rib posteriorly.  Nondisplaced fracture of the left 12th rib posterolaterally. No  pneumothorax.      Cardiomegaly. There is interlobular septal thickening ground-glass  opacities suggestive of developing interstitial edema.      Trace bilateral pleural effusions and atelectasis. No consolidation.      No acute process in the abdomen and pelvis.      Cholelithiasis. Mild central biliary ductal prominence with the  extrahepatic common duct dilated up to 1.2 cm. This tapers distally.  Correlate with bilirubin and alkaline phosphatase levels for the need  for further evaluation.      Redemonstration of a complex thick-walled cystic lesion arising from  the interpolar region of the left kidney measuring 6.2 x 6.7 x 7.4  cm. This demonstrates internal septations with peripheral and septal  calcifications. This is incompletely characterized on this monophasic  study but stable. Correlate with prior workup or if warranted further  evaluation with nonemergent renal CT/MRI can be considered.      No acute fracture or traumatic subluxation of the thoracolumbar  spine. Remote fracture deformities of T4 and T12 vertebral bodies are  stable.      Multilevel discogenic degenerative changes as noted above.      MACRO:  None      Signed by: Kaushal Corrigan 1/30/2025 12:05 AM  Dictation workstation:   [id number]    Assessment and Plan    Problem list:   Assessment & Plan      Neha Carranza is a 84 y.o. female admitted for  pain management following a fall.       Acute Medical Issues   #mechanical fall  #pain control  PLAN:  - Pain control with tylenol, oxycodone, dilaudid and lidocaine patch.  - Will give 1 L LR, patient was dehydrated.   - Patient has a history of dementia, poor historian. She is  AOX0. Attempted to call daughter, did not answer. We would need a better understanding of patient wishes and disposition. Will admit to observation and  patient potentially could benefit from obs long term placement after dc.    Chronic Medical Issues   # HTN->continue losartan  #dementia-> continue memantine  #ascvd prevention-> continue aspirin    IVF: PRN   Electrolytes: Replete as needed   Diet: regular  GI ppx: none  DVT ppx: Lovenox   Antibiotics: none  Lines: iv  Code: Full Code     Code Status: Full Code   Emergency Contact: Extended Emergency Contact Information  Primary Emergency Contact: Latonia Chandler  Address: [id number] [place] [address] [place] [address]  Home Phone: [phone]  Relation: Daughter     Disposition: 84 y.o.female admitted for pain control. Estimated length of stay less than 48 hrs.     Kaushal Kelley MD  1/30/2025  Internal Medicine PGY-1

## 2025-01-30 NOTE — PROGRESS NOTES
Patient: Neha Carranza   Age: 84 y.o.   Gender: female   Room/Bed: 219/219-B     Attending: Milo Mercado DO  Code Status:  Full Code  Admitted Date: 1/29/2025  7:42 PM    Chief Complaint:  Patient: Neha Carranza is a 84 y.o. female with PMHX dementia, HTN, and personal history of vertigo presents to [facility] ED s/p fall.     OVERNIGHT: No significant events reported.         SUBJECTIVE:  No acute complaints or concerns. Wanting to go home. Restates that this episode was similar to previous vertigo episodes. Per daughter at bedside, she is supposed to take her meclizine regularly, but tends to use it whenever she feels onset of symptoms. Daughter also mentioned that Pt was not reported to be dizzy during the fall by caretaker and expresses fall is more likely due to 4-wheeled walker.           ALLERGIES PAST MEDICAL HISTORY PAST SURGICAL SURGERY FAMILY HISTORY SOCIAL HISTORY   Allergies   Allergen Reactions    Tramadol Other     Vomiting    Adhesive Tape-Silicones Other    Ciprofloxacin Rash    Esomeprazole Rash    Past Medical History:   Diagnosis Date    Benign hypertension 09/02/2023    Bladder cancer (Multi) 09/02/2023    Degenerative joint disease 09/02/2023    Encounter for screening mammogram for malignant neoplasm of breast     Visit for screening mammogram    Estrogen deficiency     DEXA 5/21 back nl, hip neck T-2.3 had 5 years fosamax    Heart murmur 09/02/2023 2/21echo EF 55-60% mild-mod MR aortic valve OK.    History of stroke 11/20/2023    Hyperlipidemia 09/02/2023    MCI (mild cognitive impairment)     4/18 declines meds    Mitral valve regurgitation 09/02/2023    Other conditions influencing health status     Bladder Cancer    Personal history of other diseases of the circulatory system     History of hypertension    Personal history of other diseases of the musculoskeletal system and connective tissue     History of osteopenia    Personal history of other specified  conditions     History of blood loss    Renal cyst 09/02/2023    Large left drained    Vitamin D deficiency 09/02/2023    Past Surgical History:   Procedure Laterality Date    COLONOSCOPY  07/12/2013    Complete Colonoscopy    OTHER SURGICAL HISTORY  07/12/2013    Stress Test ECG Performed    US GUIDED ABSCESS DRAIN  8/12/2022    US GUIDED ABSCESS DRAIN [facility]    Family History   Problem Relation Name Age of Onset    Heart disease Mother      Heart failure Mother      Heart disease Father      Heart failure Father      Parkinsonism Sister      Social History     Tobacco Use    Smoking status: Never     Passive exposure: Never    Smokeless tobacco: Never   Vaping Use    Vaping status: Never Used   Substance Use Topics    Alcohol use: Not Currently    Drug use: Never              MEDS:      HOME MEDS SCHEDULED MEDS PRN IV MEDS   Current Outpatient Medications   Medication Instructions    aspirin (ASPIR-81 ORAL) Take 1 tablet by mouth once daily at bedtime.    losartan (COZAAR) 50 mg, Daily    meclizine (ANTIVERT) 25 mg, oral, 3 times daily PRN    memantine (NAMENDA) 10 mg, Daily    enoxaparin, 40 mg, subcutaneous, q24h  lidocaine, 1 patch, transdermal, Daily  polyethylene glycol, 17 g, oral, Daily  sennosides-docusate sodium, 1 tablet, oral, Nightly     PRN medications: acetaminophen, HYDROmorphone, oxyCODONE, oxyCODONE                 OBJECTIVE:  Physical Exam  Constitutional:       Appearance: Normal appearance.   Cardiovascular:      Rate and Rhythm: Normal rate and regular rhythm.      Pulses: Normal pulses.      Heart sounds: Murmur heard.   Pulmonary:      Effort: Pulmonary effort is normal.      Breath sounds: Normal breath sounds.   Abdominal:      Palpations: Abdomen is soft.      Tenderness: There is no abdominal tenderness. There is no guarding or rebound.   Musculoskeletal:      Right lower leg: No edema.      Left lower leg: No edema.   Skin:     General: Skin is warm and dry.   Neurological:  "     General: No focal deficit present.      Mental Status: She is alert and oriented to person, place, and time.      Comments: Aox2-3   Psychiatric:         Mood and Affect: Mood normal.         Behavior: Behavior normal.                  VITALS:  Vitals:    01/30/25 0641 01/30/25 0730 01/30/25 1537 01/30/25 1600   BP: 169/79 173/89 152/90    BP Location:  Right arm Right arm    Patient Position:  Lying     Pulse: 68 66     Resp: 16 16 18    Temp: 36.5 °C (97.7 °F) 36.6 °C (97.9 °F)     TempSrc: Temporal Temporal     SpO2: 92% 93%  94%   Weight: 62.6 kg (138 lb)      Height: 1.575 m (5' 2\")              Intake/Output Summary (Last 24 hours) at 1/30/2025 1641  Last data filed at 1/30/2025 1230  Gross per 24 hour   Intake 955 ml   Output --   Net 955 ml         LABS:     CBC:  Recent Labs     01/30/25  0814 01/29/25  2140 10/10/23  0944 10/09/23  0607 10/08/23  0705   WBC 7.4 9.2 8.7 7.3 7.1   RBC 3.90* 4.39 4.35 3.95* 3.97*   HGB 12.9 14.6 14.1 13.0 12.9   HCT 38.1 42.5 43.5 39.7 39.5   MCV 98 97 100 101* 100   MCH 33.1 33.3 32.4 32.9 32.5   MCHC 33.9 34.4 32.4 32.7 32.7   RDW 12.5 12.4 12.4 12.4 12.4    215 198 182 183   MPV  --   --  10.2 10.3 10.8     CMP:  Recent Labs     01/30/25  0814 01/29/25  2140 10/10/23  0944    135* 137   K 3.8 4.4 3.9    102 104   CO2 26 23 26   ANIONGAP 10 14 11   BUN 18 26* 19   CREATININE 0.95 0.98 1.14*   EGFR 59* 57* 48*   GLUCOSE 93 142* 115*     Recent Labs     01/30/25  0814 01/29/25  2140 10/10/23  0944 10/05/23  0856 10/04/23  1612 10/03/23  1541 01/18/23  0626 12/21/22  2019 06/21/22  1446 01/10/22  1031   ALBUMIN 3.4 4.0 3.8   < > 3.9 4.5   < > 4.4   < > 4.3   ALKPHOS  --  55  --   --  58 71   < > 76   < > 69   ALT  --  21  --   --  39 39   < > 20   < > 12   AST  --  24  --   --  97* 106*   < > 30   < > 22   BILITOT  --  0.4  --   --  0.5 0.8   < > 0.5   < > 0.6   LIPASE  --   --   --   --   --   --   --  62  --  42    < > = values in this interval not " "displayed.     Calcium/Phos:   Recent Labs     01/30/25  0814 01/29/25  2140 10/10/23  0944 10/09/23  0607   CALCIUM 8.4* 9.1 8.9 8.6   PHOS 3.0  --  3.5 3.4      COAG:   Recent Labs     01/29/25  2140 08/12/22  0944 01/22/21  0748   INR 1.0 1.0 1.0     CRP: No results found for: \"CRP\"    ENDO:  Recent Labs     06/21/22  1446 04/08/22  1036 02/17/21  1628 12/12/19  1156 03/26/19  1403 07/27/18  1045   TSH 2.77  --  2.34 3.10 1.47  --    HGBA1C  --  5.2  --   --   --  5.2      CARDIAC:   Recent Labs     01/30/25  0118 01/29/25 2140 10/09/23  0607 10/08/23  0705 10/07/23  0602 10/03/23  2212   CKMB  --   --  4.2 4.1 3.5  --    TROPHS 12 11  --   --   --  633*   No results for input(s): \"CHOL\", \"LDLF\", \"LDL\", \"LDLCALC\", \"HDL\", \"TRIG\" in the last 84718 hours.  No data recorded    MICRO/ID:   No results found for the last 90 days.      No results found for: \"URINECULTURE\", \"BLOODCULT\", \"CSFCULTSMEAR\"      NUTRITION STATUS:           IMAGING:     ECG 12 lead    Result Date: 1/30/2025  Normal sinus rhythm Left axis deviation Left bundle branch block Abnormal ECG When compared with ECG of 03-OCT-2023 15:54, Nonspecific T wave abnormality no longer evident in Inferior leads T wave amplitude has increased in Anterior leads T wave inversion more evident in Lateral leads    CT chest abdomen pelvis w IV contrast    Result Date: 1/30/2025  Interpreted By:  Kaushal Corrigan, STUDY: CT CHEST ABDOMEN PELVIS W IV CONTRAST; CT LUMBAR SPINE WO IV CONTRAST; CT THORACIC SPINE WO IV CONTRAST;  1/29/2025 10:37 pm   INDICATION: Signs/Symptoms:fall L rib pain; Signs/Symptoms:fall.   COMPARISON: CT scan of the chest 10/03/2023, abdomen pelvis 12/21/2022   ACCESSION NUMBER(S): [id number]; [id number]; [id number]   ORDERING CLINICIAN: MARIA T PENA   TECHNIQUE: Axial CT images of the chest, abdomen and pelvis with coronal and sagittal reconstructed images obtained after intravenous administration of 75 mL of Omnipaque 350. Axial, coronal " sagittal reformatted images of the thoracolumbar spine were obtained   FINDINGS: CHEST:   VESSELS: Aorta is normal caliber. Atherosclerotic changes in the aorta and coronary arteries. HEART: Heart size is mildly enlarged. Aortic root and mitral annular calcifications noted. Trace pericardial effusion. MEDIASTINUM AND GAETANO: No pathologically enlarged thoracic lymph nodes. LUNG, PLEURA, LARGE AIRWAYS: There is interlobular septal thickening ground-glass opacities suggestive of developing interstitial edema. Trace bilateral pleural effusions and atelectasis. No consolidation or pneumothorax. Calcified granulomas noted in the right lung base. CHEST WALL AND LOWER NECK: Small hiatal hernia. BONES: Bilateral shoulder osteoarthrosis. There is acute minimally displaced fracture of the left 11th rib posteriorly. Nondisplaced fracture of the left 12th rib posterolaterally.   ABDOMEN:   LIVER: Within normal limits. BILE DUCTS: Mild central biliary ductal prominence with the extrahepatic common duct dilated up to 1.2 cm. This tapers distally. GALLBLADDER: Contracted gallbladder with biliary sludge and multiple small calculi. PANCREAS: Within normal limits. SPLEEN: Numerous calcified splenic granulomas noted. ADRENALS: Within normal limits. KIDNEYS: Left kidney is atrophic when compared to the right. There is a large thick-walled complex cystic lesion arising from the interpolar region of the left kidney measuring 6.2 x 6.7 x 7.4 cm. This demonstrates internal septations with peripheral and septal calcifications. Right kidney is normal size without evidence for calculi, hydronephrosis or hydroureter.   VESSELS: Calcific atherosclerosis of the aortoiliac and mesenteric vessels with mild tortuosity. No aortic aneurysm. RETROPERITONEUM: Within normal limits.   PELVIS:   REPRODUCTIVE ORGANS: Uterus is present. No adnexal mass. BLADDER: Bladder is partially distended.   BOWEL: Small hiatal hernia. Stomach is underdistended visualized  loops of bowel are without evidence for obstruction. Moderate stool burden.  Normal appendix. PERITONEUM: No ascites or free air, no fluid collection.   ABDOMINAL WALL: Within normal limits. BONES: Generalized diffuse osteopenia. Bilateral hip osteoarthrosis.       THORACIC SPINE: ALIGNMENT: There is grade 1 anterolisthesis of T3 on 4. VERTEBRAE: No acute fracture. There is stable mild compression deformity along the superior endplate of the T4 vertebral body. There is chronic anterior wedge deformity of the T12 vertebral body. There is loss of disc height and anterior osteophyte formation at multiple levels. Vacuum disc phenomena is noted in the lower thoracic spine. SPINAL CANAL: No critical spinal canal stenosis. PREVERTEBRAL SOFT TISSUES: No prevertebral soft tissue swelling.   LUMBAR SPINE: ALIGNMENT: Dextroconvex scoliosis. Grade 1 anterolisthesis of L5 on S1. VERTEBRAE: No acute fracture. There is loss of disc height and anterior osteophyte formation at multiple levels. Facet hypertrophic changes present in the mid and lower lumbar spine. SPINAL CANAL: No critical spinal canal stenosis. Disc spur complex at multiple levels causes mild to moderate canal narrowing. PREVERTEBRAL SOFT TISSUES: No prevertebral soft tissue swelling.   OTHER FINDINGS: None.       Acute mildly displaced fracture of the left 11th rib posteriorly. Nondisplaced fracture of the left 12th rib posterolaterally. No pneumothorax.   Cardiomegaly. There is interlobular septal thickening ground-glass opacities suggestive of developing interstitial edema.   Trace bilateral pleural effusions and atelectasis. No consolidation.   No acute process in the abdomen and pelvis.   Cholelithiasis. Mild central biliary ductal prominence with the extrahepatic common duct dilated up to 1.2 cm. This tapers distally. Correlate with bilirubin and alkaline phosphatase levels for the need for further evaluation.   Redemonstration of a complex thick-walled cystic  lesion arising from the interpolar region of the left kidney measuring 6.2 x 6.7 x 7.4 cm. This demonstrates internal septations with peripheral and septal calcifications. This is incompletely characterized on this monophasic study but stable. Correlate with prior workup or if warranted further evaluation with nonemergent renal CT/MRI can be considered.   No acute fracture or traumatic subluxation of the thoracolumbar spine. Remote fracture deformities of T4 and T12 vertebral bodies are stable.   Multilevel discogenic degenerative changes as noted above.   MACRO: None   Signed by: Kaushal Corrigan 1/30/2025 12:05 AM Dictation workstation:   [id number]    CT thoracic spine wo IV contrast    Result Date: 1/30/2025  Interpreted By:  Kaushal Corrigan, STUDY: CT CHEST ABDOMEN PELVIS W IV CONTRAST; CT LUMBAR SPINE WO IV CONTRAST; CT THORACIC SPINE WO IV CONTRAST;  1/29/2025 10:37 pm   INDICATION: Signs/Symptoms:fall L rib pain; Signs/Symptoms:fall.   COMPARISON: CT scan of the chest 10/03/2023, abdomen pelvis 12/21/2022   ACCESSION NUMBER(S): [id number]; [id number]; [id number]   ORDERING CLINICIAN: MARIA T PENA   TECHNIQUE: Axial CT images of the chest, abdomen and pelvis with coronal and sagittal reconstructed images obtained after intravenous administration of 75 mL of Omnipaque 350. Axial, coronal sagittal reformatted images of the thoracolumbar spine were obtained   FINDINGS: CHEST:   VESSELS: Aorta is normal caliber. Atherosclerotic changes in the aorta and coronary arteries. HEART: Heart size is mildly enlarged. Aortic root and mitral annular calcifications noted. Trace pericardial effusion. MEDIASTINUM AND GAETANO: No pathologically enlarged thoracic lymph nodes. LUNG, PLEURA, LARGE AIRWAYS: There is interlobular septal thickening ground-glass opacities suggestive of developing interstitial edema. Trace bilateral pleural effusions and atelectasis. No consolidation or pneumothorax. Calcified granulomas noted in  the right lung base. CHEST WALL AND LOWER NECK: Small hiatal hernia. BONES: Bilateral shoulder osteoarthrosis. There is acute minimally displaced fracture of the left 11th rib posteriorly. Nondisplaced fracture of the left 12th rib posterolaterally.   ABDOMEN:   LIVER: Within normal limits. BILE DUCTS: Mild central biliary ductal prominence with the extrahepatic common duct dilated up to 1.2 cm. This tapers distally. GALLBLADDER: Contracted gallbladder with biliary sludge and multiple small calculi. PANCREAS: Within normal limits. SPLEEN: Numerous calcified splenic granulomas noted. ADRENALS: Within normal limits. KIDNEYS: Left kidney is atrophic when compared to the right. There is a large thick-walled complex cystic lesion arising from the interpolar region of the left kidney measuring 6.2 x 6.7 x 7.4 cm. This demonstrates internal septations with peripheral and septal calcifications. Right kidney is normal size without evidence for calculi, hydronephrosis or hydroureter.   VESSELS: Calcific atherosclerosis of the aortoiliac and mesenteric vessels with mild tortuosity. No aortic aneurysm. RETROPERITONEUM: Within normal limits.   PELVIS:   REPRODUCTIVE ORGANS: Uterus is present. No adnexal mass. BLADDER: Bladder is partially distended.   BOWEL: Small hiatal hernia. Stomach is underdistended visualized loops of bowel are without evidence for obstruction. Moderate stool burden.  Normal appendix. PERITONEUM: No ascites or free air, no fluid collection.   ABDOMINAL WALL: Within normal limits. BONES: Generalized diffuse osteopenia. Bilateral hip osteoarthrosis.       THORACIC SPINE: ALIGNMENT: There is grade 1 anterolisthesis of T3 on 4. VERTEBRAE: No acute fracture. There is stable mild compression deformity along the superior endplate of the T4 vertebral body. There is chronic anterior wedge deformity of the T12 vertebral body. There is loss of disc height and anterior osteophyte formation at multiple levels. Vacuum  disc phenomena is noted in the lower thoracic spine. SPINAL CANAL: No critical spinal canal stenosis. PREVERTEBRAL SOFT TISSUES: No prevertebral soft tissue swelling.   LUMBAR SPINE: ALIGNMENT: Dextroconvex scoliosis. Grade 1 anterolisthesis of L5 on S1. VERTEBRAE: No acute fracture. There is loss of disc height and anterior osteophyte formation at multiple levels. Facet hypertrophic changes present in the mid and lower lumbar spine. SPINAL CANAL: No critical spinal canal stenosis. Disc spur complex at multiple levels causes mild to moderate canal narrowing. PREVERTEBRAL SOFT TISSUES: No prevertebral soft tissue swelling.   OTHER FINDINGS: None.       Acute mildly displaced fracture of the left 11th rib posteriorly. Nondisplaced fracture of the left 12th rib posterolaterally. No pneumothorax.   Cardiomegaly. There is interlobular septal thickening ground-glass opacities suggestive of developing interstitial edema.   Trace bilateral pleural effusions and atelectasis. No consolidation.   No acute process in the abdomen and pelvis.   Cholelithiasis. Mild central biliary ductal prominence with the extrahepatic common duct dilated up to 1.2 cm. This tapers distally. Correlate with bilirubin and alkaline phosphatase levels for the need for further evaluation.   Redemonstration of a complex thick-walled cystic lesion arising from the interpolar region of the left kidney measuring 6.2 x 6.7 x 7.4 cm. This demonstrates internal septations with peripheral and septal calcifications. This is incompletely characterized on this monophasic study but stable. Correlate with prior workup or if warranted further evaluation with nonemergent renal CT/MRI can be considered.   No acute fracture or traumatic subluxation of the thoracolumbar spine. Remote fracture deformities of T4 and T12 vertebral bodies are stable.   Multilevel discogenic degenerative changes as noted above.   MACRO: None   Signed by: Kaushal Corrigan 1/30/2025 12:05 AM  Dictation workstation:   [id number]    CT lumbar spine wo IV contrast    Result Date: 1/30/2025  Interpreted By:  Kaushal Corrigan, STUDY: CT CHEST ABDOMEN PELVIS W IV CONTRAST; CT LUMBAR SPINE WO IV CONTRAST; CT THORACIC SPINE WO IV CONTRAST;  1/29/2025 10:37 pm   INDICATION: Signs/Symptoms:fall L rib pain; Signs/Symptoms:fall.   COMPARISON: CT scan of the chest 10/03/2023, abdomen pelvis 12/21/2022   ACCESSION NUMBER(S): [id number]; [id number]; [id number]   ORDERING CLINICIAN: MARIA T PENA   TECHNIQUE: Axial CT images of the chest, abdomen and pelvis with coronal and sagittal reconstructed images obtained after intravenous administration of 75 mL of Omnipaque 350. Axial, coronal sagittal reformatted images of the thoracolumbar spine were obtained   FINDINGS: CHEST:   VESSELS: Aorta is normal caliber. Atherosclerotic changes in the aorta and coronary arteries. HEART: Heart size is mildly enlarged. Aortic root and mitral annular calcifications noted. Trace pericardial effusion. MEDIASTINUM AND GAETANO: No pathologically enlarged thoracic lymph nodes. LUNG, PLEURA, LARGE AIRWAYS: There is interlobular septal thickening ground-glass opacities suggestive of developing interstitial edema. Trace bilateral pleural effusions and atelectasis. No consolidation or pneumothorax. Calcified granulomas noted in the right lung base. CHEST WALL AND LOWER NECK: Small hiatal hernia. BONES: Bilateral shoulder osteoarthrosis. There is acute minimally displaced fracture of the left 11th rib posteriorly. Nondisplaced fracture of the left 12th rib posterolaterally.   ABDOMEN:   LIVER: Within normal limits. BILE DUCTS: Mild central biliary ductal prominence with the extrahepatic common duct dilated up to 1.2 cm. This tapers distally. GALLBLADDER: Contracted gallbladder with biliary sludge and multiple small calculi. PANCREAS: Within normal limits. SPLEEN: Numerous calcified splenic granulomas noted. ADRENALS: Within normal limits.  KIDNEYS: Left kidney is atrophic when compared to the right. There is a large thick-walled complex cystic lesion arising from the interpolar region of the left kidney measuring 6.2 x 6.7 x 7.4 cm. This demonstrates internal septations with peripheral and septal calcifications. Right kidney is normal size without evidence for calculi, hydronephrosis or hydroureter.   VESSELS: Calcific atherosclerosis of the aortoiliac and mesenteric vessels with mild tortuosity. No aortic aneurysm. RETROPERITONEUM: Within normal limits.   PELVIS:   REPRODUCTIVE ORGANS: Uterus is present. No adnexal mass. BLADDER: Bladder is partially distended.   BOWEL: Small hiatal hernia. Stomach is underdistended visualized loops of bowel are without evidence for obstruction. Moderate stool burden.  Normal appendix. PERITONEUM: No ascites or free air, no fluid collection.   ABDOMINAL WALL: Within normal limits. BONES: Generalized diffuse osteopenia. Bilateral hip osteoarthrosis.       THORACIC SPINE: ALIGNMENT: There is grade 1 anterolisthesis of T3 on 4. VERTEBRAE: No acute fracture. There is stable mild compression deformity along the superior endplate of the T4 vertebral body. There is chronic anterior wedge deformity of the T12 vertebral body. There is loss of disc height and anterior osteophyte formation at multiple levels. Vacuum disc phenomena is noted in the lower thoracic spine. SPINAL CANAL: No critical spinal canal stenosis. PREVERTEBRAL SOFT TISSUES: No prevertebral soft tissue swelling.   LUMBAR SPINE: ALIGNMENT: Dextroconvex scoliosis. Grade 1 anterolisthesis of L5 on S1. VERTEBRAE: No acute fracture. There is loss of disc height and anterior osteophyte formation at multiple levels. Facet hypertrophic changes present in the mid and lower lumbar spine. SPINAL CANAL: No critical spinal canal stenosis. Disc spur complex at multiple levels causes mild to moderate canal narrowing. PREVERTEBRAL SOFT TISSUES: No prevertebral soft tissue  swelling.   OTHER FINDINGS: None.       Acute mildly displaced fracture of the left 11th rib posteriorly. Nondisplaced fracture of the left 12th rib posterolaterally. No pneumothorax.   Cardiomegaly. There is interlobular septal thickening ground-glass opacities suggestive of developing interstitial edema.   Trace bilateral pleural effusions and atelectasis. No consolidation.   No acute process in the abdomen and pelvis.   Cholelithiasis. Mild central biliary ductal prominence with the extrahepatic common duct dilated up to 1.2 cm. This tapers distally. Correlate with bilirubin and alkaline phosphatase levels for the need for further evaluation.   Redemonstration of a complex thick-walled cystic lesion arising from the interpolar region of the left kidney measuring 6.2 x 6.7 x 7.4 cm. This demonstrates internal septations with peripheral and septal calcifications. This is incompletely characterized on this monophasic study but stable. Correlate with prior workup or if warranted further evaluation with nonemergent renal CT/MRI can be considered.   No acute fracture or traumatic subluxation of the thoracolumbar spine. Remote fracture deformities of T4 and T12 vertebral bodies are stable.   Multilevel discogenic degenerative changes as noted above.   MACRO: None   Signed by: Kaushal Corrigan 1/30/2025 12:05 AM Dictation workstation:   [id number]    CT cervical spine wo IV contrast    Result Date: 1/29/2025  Interpreted By:  Kaushal Corrigan, STUDY: CT CERVICAL SPINE WO IV CONTRAST;  1/29/2025 10:37 pm   INDICATION: Signs/Symptoms:fall.   COMPARISON: CT scan of the cervical spine 10/03/2023.   ACCESSION NUMBER(S): [id number]   ORDERING CLINICIAN: MARIA T PENA   TECHNIQUE: Axial noncontrast images of the cervical spine with coronal and sagittal reconstructed images.   FINDINGS: ALIGNMENT: There is grade 1 anterolisthesis of C4 on 5 with grade 1 retrolisthesis of C7 on T1. VERTEBRAE: No acute fracture. There is stable  chronic anterior wedge fracture of the C7 vertebral body with approximate 25% loss of vertebral body height. There is chronic fracture of the C6 spinous process. There is loss of disc height and anterior osteophyte formation at at multiple levels worse at C5-6 and C6-7. Facet and uncovertebral hypertrophic changes causes varying degrees of bilateral neural foraminal narrowing. Degenerative changes at the atlantodental interval. SPINAL CANAL: No critical spinal canal stenosis. Disc spur complex at C5-6, C6-7 and C7-T1 causes mild-to-moderate canal narrowing. PREVERTEBRAL SOFT TISSUES: No prevertebral soft tissue swelling. LUNG APICES: Biapical pleuroparenchymal scarring. Emphysematous changes noted in the lung apices.   OTHER FINDINGS: Atherosclerotic calcification of the carotid bifurcations.       No acute fracture or traumatic subluxation of the cervical spine.   Stable chronic fractures as noted above. Vascular multilevel discogenic degenerative changes of the spine.   MACRO: None   Signed by: Kaushal Corrigan 1/29/2025 11:40 PM Dictation workstation:   [id number]    XR femur left 2+ views    Result Date: 1/29/2025  Interpreted By:  Kaushal Corrigan, STUDY: XR FEMUR LEFT 2+ VIEWS; ;  1/29/2025 10:52 pm   INDICATION: Signs/Symptoms:L hip pain.     COMPARISON: None.   ACCESSION NUMBER(S): [id number]   ORDERING CLINICIAN: MARIA T PENA   FINDINGS: Two views of the femur are obtained.   Generalized diffuse osteopenia. No acute fracture dislocation. Mild left hip osteoarthrosis. Moderate tricompartmental left knee osteoarthrosis. Chondrocalcinosis of the medial and lateral compartments. Vascular calcifications present. Soft tissues are unremarkable.       No acute fracture.   Degenerative changes as noted above.   MACRO: None   Signed by: Kaushal Corrigan 1/29/2025 11:32 PM Dictation workstation:   [id number]    CT head wo IV contrast    Result Date: 1/29/2025  Interpreted By:  Kaushal Corrigan, STUDY: CT HEAD WO IV  CONTRAST;  1/29/2025 10:37 pm   INDICATION: Signs/Symptoms:fall.   COMPARISON: CT scan of the head 10/03/2023   ACCESSION NUMBER(S): [id number]   ORDERING CLINICIAN: MARIA T PENA   TECHNIQUE: Axial noncontrast CT images of the head.   FINDINGS: BRAIN PARENCHYMA: Small focal hypodensities bilateral basal ganglia and thalami likely sequela of remote lacunar infarcts. Gray-white matter interfaces are preserved. No mass, mass effect or midline shift. Marked deep and periventricular white matter hypodensities are nonspecific, but favored to represent chronic small vessel ischemic changes.   HEMORRHAGE: No acute intracranial hemorrhage. VENTRICLES and EXTRA-AXIAL SPACES: The ventricles are slightly disproportionately enlarged compared to  the adjacent sulci which is favored to represent central volume loss,  however communicating hydrocephalus cannot be excluded. Findings are stable from prior CT EXTRACRANIAL SOFT TISSUES: Within normal limits. PARANASAL SINUSES/MASTOIDS: Opacification of the left maxillary sinus with mucoperiosteal thickening, likely chronic. The visualized paranasal sinuses and mastoid air cells are aerated. CALVARIUM: No depressed skull fracture. No destructive osseous lesion.   OTHER FINDINGS: Atherosclerotic calcification of the carotid siphons.       No acute intracranial abnormality.   The ventricles are slightly disproportionately enlarged compared to the adjacent sulci which is favored to represent central volume loss, however communicating hydrocephalus cannot be excluded. Findings are stable from prior exam.   Chronic changes as noted above.   MACRO: None   Signed by: Kaushal Corrigan 1/29/2025 11:31 PM Dictation workstation:   [id number]          ASSESSMENT & PLAN  Neha Carranza is a 84 y.o. female with PMHX dementia, HTN, and personal history of vertigo presents to [facility] ED s/p fall.     ACUTE MEDICAL ISSUES    #Mechanical fall  #Multiple rib fractures  ::Hx of  vertigo (per family)  ::Uses walker at baseline  - Home meclizine 25 mg   - s/p 1L LR  PLAN:  - Pain control with tylenol, oxycodone, dilaudid and lidocaine patch.  - PT/OT consulted for eval. Pt's daughter requesting outside referral.   - Recommending to not use 4-wheeled walker to reduce risk of future falls    RESOLVED MEDICAL ISSUES:      CHRONIC MEDICAL ISSUES:  # HTN->continue losartan  #dementia-> continue memantine  #ascvd prevention-> continue aspirin  #Vertigo -> continue meclizine 25 mg TID PRN        Fluids: PRN  Electrolytes: Replete PRN  Nutrition:Regular  GI Prophylaxis: None  DVT Prophylaxis: SQLovenox, Reason: none  BM:      Access: PIV  Antibiotics: None  Oxygenation: RA    Emergency Contact: Extended Emergency Contact Information  Primary Emergency Contact: Latonia Chandler  Address: [id number] [place] [address] [place] [address]  Home Phone: [phone]  Relation: Daughter    Dispo: Home likely with outgoing HHC vs SNF pending PT/OT eval .         Leti Mckinley DO  Internal Medicine, PGY-1

## 2025-01-30 NOTE — ED PROVIDER NOTES
CC: Rib Injury (Pt to ED via EMS for left ribcage pain after walking with her walker earlier today. EMS came to the house twice today and the first time the one daughter who lives with her did not want her transported. The 2nd time she was brought to ED as she has continued to complain about the left side ribcage pain. Pt is A&Ox2 baseline per EMS. No hitting of head, no LOC, no thinners. )     HPI:  Patient is an 84-year-old female with a history of dementia and hypertension who lives alone and performs ADLs but does have a caregiver that comes into the home presenting to the emergency department for a fall.  She frequently has dizzy spells.  This is not new but she did get dizzy and slipped with her rollator landing on the left side of her ribs onto the handlebar of the rollator.  Patient endorsing pain to the left side of her ribs.  She denies hitting her head.  She is not on anticoagulation.  No nausea or vomiting.    Records Reviewed:  Recent available ED and inpatient notes reviewed in EMR.    PMHx/PSHx:  Per HPI.   - has a past medical history of Benign hypertension, Bladder cancer (Multi), Degenerative joint disease, Encounter for screening mammogram for malignant neoplasm of breast, Estrogen deficiency, Heart murmur, History of stroke, Hyperlipidemia, MCI (mild cognitive impairment), Mitral valve regurgitation, Other conditions influencing health status, Personal history of other diseases of the circulatory system, Personal history of other diseases of the musculoskeletal system and connective tissue, Personal history of other specified conditions, Renal cyst, and Vitamin D deficiency.  - has a past surgical history that includes Colonoscopy (07/12/2013); Other surgical history (07/12/2013); and US guided abscess drain (8/12/2022).  - has Adjustment disorder with depressed mood; Altered mental status; Anxiety; Asthenia; Benign hypertension; Cerebrovascular accident (Multi); Cerebrovascular disease; Clouded  consciousness; Vertigo; Dyslipidemia; Enthesopathy of hip region; Essential tremor; Fall; Heart murmur; Hyperlipidemia; Hyponatremia; Impaired cognition; Bladder cancer (Multi); Malignant neoplasm of overlapping sites of bladder (Multi); Malignant tumor of urinary bladder (Multi); Menopausal and postmenopausal disorder; Mitral valve regurgitation; Headache; Degenerative joint disease; Osteoarthritis; Osteoarthrosis of knee; Osteopenia; Pain of sternum; Painful breathing; Renal cyst; Restless legs; Cerebral hemorrhage (Multi); Seizure disorder (Multi); Sensorineural hearing loss, bilateral; Speech problem; Vascular dementia; Vitamin D deficiency; Word finding difficulty; Fall, initial encounter; History of stroke; and Impaired fasting blood sugar on their problem list.    Medications:  Reviewed in EMR. See EMR for complete list of medications and doses.    Allergies:  Tramadol, Adhesive tape-silicones, Ciprofloxacin, and Esomeprazole    Social History:  - Tobacco:  reports that she has never smoked. She has never been exposed to tobacco smoke. She has never used smokeless tobacco.   - Alcohol:  reports that she does not currently use alcohol.   - Illicit Drugs:  reports no history of drug use.     ROS:  Per HPI.       ???????????????????????????????????????????????????????????????  Triage Vitals:  T 36.1 °C (97 °F)  HR 90  /82  RR 18  O2 95 % None (Room air)    Physical Exam  ???????????????????????????????????????????????????????????????  GEN: Uncomfortable appearing, in mild acute distress  HEAD: atraumatic  EYES: PERRL, EOMI, no scleral icterus  ENT: mmm  CVS/CHEST: reg rate, nl rhythm.  Tenderness to left chest wall.  No crepitus  PULM: CTA b/l no wheezes, crackles, or rhonchi   GI: soft, NT/ND, no rebound or guarding   BACK: Thoracic and lumbar vertebral point tenderness  EXT: Pain in left hip, 2+ periph pulses in bilat radial and DP   NEURO: Awake and alert, Strength and sensation is equal in b/l  upper and lower extremities  SKIN: pale  PSYCH: AAOx2 -baseline per daughter at bedside     EKG:  As interpreted by me EKG read by me reviewed by me is normal sinus rhythm at 82 bpm.  Left axis deviation.  Some new T wave depressions noted in lateral leads that were not there in 2024.  Old T wave inversions in lateral leads.    Assessment and Plan:  Patient is an 84-year-old female presents to the emergency department for a fall.  She did get dizzy before the fall.  They state the dizziness is old.  However she does have new ST segment depressions and does live alone.  I anticipate patient would benefit from admission.  She has pain in the left side of her ribs as well as throughout her thoracic and lumbar spine on palpation and her hip.  Given the diffuse pain on exam CT imaging obtained.  Disposition pending imaging.  Patient is adamant that she would prefer discharge.  Will discuss disposition pending workup.  Patient was treated with a Lidoderm patch, ibuprofen as she took acetaminophen prior to arrival.  Patient endorsing pain after medicated.  She was given 1 oxycodone in the emergency department.  Workup remarkable for 2 rib fractures.  Patient still uncomfortable after oxycodone.  Given the dizziness and the fall and the new EKG changes as well as the 2 rib fractures patient will be admitted for pain control, incentive spirometer and telemetry monitoring regarding the dizziness.  Discussed with patient and daughter who are agreeable with the plan.    ED Course:  ED Course as of 01/30/25 0159   Wed Jan 29, 2025   2333 No acute intracranial abnormality.      The ventricles are slightly disproportionately enlarged compared to  the adjacent sulci which is favored to represent central volume loss,  however communicating hydrocephalus cannot be excluded. Findings are  stable from prior exam.   [HD]   2352 No acute fracture or traumatic subluxation of the cervical spine.      Stable chronic fractures as noted  above. Vascular multilevel  discogenic degenerative changes of the spine.   [HD]   2352 No acute fracture.      Degenerative changes as noted above.   [HD]   u Jan 30, 2025   0010 Acute mildly displaced fracture of the left 11th rib posteriorly.  Nondisplaced fracture of the left 12th rib posterolaterally. No  pneumothorax.   [HD]   0010 LFTS and bili normal despite cholelithasis on CT [HD]   0014 2 ribs fractures will admit for pain control   [HD]      ED Course User Index  [HD] Bernadine Manriquez DO         Diagnoses as of 01/30/25 0159   Dizzy   Fall, initial encounter   Closed fracture of multiple ribs of left side, initial encounter       Disposition:  admitted    Bernadine Manriquez DO      Procedures ? SmartLinks last updated 1/29/2025 10:27 PM        Bernadine Manriquez DO  01/30/25 0201

## 2025-01-30 NOTE — HOSPITAL COURSE
Brief HPI:  84 y.o. female  with a PMH significant of dementia, HTN, CVA, and dyslipidemia  presents to [facility] ED following a fall. Patient stated that she has a history of vertigo,. She got dizzy and fell and hit her left ribcage. Described it as similar to previous vertigo episodes. Per daughter, her caregiver who checks on her daily did not mention dizziness and instead describes it more like mechanical fall d/t rolling walker.     Patient lives alone. She denies syncope, nausea, vomiting, headache, and head trauma.     ED Course:    Vitals:  97, HR 90, RR 18, bp 149/82 and 95% on RA.  Labs:   -CBC: Hg 14.6  -CMP: Glu 142, Na 135  Imaging:   - CT T-spine: Acute mildly displaced fracture of L 11th rib posteriorly and nondisplaced fx of L 12th rib posterolaterally. Cardiomegaly noted with interlobular septal thickening ground-glass opacities suggestive of developing interstitial edema.   - Ct cervical spine: No acute fracture or traumatic subluxation of the cervical spine   - XR femur left: no acute fracture  - CT head: No acute intracranial abnormality, The ventricles are slightly disproportionately enlarged compared to the adjacent sulci which is favored to represent central volume loss, however communicating hydrocephalus cannot be excluded.     Interventions:  lidocaine, ibuprofen, oxycodone    Floors:  Pain controlled with Tylenol 650 mg Q4H, oxycodone 5 mg, and Dilaudid. No new episodes of dizziness/vertigo occurred during her stay.  Morning of 1/31, BP noted to be 196/90 and asymptomatic.  Patient had refused to take her medications unless her daughter was present and refused to have vitals rechecked.  With some encouragement, patient was compliant.  PT/OT consulted and recommends moderate intensity.  In discussion with her daughter, [facility] was preferred.

## 2025-01-30 NOTE — CONSULTS
"Nutrition Initial Assessment:   Nutrition Assessment    Reason for Assessment: Admission nursing screening (MST=2; Unsure of weight loss)    Patient is a 84 y.o. female presenting s/p fall with agitation.     CT revealed 11-12th rib fractures. Currently managing pts pain levels. IVFs administered given dehydration on admission.     PMH: Dementia, HTN, HLD, Hx bladder cancer, Malignant neoplasm of breast, Hx stroke, MVR, Renal cyst, & Vitamin D deficiency    Nutrition History:  Food and Nutrient History: Visit made, pt sleeping w/ daughter at bedside. Did not wake pt d/t being A&Ox0 so obtained history from pts daughter. Daughter reports that the pt has an in-home caregiver who assists w/ preparing meals for the pt. She states pt typically has 2-3 meals/day. Will only have x2 if she sleeps in past breakfast time. She states pt has had a gradual decline in PO intake over time but no drastic changes in the immediate past. Daughter reports pt has Ensure available at home but doesn't always drink them consistently. Is amenable to pt receiving chocolate Ensure Plus HP BID. No additional needs reported at this time.  Vitamin/Herbal Supplement Use: Denies  Food Allergy:  (None)     Anthropometrics:  Height: 157.5 cm (5' 2\")   Weight: 62.6 kg (138 lb) --> ? accuracy  BMI (Calculated): 25.23  IBW/kg (Dietitian Calculated): 50 kg  Percent of IBW: 125 %     Weight History:   Wt Readings from Last 60 Encounters:   01/30/25 62.6 kg (138 lb) --> Admit wt, ? accuracy   09/30/24 58.6 kg (129 lb)   01/09/24 56.2 kg (124 lb)     Daughter reports pt w/ UBW of 127# at home recently. Notes she feels admit wt to be inaccurate.     Weight Change %:  Weight History / % Weight Change: Stable weight over the past ~1 year noted.  Significant Weight Loss: No    Nutrition Focused Physical Exam Findings:  Defer: Pt sleeping at time of visit. Did not wake given pt A&Ox0    Edema:  Edema: none    Physical Findings:  Skin:  (Intact)  Digestive " System Findings: Diarrhea  Mouth Findings:  (Daughter notes occasional cough after PO intake/drinking)    Nutrition Significant Labs:  BMP Trend:   Results from last 7 days   Lab Units 01/30/25  0814 01/29/25  2140   GLUCOSE mg/dL 93 142*   CALCIUM mg/dL 8.4* 9.1   SODIUM mmol/L 136 135*   POTASSIUM mmol/L 3.8 4.4   CO2 mmol/L 26 23   CHLORIDE mmol/L 104 102   BUN mg/dL 18 26*   CREATININE mg/dL 0.95 0.98      Nutrition Specific Medications:  Scheduled medications  polyethylene glycol, 17 g, oral, Daily  sennosides-docusate sodium, 1 tablet, oral, Nightly    I/O:   No recorded BM since admission.    Dietary Orders (From admission, onward)       Start     Ordered    01/30/25 0156  May Participate in Room Service With Assistance  ( ROOM SERVICE MAY PARTICIPATE WITH ASSISTANCE)  Once        Question:  .  Answer:  Yes    01/30/25 0156 01/30/25 0138  Adult diet Regular  Diet effective now        Question:  Diet type  Answer:  Regular    01/30/25 0137             Estimated Needs:   Total Energy Estimated Needs in 24 hours (kCal):  (5232-7263)  Method for Estimating Needs: 25-30 kcals/kg x IBW  Total Protein Estimated Needs in 24 Hours (g):  (50-60)  Method for Estimating 24 Hour Protein Needs: 1.0-1.2 g/kg x IBW  Total Fluid Estimated Needs in 24 Hours (mL):  (0398-6711)  Method for Estimating 24 Hour Fluid Needs: 1mL/kcal or per team        Nutrition Diagnosis   Malnutrition Diagnosis  Patient has Malnutrition Diagnosis:  (Unable to accurately assess given limited information at this time)    Nutrition Diagnosis  Patient has Nutrition Diagnosis: Yes  Diagnosis Status (1): New  Nutrition Diagnosis 1: Inadequate oral intake  Related to (1): dementia  As Evidenced by (1): reported reduction in appetite with 25-50% meal intake since admission       Nutrition Interventions/Recommendations      Nutrition Recommendations:  Continue a liberalized regular diet as tolerated  If s/sx of aspiration noted, recommend speech  therapy consult for formal bedside swallowing evaluation    Consider addition of a daily senior MVI w/ minerals     Please obtain updated measured weight when able    Nutrition Interventions/Goals:   Interventions: Medical food supplement  Medical Food Supplement: Commercial beverage medical food supplement therapy  Goal: Ensure Plus HP BID (350 kcals/20g protein each)      Nutrition Monitoring and Evaluation   Food/Nutrient Related History Monitoring  Monitoring and Evaluation Plan: Estimated Energy Intake  Estimated Energy Intake: Energy intake greater or equal to 75% of estimated energy needs      Time Spent (min): 60 minutes

## 2025-01-30 NOTE — ED TRIAGE NOTES
Pt to ED via EMS for left ribcage pain after walking with her walker earlier today. EMS came to the house twice today and the first time the one daughter who lives with her did not want her transported. The 2nd time she was brought to ED as she has continued to complain about the left side ribcage pain. Pt is A&Ox2 baseline per EMS. No hitting of head, no LOC, no thinners.

## 2025-01-30 NOTE — PROGRESS NOTES
01/30/25 1139   Discharge Planning   Living Arrangements Alone  ([facility] independent living)   Support Systems Family members   Assistance Needed A&0x2-3 history of dementia, ambulates with walker, doesn't drive, room air, no cpap or bipap, active with private duty caregiver 4 hours/day- 7 days a week   Type of Residence Private residence   Number of Stairs to Enter Residence 0   Number of Stairs Within Residence 0   Do you have animals or pets at home? No   Who is requesting discharge planning? Provider   Home or Post Acute Services In home services   Expected Discharge Disposition Home H  (PT/OT pending. If recommending HHC would like [facility] for SN, PT, OT.)   Does the patient need discharge transport arranged? No   Financial Resource Strain   How hard is it for you to pay for the very basics like food, housing, medical care, and heating? Not hard   Housing Stability   In the last 12 months, was there a time when you were not able to pay the mortgage or rent on time? N   In the past 12 months, how many times have you moved where you were living? 0   At any time in the past 12 months, were you homeless or living in a shelter (including now)? N   Transportation Needs   In the past 12 months, has lack of transportation kept you from medical appointments or from getting medications? no   In the past 12 months, has lack of transportation kept you from meetings, work, or from getting things needed for daily living? No   Patient Choice   Provider Choice list and CMS website (https://medicare.gov/care-compare#search) for post-acute Quality and Resource Measure Data were provided and reviewed with: Family   Patient / Family choosing to utilize agency / facility established prior to hospitalization No   Stroke Family Assessment   Stroke Family Assessment Needed No   Intensity of Service   Intensity of Service 0-30 min     1/30/25 @ 1431: Call received from daughter that she anticipates she  will need SNF on discharge and would like her to go to [facility]. Request for referral made to [facility]. Await PT/OT recs. Would require precert.     1/30/25 @ 1544: [facility] has no beds available. Discussed with daughter via phone, SNF list sent to daughter via text, will follow up in am

## 2025-01-30 NOTE — DISCHARGE INSTRUCTIONS
Please, take your home medications as instructed after being discharged from the hospital.     NEW MEDICATIONS:  None    OTHER INSTRUCTIONS:        UPCOMING APPOINTMENTS:       Please, follow-up with your primary care provider within 7 to 14 days after leaving the hospital. /appointment services has been requested to make an appointment for you, however if you do not hear back from them within 1 to 2 days, please call your primary physician's office to schedule an appointment. Bring your photo ID and insurance card to your appointment.   [facility]  services can be reached at [phone].     If you experience any worsening symptoms or have any concerns, please contact your primary care provider to schedule an appointment. If you cannot get in touch with your primary care physician, please return to the nearest emergency room or urgent care clinic for an evaluation and treatment.     Thank you for choosing [facility] and allowing us to partake in your medical care!     - Your [facility] inpatient primary care team.

## 2025-01-31 PROCEDURE — 97165 OT EVAL LOW COMPLEX 30 MIN: CPT | Mod: GO | Performed by: OCCUPATIONAL THERAPIST

## 2025-01-31 PROCEDURE — 97530 THERAPEUTIC ACTIVITIES: CPT | Mod: GP

## 2025-01-31 PROCEDURE — 96372 THER/PROPH/DIAG INJ SC/IM: CPT

## 2025-01-31 PROCEDURE — 97161 PT EVAL LOW COMPLEX 20 MIN: CPT | Mod: GP

## 2025-01-31 PROCEDURE — 2500000004 HC RX 250 GENERAL PHARMACY W/ HCPCS (ALT 636 FOR OP/ED)

## 2025-01-31 PROCEDURE — 2500000001 HC RX 250 WO HCPCS SELF ADMINISTERED DRUGS (ALT 637 FOR MEDICARE OP): Performed by: BEHAVIOR TECHNICIAN

## 2025-01-31 PROCEDURE — 99232 SBSQ HOSP IP/OBS MODERATE 35: CPT | Performed by: BEHAVIOR TECHNICIAN

## 2025-01-31 PROCEDURE — G0378 HOSPITAL OBSERVATION PER HR: HCPCS

## 2025-01-31 PROCEDURE — 2500000001 HC RX 250 WO HCPCS SELF ADMINISTERED DRUGS (ALT 637 FOR MEDICARE OP)

## 2025-01-31 RX ORDER — ACETAMINOPHEN 325 MG/1
650 TABLET ORAL EVERY 4 HOURS
Start: 2025-01-31

## 2025-01-31 RX ORDER — LIDOCAINE 560 MG/1
1 PATCH PERCUTANEOUS; TOPICAL; TRANSDERMAL DAILY
Start: 2025-02-01

## 2025-01-31 RX ORDER — ACETAMINOPHEN 325 MG/1
650 TABLET ORAL EVERY 4 HOURS
Status: DISCONTINUED | OUTPATIENT
Start: 2025-01-31 | End: 2025-02-01 | Stop reason: HOSPADM

## 2025-01-31 RX ADMIN — ACETAMINOPHEN 650 MG: 325 TABLET, FILM COATED ORAL at 21:44

## 2025-01-31 RX ADMIN — LOSARTAN POTASSIUM 50 MG: 50 TABLET, FILM COATED ORAL at 10:20

## 2025-01-31 RX ADMIN — ACETAMINOPHEN 650 MG: 325 TABLET, FILM COATED ORAL at 14:18

## 2025-01-31 RX ADMIN — ASPIRIN 81 MG: 81 TABLET, CHEWABLE ORAL at 10:20

## 2025-01-31 RX ADMIN — ENOXAPARIN SODIUM 40 MG: 40 INJECTION SUBCUTANEOUS at 10:23

## 2025-01-31 RX ADMIN — SENNOSIDES AND DOCUSATE SODIUM 1 TABLET: 50; 8.6 TABLET ORAL at 21:44

## 2025-01-31 RX ADMIN — ACETAMINOPHEN 650 MG: 325 TABLET, FILM COATED ORAL at 10:23

## 2025-01-31 RX ADMIN — MEMANTINE 10 MG: 10 TABLET ORAL at 10:20

## 2025-01-31 RX ADMIN — ACETAMINOPHEN 650 MG: 325 TABLET, FILM COATED ORAL at 18:17

## 2025-01-31 ASSESSMENT — PAIN SCALES - PAIN ASSESSMENT IN ADVANCED DEMENTIA (PAINAD)
BREATHING: NORMAL
FACIALEXPRESSION: SMILING OR INEXPRESSIVE
BODYLANGUAGE: RELAXED
CONSOLABILITY: NO NEED TO CONSOLE
TOTALSCORE: 1
NEGVOCALIZATION: OCCASIONAL MOAN/GROAN, LOW SPEECH, NEGATIVE/DISAPPROVING QUALITY

## 2025-01-31 ASSESSMENT — PAIN - FUNCTIONAL ASSESSMENT
PAIN_FUNCTIONAL_ASSESSMENT: 0-10
PAIN_FUNCTIONAL_ASSESSMENT: WONG-BAKER FACES

## 2025-01-31 ASSESSMENT — ACTIVITIES OF DAILY LIVING (ADL)
ADL_ASSISTANCE: INDEPENDENT
ADL_ASSISTANCE: INDEPENDENT
ADLS_ADDRESSED: YES

## 2025-01-31 ASSESSMENT — COGNITIVE AND FUNCTIONAL STATUS - GENERAL
CLIMB 3 TO 5 STEPS WITH RAILING: TOTAL
PERSONAL GROOMING: A LITTLE
DRESSING REGULAR LOWER BODY CLOTHING: TOTAL
WALKING IN HOSPITAL ROOM: A LOT
DRESSING REGULAR UPPER BODY CLOTHING: A LOT
TOILETING: A LOT
HELP NEEDED FOR BATHING: A LOT
DRESSING REGULAR UPPER BODY CLOTHING: A LOT
WALKING IN HOSPITAL ROOM: TOTAL
TURNING FROM BACK TO SIDE WHILE IN FLAT BAD: A LOT
MOBILITY SCORE: 6
STANDING UP FROM CHAIR USING ARMS: A LOT
MOVING FROM LYING ON BACK TO SITTING ON SIDE OF FLAT BED WITH BEDRAILS: A LOT
MOVING FROM LYING ON BACK TO SITTING ON SIDE OF FLAT BED WITH BEDRAILS: A LOT
DRESSING REGULAR UPPER BODY CLOTHING: A LOT
TOILETING: A LOT
STANDING UP FROM CHAIR USING ARMS: A LOT
PERSONAL GROOMING: A LITTLE
CLIMB 3 TO 5 STEPS WITH RAILING: TOTAL
CLIMB 3 TO 5 STEPS WITH RAILING: A LOT
TURNING FROM BACK TO SIDE WHILE IN FLAT BAD: TOTAL
MOVING FROM LYING ON BACK TO SITTING ON SIDE OF FLAT BED WITH BEDRAILS: TOTAL
PERSONAL GROOMING: A LITTLE
MOBILITY SCORE: 11
MOVING TO AND FROM BED TO CHAIR: A LOT
DAILY ACTIVITIY SCORE: 13
DAILY ACTIVITIY SCORE: 14
HELP NEEDED FOR BATHING: A LOT
DRESSING REGULAR LOWER BODY CLOTHING: A LOT
DAILY ACTIVITIY SCORE: 14
MOVING TO AND FROM BED TO CHAIR: A LOT
MOVING TO AND FROM BED TO CHAIR: TOTAL
DRESSING REGULAR LOWER BODY CLOTHING: A LOT
TOILETING: TOTAL
WALKING IN HOSPITAL ROOM: A LOT
STANDING UP FROM CHAIR USING ARMS: TOTAL
EATING MEALS: A LITTLE
EATING MEALS: A LITTLE
HELP NEEDED FOR BATHING: A LOT
TURNING FROM BACK TO SIDE WHILE IN FLAT BAD: A LOT
MOBILITY SCORE: 12

## 2025-01-31 ASSESSMENT — PAIN SCALES - GENERAL: PAINLEVEL_OUTOF10: 8

## 2025-01-31 ASSESSMENT — PAIN DESCRIPTION - DESCRIPTORS
DESCRIPTORS: ACHING
DESCRIPTORS: ACHING

## 2025-01-31 ASSESSMENT — PAIN SCALES - WONG BAKER: WONGBAKER_NUMERICALRESPONSE: HURTS WHOLE LOT

## 2025-01-31 NOTE — PROGRESS NOTES
Occupational Therapy    Evaluation    Patient Name: Neha Carranza  MRN: [id number]  Department: [address]  Room: 219219-B  Today's Date: 1/31/2025  Time Calculation  Start Time: 0937  Stop Time: 0957  Time Calculation (min): 20 min    Assessment  IP OT Assessment  OT Assessment: Pt presents with extensive assistance needed during ADL performance and functional mobility.  Prognosis: Good  Barriers to Discharge Home: Caregiver assistance, Physical needs  Caregiver Assistance: Caregiver assistance needed per identified barriers - however, level of patient's required assistance exceeds assistance available at home (pt would benefit from 24 supervision/assistance)  Physical Needs: Ambulating household distances limited by function/safety, 24hr mobility assistance needed, 24hr ADL assistance needed, High falls risk due to function or environment  Evaluation/Treatment Tolerance: Patient tolerated treatment well  Medical Staff Made Aware: Yes  End of Session Communication: Bedside nurse  End of Session Patient Position: Bed, 3 rail up, Alarm on  Plan:  Treatment Interventions: ADL retraining, Functional transfer training, UE strengthening/ROM, Endurance training, Patient/family training, Equipment evaluation/education, Cognitive reorientation  OT Frequency: 3 times per week  OT Discharge Recommendations: Moderate intensity level of continued care  OT Recommended Transfer Status: Assist of 2  OT - OK to Discharge: Yes    Subjective   Current Problem:  1. Dizzy        2. Fall, initial encounter        3. Closed fracture of multiple ribs of left side, initial encounter          General:  General  Reason for Referral: OT for ADL assessment (84YOF who presented to [facility] on 1/29/25 following a fall at home with left ribcage pain.)  Past Medical History Relevant to Rehab: Vascular dementia (A&O x2 baseline), mild cognitive impairment, HTN, mitral valve regurgitation, vertigo, bladder cancer, osteopenia,  seizures  Family/Caregiver Present: Yes  Caregiver Feedback: Pt's daughter arrived mid-session, assisted with PLOF  Co-Treatment: PT (partial co eval)  Co-Treatment Reason: To maximize pt safety, participation and functional outcomes 2/2 cognitive deficits with discipline specific goals  Prior to Session Communication: Bedside nurse (in room with pt)  Patient Position Received: Bed, 3 rail up, Alarm on  Preferred Learning Style: verbal, visual (increased time to process; repetition)  General Comment: OT orders received and chart reviewed. Pt educated on reason for OT consultation and acute services. Pt agreeable to participate.  Precautions:  Hearing/Visual Limitations: appears to have mild [place]  UE Weight Bearing Status: Weight Bearing as Tolerated  LE Weight Bearing Status: Weight Bearing as Tolerated  Medical Precautions: Fall precautions  Precautions Comment: [place]           Pain:  Pain Assessment  Pain Assessment: 0-10  0-10 (Numeric) Pain Score: 8  Pain Type: Acute pain  Pain Location: Rib cage  Pain Orientation: Left (rib fractures from fall)  Pain Descriptors: Aching  Pain Frequency: Constant/continuous  Pain Interventions: Emotional support, Repositioned  Response to Interventions: Provider notified    Objective   Cognition:  Overall Cognitive Status: Impaired at baseline (alert and orient x 1-2 at baseline per chart/family confirms)  Arousal/Alertness: Appropriate responses to stimuli  Orientation Level:  (oriented to name and situation)  Following Commands: Follows one step commands with increased time (and repetition)  Attention:  (distracted easily)  Memory:  (impaired)  Problem Solving:  (impaired)  Safety/Judgement:  (impaired)  Insight: Moderate  Impulsive: Moderately  Processing Speed: Delayed     Confusion Assessment Method (CAM)  Acute Onset and Fluctuating Course (1A): No  [place] Agitation Sedation Scale  [place] Agitation Sedation Scale (RASS): Alert and calm  Home Living:  Type of Home:  Independent living ([facility])  Lives With: Alone (Private Duty assistant 7 days/week 4 hrs /day.)  Home Adaptive Equipment: Walker rolling or standard  Home Layout: One level  Home Access: No concerns  Bathroom Shower/Tub: Walk-in shower  Bathroom Toilet: Standard  Bathroom Equipment: Grab bars in shower, Grab bars around toilet  Bathroom Accessibility: no concerns  Home Living Comments: Dtr reports PTA, pt ambulates independently, dresses herself and baths herself. Private duty aide is there to supervise during these tasks and offers companionship more than physical assistance.   Prior Function:  Level of [place]: Independent with ADLs and functional transfers, Needs assistance with homemaking  Receives Help From:  (private duty assistance 7 days/week 4hrs/day)  ADL Assistance: Independent  Homemaking Assistance: Needs assistance  Ambulatory Assistance: Independent  IADL History:     ADL:  Eating Assistance: Independent  Grooming Assistance: Stand by  Grooming Deficit: Verbal cueing, Setup, Increased time to complete  Bathing Assistance:  (mod to max anticipated based on pt increased assistance needed for basic bed mobility and transfers)  UE Dressing Assistance: Minimal  UE Dressing Deficit: Increased time to complete, Setup, Supervision/safety, Verbal cueing  LE Dressing Assistance: Total  LE Dressing Deficit: Setup, Steadying, Requires assistive device for steadying, Verbal cueing, Supervision/safety, Increased time to complete, Thread RLE into underwear, Thread LLE into underwear, Pull up over hips, Thread RLE into pants, Thread LLE into pants, Don/doff R shoe, Don/doff L shoe (pt completed less than 25% of task)  Toileting Assistance with Device: Total  Toileting Deficit: Setup, Steadying, Verbal cueing, Supervison/safety, Increased time to complete, Bedside commode, Clothing management up, Clothing management down, Perineal hygiene (pt attempting to wipe back of leg and front of commode vs  herself despite verbal and visual cues given.)  Functional Assistance: Maximal (to max x 2 for balance and safety)  Functional Deficit: Steadying, Verbal cueing, Increased time to complete, Supervision/safety, Commode transfer  ADL Comments: pt demonstrates increased risk of fall and injury due to inability to transfer and ambulate without significant assistance.  Activity Tolerance:  Endurance: Tolerates 10 - 20 min exercise with multiple rests  Bed Mobility/Transfers: Bed Mobility  Bed Mobility: Yes  Bed Mobility 1  Bed Mobility 1: Supine to sitting  Level of Assistance 1: Maximum assistance, +2  Bed Mobility 4  Bed Mobility 4: Sitting to supine  Level of Assistance 4: Dependent, +2    Transfers  Transfer: Yes  Transfer 1  Technique 1: Sit to stand  Transfer Device 1: Walker  Transfer Level of Assistance 1: Maximum assistance, +2  Trials/Comments 1: VC for hand placement during/after transfer, upright posture, ww safety; significant forward/R trunk lean noted in standing  Transfers 2  Technique 2: Stand to sit, Sit to stand  Transfer Device 2: Walker  Transfer Level of Assistance 2: Maximum assistance, +2  Trials/Comments 2: [place] [facility]  Transfers 3  Technique 3: Stand to sit  Transfer Device 3: Walker  Transfer Level of Assistance 3: Maximum assistance, +2  Trials/Comments 3: impulsive and decreased safety awareness      Functional Mobility:  Functional Mobility  Functional Mobility Performed: Yes  Functional Mobility 1  Surface 1: Level tile  Device 1: Rolling walker  Assistance 1: Moderate assistance  Quality of Functional Mobility 1: Narrow base of support  Comments 1: pt seemed to have significant difficulty motor planning to move from bed to commode and commode to bed.  Moderate cues to shift weight and feet; shuffle steps at best.  Sitting Balance:  Static Sitting Balance  Static Sitting-Balance Support: Feet supported  Static Sitting-Level of Assistance: Close supervision  Dynamic Sitting Balance  Dynamic  Sitting-Balance Support: Feet supported  Dynamic Sitting-Level of Assistance: Close supervision  Standing Balance:  Static Standing Balance  Static Standing-Balance Support: Bilateral upper extremity supported  Static Standing-Level of Assistance: Moderate assistance  Dynamic Standing Balance  Dynamic Standing-Balance Support: Bilateral upper extremity supported  Dynamic Standing-Level of Assistance: Moderate assistance  Dynamic Standing-Balance: Turning  Modalities:  Modalities Used: No    Sensation:  Light Touch: No apparent deficits  Strength:  Strength Comments: BUE 3+/5 overall  Perception:  Inattention/Neglect: Appears intact  Initiation: Appears intact      Hand Function:  Hand Function  Gross Grasp: Functional  Coordination: Functional  Extremities: RUE   RUE : Within Functional Limits and LUE   LUE: Within Functional Limits      Outcome Measures: [facility] Daily Activity  Putting on and taking off regular lower body clothing: Total  Bathing (including washing, rinsing, drying): A lot  Putting on and taking off regular upper body clothing: A lot  Toileting, which includes using toilet, bedpan or urinal: Total  Taking care of personal grooming such as brushing teeth: A little  Eating Meals: None  Daily Activity - Total Score: 13      Education Documentation  Precautions, taught by Bethany Callaway OT at 1/31/2025  1:38 PM.  Learner: Family, Patient  Readiness: Acceptance  Method: Explanation  Response: Verbalizes Understanding    ADL Training, taught by Bethany Callaway OT at 1/31/2025  1:38 PM.  Learner: Family, Patient  Readiness: Acceptance  Method: Explanation  Response: Verbalizes Understanding    Education Comments  No comments found.      Goals:   Encounter Problems       Encounter Problems (Active)       ADLs       Patient with complete upper body dressing with independent level of assistance donning and doffing all UE clothes with no adaptive equipment while edge of bed  and standing       Start:  01/31/25     Expected End:  02/14/25            Patient with complete lower body dressing with independent level of assistance donning and doffing all LE clothes  with PRN adaptive equipment, reacher, shoe horn, sock-aid, dressing stick , and elastic shoe laces while supported sitting and standing       Start:  01/31/25    Expected End:  02/14/25            Patient will complete daily grooming tasks brushing teeth and washing face/hair with independent level of assistance and PRN adaptive equipment while supported sitting and standing.       Start:  01/31/25    Expected End:  02/14/25            Patient will complete toileting including hygiene clothing management/hygiene with modified independent level of assistance and grab bars.       Start:  01/31/25    Expected End:  02/14/25               TRANSFERS       Patient will complete sit to stand transfer with independent level of assistance and least restrictive device in order to improve safety and prepare for out of bed mobility.       Start:  01/31/25    Expected End:  02/14/25

## 2025-01-31 NOTE — PROGRESS NOTES
01/31/25 1016   Discharge Planning   Living Arrangements Alone  (Patient live at the [facility])   Support Systems Family members   Assistance Needed Alert and oriented 2-3, History of dementia, Ambulates with walker, Doesn't drive, Room air, no cpap or bipap, Active with private duty caregiver 4 hours/day- 7 days a week   Type of Residence Private residence  ([facility])   Number of Stairs to Enter Residence 0   Number of Stairs Within Residence 0   Do you have animals or pets at home? No   Who is requesting discharge planning? Provider   Home or Post Acute Services Post acute facilities (Rehab/SNF/etc)   Type of Post Acute Facility Services Skilled nursing   Expected Discharge Disposition SNF  (Referral to be sent to [facility] once PT/OT notes have been completed.)   Does the patient need discharge transport arranged? Yes   RoundTrip coordination needed? Yes   Has discharge transport been arranged? No   Patient Choice   Provider Choice list and CMS website (https://medicare.gov/care-compare#search) for post-acute Quality and Resource Measure Data were provided and reviewed with: Patient;Family   Patient / Family choosing to utilize agency / facility established prior to hospitalization No

## 2025-01-31 NOTE — PROGRESS NOTES
01/31/25 1000   Advance Directives (For Healthcare)   Have you reviewed your Advance Directive and is it valid for this stay? Yes   Advance Directive Patient has advance directive, copy in chart   Type of Healthcare Directive Health care treatment directive;Living will     Contacts updated via EPIC and POA/Living Will in chart.

## 2025-01-31 NOTE — PROGRESS NOTES
Patient: Neha Carranza   Age: 84 y.o.   Gender: female   Room/Bed: 219/219-B     Attending: Milo Mercado DO  Code Status:  Full Code  Admitted Date: 1/29/2025  7:42 PM    Chief Complaint:  Patient: Neha Carranza is a 84 y.o. female with PMHX dementia, HTN, and personal history of vertigo presents to [facility] ED s/p fall.     OVERNIGHT: No significant events reported.         SUBJECTIVE:  Patient refused to take her medications with her daughter present.  She also declined having her vitals rechecked, last BP noted to be SBP 190s.  Currently complains of left-sided hip/sacral pain.          ALLERGIES PAST MEDICAL HISTORY PAST SURGICAL SURGERY FAMILY HISTORY SOCIAL HISTORY   Allergies   Allergen Reactions    Tramadol Other     Vomiting    Adhesive Tape-Silicones Other    Ciprofloxacin Rash    Esomeprazole Rash    Past Medical History:   Diagnosis Date    Benign hypertension 09/02/2023    Bladder cancer (Multi) 09/02/2023    Degenerative joint disease 09/02/2023    Encounter for screening mammogram for malignant neoplasm of breast     Visit for screening mammogram    Estrogen deficiency     DEXA 5/21 back nl, hip neck T-2.3 had 5 years fosamax    Heart murmur 09/02/2023 2/21echo EF 55-60% mild-mod MR aortic valve OK.    History of stroke 11/20/2023    Hyperlipidemia 09/02/2023    MCI (mild cognitive impairment)     4/18 declines meds    Mitral valve regurgitation 09/02/2023    Other conditions influencing health status     Bladder Cancer    Personal history of other diseases of the circulatory system     History of hypertension    Personal history of other diseases of the musculoskeletal system and connective tissue     History of osteopenia    Personal history of other specified conditions     History of blood loss    Renal cyst 09/02/2023    Large left drained    Vitamin D deficiency 09/02/2023    Past Surgical History:   Procedure Laterality Date    COLONOSCOPY  07/12/2013     Complete Colonoscopy    OTHER SURGICAL HISTORY  07/12/2013    Stress Test ECG Performed    US GUIDED ABSCESS DRAIN  8/12/2022    US GUIDED ABSCESS DRAIN [facility]    Family History   Problem Relation Name Age of Onset    Heart disease Mother      Heart failure Mother      Heart disease Father      Heart failure Father      Parkinsonism Sister      Social History     Tobacco Use    Smoking status: Never     Passive exposure: Never    Smokeless tobacco: Never   Vaping Use    Vaping status: Never Used   Substance Use Topics    Alcohol use: Not Currently    Drug use: Never              MEDS:      HOME MEDS SCHEDULED MEDS PRN IV MEDS   Current Outpatient Medications   Medication Instructions    aspirin (ASPIR-81 ORAL) Take 1 tablet by mouth once daily at bedtime.    losartan (COZAAR) 50 mg, Daily    meclizine (ANTIVERT) 25 mg, oral, 3 times daily PRN    memantine (NAMENDA) 10 mg, Daily    acetaminophen, 650 mg, oral, q4h  aspirin, 81 mg, oral, Daily  enoxaparin, 40 mg, subcutaneous, q24h  lidocaine, 1 patch, transdermal, Daily  losartan, 50 mg, oral, Daily  memantine, 10 mg, oral, Daily  polyethylene glycol, 17 g, oral, Daily  sennosides-docusate sodium, 1 tablet, oral, Nightly     PRN medications: HYDROmorphone, meclizine, oxyCODONE, oxyCODONE                 OBJECTIVE:  Physical Exam  Constitutional:       Appearance: Normal appearance.   Cardiovascular:      Rate and Rhythm: Normal rate and regular rhythm.      Pulses: Normal pulses.      Heart sounds: Murmur heard.   Pulmonary:      Effort: Pulmonary effort is normal.      Breath sounds: Normal breath sounds.   Abdominal:      Palpations: Abdomen is soft.      Tenderness: There is no abdominal tenderness. There is no guarding or rebound.   Musculoskeletal:      Right lower leg: No edema.      Left lower leg: No edema.   Skin:     General: Skin is warm and dry.   Neurological:      General: No focal deficit present.      Mental Status: She is alert and  oriented to person, place, and time.      Comments: Aox2-3   Psychiatric:         Mood and Affect: Mood normal.         Behavior: Behavior normal.                  VITALS:  Vitals:    01/30/25 1920 01/31/25 0443 01/31/25 0501 01/31/25 1012   BP: (!) 174/92 (!) 196/90  133/81   BP Location: Right arm Left arm  Right arm   Patient Position: Sitting Sitting     Pulse: 77 79  90   Resp: 18 18  16   Temp:  36 °C (96.8 °F)  36.6 °C (97.9 °F)   TempSrc: Temporal Temporal  Temporal   SpO2: 95%  93% 90%   Weight:       Height:               Intake/Output Summary (Last 24 hours) at 1/31/2025 1312  Last data filed at 1/31/2025 0443  Gross per 24 hour   Intake --   Output 450 ml   Net -450 ml         LABS:     CBC:  Recent Labs     01/30/25  0814 01/29/25  2140 10/10/23  0944 10/09/23  0607 10/08/23  0705   WBC 7.4 9.2 8.7 7.3 7.1   RBC 3.90* 4.39 4.35 3.95* 3.97*   HGB 12.9 14.6 14.1 13.0 12.9   HCT 38.1 42.5 43.5 39.7 39.5   MCV 98 97 100 101* 100   MCH 33.1 33.3 32.4 32.9 32.5   MCHC 33.9 34.4 32.4 32.7 32.7   RDW 12.5 12.4 12.4 12.4 12.4    215 198 182 183   MPV  --   --  10.2 10.3 10.8     CMP:  Recent Labs     01/30/25  0814 01/29/25  2140 10/10/23  0944    135* 137   K 3.8 4.4 3.9    102 104   CO2 26 23 26   ANIONGAP 10 14 11   BUN 18 26* 19   CREATININE 0.95 0.98 1.14*   EGFR 59* 57* 48*   GLUCOSE 93 142* 115*     Recent Labs     01/30/25  0814 01/29/25  2140 10/10/23  0944 10/05/23  0856 10/04/23  1612 10/03/23  1541 01/18/23  0626 12/21/22  2019 06/21/22  1446 01/10/22  1031   ALBUMIN 3.4 4.0 3.8   < > 3.9 4.5   < > 4.4   < > 4.3   ALKPHOS  --  55  --   --  58 71   < > 76   < > 69   ALT  --  21  --   --  39 39   < > 20   < > 12   AST  --  24  --   --  97* 106*   < > 30   < > 22   BILITOT  --  0.4  --   --  0.5 0.8   < > 0.5   < > 0.6   LIPASE  --   --   --   --   --   --   --  62  --  42    < > = values in this interval not displayed.     Calcium/Phos:   Recent Labs     01/30/25  0814 01/29/25  2144  "10/10/23  0944 10/09/23  0607   CALCIUM 8.4* 9.1 8.9 8.6   PHOS 3.0  --  3.5 3.4      COAG:   Recent Labs     01/29/25  2140 08/12/22  0944 01/22/21  0748   INR 1.0 1.0 1.0     CRP: No results found for: \"CRP\"    ENDO:  Recent Labs     06/21/22  1446 04/08/22  1036 02/17/21  1628 12/12/19  1156 03/26/19  1403 07/27/18  1045   TSH 2.77  --  2.34 3.10 1.47  --    HGBA1C  --  5.2  --   --   --  5.2      CARDIAC:   Recent Labs     01/30/25  0118 01/29/25 2140 10/09/23  0607 10/08/23  0705 10/07/23  0602 10/03/23  2212   CKMB  --   --  4.2 4.1 3.5  --    TROPHS 12 11  --   --   --  633*   No results for input(s): \"CHOL\", \"LDLF\", \"LDL\", \"LDLCALC\", \"HDL\", \"TRIG\" in the last 47047 hours.  No data recorded    MICRO/ID:   No results found for the last 90 days.      No results found for: \"URINECULTURE\", \"BLOODCULT\", \"CSFCULTSMEAR\"      NUTRITION STATUS:           IMAGING:     ECG 12 lead    Result Date: 1/30/2025  Normal sinus rhythm Left axis deviation Left bundle branch block Abnormal ECG When compared with ECG of 03-OCT-2023 15:54, Nonspecific T wave abnormality no longer evident in Inferior leads T wave amplitude has increased in Anterior leads T wave inversion more evident in Lateral leads    CT chest abdomen pelvis w IV contrast    Result Date: 1/30/2025  Interpreted By:  Kaushal Corrigan, STUDY: CT CHEST ABDOMEN PELVIS W IV CONTRAST; CT LUMBAR SPINE WO IV CONTRAST; CT THORACIC SPINE WO IV CONTRAST;  1/29/2025 10:37 pm   INDICATION: Signs/Symptoms:fall L rib pain; Signs/Symptoms:fall.   COMPARISON: CT scan of the chest 10/03/2023, abdomen pelvis 12/21/2022   ACCESSION NUMBER(S): [id number]; [id number]; [id number]   ORDERING CLINICIAN: MARIA T PENA   TECHNIQUE: Axial CT images of the chest, abdomen and pelvis with coronal and sagittal reconstructed images obtained after intravenous administration of 75 mL of Omnipaque 350. Axial, coronal sagittal reformatted images of the thoracolumbar spine were obtained   " FINDINGS: CHEST:   VESSELS: Aorta is normal caliber. Atherosclerotic changes in the aorta and coronary arteries. HEART: Heart size is mildly enlarged. Aortic root and mitral annular calcifications noted. Trace pericardial effusion. MEDIASTINUM AND GAETANO: No pathologically enlarged thoracic lymph nodes. LUNG, PLEURA, LARGE AIRWAYS: There is interlobular septal thickening ground-glass opacities suggestive of developing interstitial edema. Trace bilateral pleural effusions and atelectasis. No consolidation or pneumothorax. Calcified granulomas noted in the right lung base. CHEST WALL AND LOWER NECK: Small hiatal hernia. BONES: Bilateral shoulder osteoarthrosis. There is acute minimally displaced fracture of the left 11th rib posteriorly. Nondisplaced fracture of the left 12th rib posterolaterally.   ABDOMEN:   LIVER: Within normal limits. BILE DUCTS: Mild central biliary ductal prominence with the extrahepatic common duct dilated up to 1.2 cm. This tapers distally. GALLBLADDER: Contracted gallbladder with biliary sludge and multiple small calculi. PANCREAS: Within normal limits. SPLEEN: Numerous calcified splenic granulomas noted. ADRENALS: Within normal limits. KIDNEYS: Left kidney is atrophic when compared to the right. There is a large thick-walled complex cystic lesion arising from the interpolar region of the left kidney measuring 6.2 x 6.7 x 7.4 cm. This demonstrates internal septations with peripheral and septal calcifications. Right kidney is normal size without evidence for calculi, hydronephrosis or hydroureter.   VESSELS: Calcific atherosclerosis of the aortoiliac and mesenteric vessels with mild tortuosity. No aortic aneurysm. RETROPERITONEUM: Within normal limits.   PELVIS:   REPRODUCTIVE ORGANS: Uterus is present. No adnexal mass. BLADDER: Bladder is partially distended.   BOWEL: Small hiatal hernia. Stomach is underdistended visualized loops of bowel are without evidence for obstruction. Moderate stool  burden.  Normal appendix. PERITONEUM: No ascites or free air, no fluid collection.   ABDOMINAL WALL: Within normal limits. BONES: Generalized diffuse osteopenia. Bilateral hip osteoarthrosis.       THORACIC SPINE: ALIGNMENT: There is grade 1 anterolisthesis of T3 on 4. VERTEBRAE: No acute fracture. There is stable mild compression deformity along the superior endplate of the T4 vertebral body. There is chronic anterior wedge deformity of the T12 vertebral body. There is loss of disc height and anterior osteophyte formation at multiple levels. Vacuum disc phenomena is noted in the lower thoracic spine. SPINAL CANAL: No critical spinal canal stenosis. PREVERTEBRAL SOFT TISSUES: No prevertebral soft tissue swelling.   LUMBAR SPINE: ALIGNMENT: Dextroconvex scoliosis. Grade 1 anterolisthesis of L5 on S1. VERTEBRAE: No acute fracture. There is loss of disc height and anterior osteophyte formation at multiple levels. Facet hypertrophic changes present in the mid and lower lumbar spine. SPINAL CANAL: No critical spinal canal stenosis. Disc spur complex at multiple levels causes mild to moderate canal narrowing. PREVERTEBRAL SOFT TISSUES: No prevertebral soft tissue swelling.   OTHER FINDINGS: None.       Acute mildly displaced fracture of the left 11th rib posteriorly. Nondisplaced fracture of the left 12th rib posterolaterally. No pneumothorax.   Cardiomegaly. There is interlobular septal thickening ground-glass opacities suggestive of developing interstitial edema.   Trace bilateral pleural effusions and atelectasis. No consolidation.   No acute process in the abdomen and pelvis.   Cholelithiasis. Mild central biliary ductal prominence with the extrahepatic common duct dilated up to 1.2 cm. This tapers distally. Correlate with bilirubin and alkaline phosphatase levels for the need for further evaluation.   Redemonstration of a complex thick-walled cystic lesion arising from the interpolar region of the left kidney  measuring 6.2 x 6.7 x 7.4 cm. This demonstrates internal septations with peripheral and septal calcifications. This is incompletely characterized on this monophasic study but stable. Correlate with prior workup or if warranted further evaluation with nonemergent renal CT/MRI can be considered.   No acute fracture or traumatic subluxation of the thoracolumbar spine. Remote fracture deformities of T4 and T12 vertebral bodies are stable.   Multilevel discogenic degenerative changes as noted above.   MACRO: None   Signed by: Kaushal Corrigan 1/30/2025 12:05 AM Dictation workstation:   [id number]    CT thoracic spine wo IV contrast    Result Date: 1/30/2025  Interpreted By:  Kaushal Corrigan, STUDY: CT CHEST ABDOMEN PELVIS W IV CONTRAST; CT LUMBAR SPINE WO IV CONTRAST; CT THORACIC SPINE WO IV CONTRAST;  1/29/2025 10:37 pm   INDICATION: Signs/Symptoms:fall L rib pain; Signs/Symptoms:fall.   COMPARISON: CT scan of the chest 10/03/2023, abdomen pelvis 12/21/2022   ACCESSION NUMBER(S): [id number]; [id number]; [id number]   ORDERING CLINICIAN: MARIA T PENA   TECHNIQUE: Axial CT images of the chest, abdomen and pelvis with coronal and sagittal reconstructed images obtained after intravenous administration of 75 mL of Omnipaque 350. Axial, coronal sagittal reformatted images of the thoracolumbar spine were obtained   FINDINGS: CHEST:   VESSELS: Aorta is normal caliber. Atherosclerotic changes in the aorta and coronary arteries. HEART: Heart size is mildly enlarged. Aortic root and mitral annular calcifications noted. Trace pericardial effusion. MEDIASTINUM AND GAETANO: No pathologically enlarged thoracic lymph nodes. LUNG, PLEURA, LARGE AIRWAYS: There is interlobular septal thickening ground-glass opacities suggestive of developing interstitial edema. Trace bilateral pleural effusions and atelectasis. No consolidation or pneumothorax. Calcified granulomas noted in the right lung base. CHEST WALL AND LOWER NECK: Small hiatal  hernia. BONES: Bilateral shoulder osteoarthrosis. There is acute minimally displaced fracture of the left 11th rib posteriorly. Nondisplaced fracture of the left 12th rib posterolaterally.   ABDOMEN:   LIVER: Within normal limits. BILE DUCTS: Mild central biliary ductal prominence with the extrahepatic common duct dilated up to 1.2 cm. This tapers distally. GALLBLADDER: Contracted gallbladder with biliary sludge and multiple small calculi. PANCREAS: Within normal limits. SPLEEN: Numerous calcified splenic granulomas noted. ADRENALS: Within normal limits. KIDNEYS: Left kidney is atrophic when compared to the right. There is a large thick-walled complex cystic lesion arising from the interpolar region of the left kidney measuring 6.2 x 6.7 x 7.4 cm. This demonstrates internal septations with peripheral and septal calcifications. Right kidney is normal size without evidence for calculi, hydronephrosis or hydroureter.   VESSELS: Calcific atherosclerosis of the aortoiliac and mesenteric vessels with mild tortuosity. No aortic aneurysm. RETROPERITONEUM: Within normal limits.   PELVIS:   REPRODUCTIVE ORGANS: Uterus is present. No adnexal mass. BLADDER: Bladder is partially distended.   BOWEL: Small hiatal hernia. Stomach is underdistended visualized loops of bowel are without evidence for obstruction. Moderate stool burden.  Normal appendix. PERITONEUM: No ascites or free air, no fluid collection.   ABDOMINAL WALL: Within normal limits. BONES: Generalized diffuse osteopenia. Bilateral hip osteoarthrosis.       THORACIC SPINE: ALIGNMENT: There is grade 1 anterolisthesis of T3 on 4. VERTEBRAE: No acute fracture. There is stable mild compression deformity along the superior endplate of the T4 vertebral body. There is chronic anterior wedge deformity of the T12 vertebral body. There is loss of disc height and anterior osteophyte formation at multiple levels. Vacuum disc phenomena is noted in the lower thoracic spine. SPINAL  CANAL: No critical spinal canal stenosis. PREVERTEBRAL SOFT TISSUES: No prevertebral soft tissue swelling.   LUMBAR SPINE: ALIGNMENT: Dextroconvex scoliosis. Grade 1 anterolisthesis of L5 on S1. VERTEBRAE: No acute fracture. There is loss of disc height and anterior osteophyte formation at multiple levels. Facet hypertrophic changes present in the mid and lower lumbar spine. SPINAL CANAL: No critical spinal canal stenosis. Disc spur complex at multiple levels causes mild to moderate canal narrowing. PREVERTEBRAL SOFT TISSUES: No prevertebral soft tissue swelling.   OTHER FINDINGS: None.       Acute mildly displaced fracture of the left 11th rib posteriorly. Nondisplaced fracture of the left 12th rib posterolaterally. No pneumothorax.   Cardiomegaly. There is interlobular septal thickening ground-glass opacities suggestive of developing interstitial edema.   Trace bilateral pleural effusions and atelectasis. No consolidation.   No acute process in the abdomen and pelvis.   Cholelithiasis. Mild central biliary ductal prominence with the extrahepatic common duct dilated up to 1.2 cm. This tapers distally. Correlate with bilirubin and alkaline phosphatase levels for the need for further evaluation.   Redemonstration of a complex thick-walled cystic lesion arising from the interpolar region of the left kidney measuring 6.2 x 6.7 x 7.4 cm. This demonstrates internal septations with peripheral and septal calcifications. This is incompletely characterized on this monophasic study but stable. Correlate with prior workup or if warranted further evaluation with nonemergent renal CT/MRI can be considered.   No acute fracture or traumatic subluxation of the thoracolumbar spine. Remote fracture deformities of T4 and T12 vertebral bodies are stable.   Multilevel discogenic degenerative changes as noted above.   MACRO: None   Signed by: Kaushal Corrigan 1/30/2025 12:05 AM Dictation workstation:   [id number]    CT lumbar spine wo IV  contrast    Result Date: 1/30/2025  Interpreted By:  Kaushal Corrigan, STUDY: CT CHEST ABDOMEN PELVIS W IV CONTRAST; CT LUMBAR SPINE WO IV CONTRAST; CT THORACIC SPINE WO IV CONTRAST;  1/29/2025 10:37 pm   INDICATION: Signs/Symptoms:fall L rib pain; Signs/Symptoms:fall.   COMPARISON: CT scan of the chest 10/03/2023, abdomen pelvis 12/21/2022   ACCESSION NUMBER(S): [id number]; [id number]; [id number]   ORDERING CLINICIAN: MARIA T PENA   TECHNIQUE: Axial CT images of the chest, abdomen and pelvis with coronal and sagittal reconstructed images obtained after intravenous administration of 75 mL of Omnipaque 350. Axial, coronal sagittal reformatted images of the thoracolumbar spine were obtained   FINDINGS: CHEST:   VESSELS: Aorta is normal caliber. Atherosclerotic changes in the aorta and coronary arteries. HEART: Heart size is mildly enlarged. Aortic root and mitral annular calcifications noted. Trace pericardial effusion. MEDIASTINUM AND GAETANO: No pathologically enlarged thoracic lymph nodes. LUNG, PLEURA, LARGE AIRWAYS: There is interlobular septal thickening ground-glass opacities suggestive of developing interstitial edema. Trace bilateral pleural effusions and atelectasis. No consolidation or pneumothorax. Calcified granulomas noted in the right lung base. CHEST WALL AND LOWER NECK: Small hiatal hernia. BONES: Bilateral shoulder osteoarthrosis. There is acute minimally displaced fracture of the left 11th rib posteriorly. Nondisplaced fracture of the left 12th rib posterolaterally.   ABDOMEN:   LIVER: Within normal limits. BILE DUCTS: Mild central biliary ductal prominence with the extrahepatic common duct dilated up to 1.2 cm. This tapers distally. GALLBLADDER: Contracted gallbladder with biliary sludge and multiple small calculi. PANCREAS: Within normal limits. SPLEEN: Numerous calcified splenic granulomas noted. ADRENALS: Within normal limits. KIDNEYS: Left kidney is atrophic when compared to the right. There  is a large thick-walled complex cystic lesion arising from the interpolar region of the left kidney measuring 6.2 x 6.7 x 7.4 cm. This demonstrates internal septations with peripheral and septal calcifications. Right kidney is normal size without evidence for calculi, hydronephrosis or hydroureter.   VESSELS: Calcific atherosclerosis of the aortoiliac and mesenteric vessels with mild tortuosity. No aortic aneurysm. RETROPERITONEUM: Within normal limits.   PELVIS:   REPRODUCTIVE ORGANS: Uterus is present. No adnexal mass. BLADDER: Bladder is partially distended.   BOWEL: Small hiatal hernia. Stomach is underdistended visualized loops of bowel are without evidence for obstruction. Moderate stool burden.  Normal appendix. PERITONEUM: No ascites or free air, no fluid collection.   ABDOMINAL WALL: Within normal limits. BONES: Generalized diffuse osteopenia. Bilateral hip osteoarthrosis.       THORACIC SPINE: ALIGNMENT: There is grade 1 anterolisthesis of T3 on 4. VERTEBRAE: No acute fracture. There is stable mild compression deformity along the superior endplate of the T4 vertebral body. There is chronic anterior wedge deformity of the T12 vertebral body. There is loss of disc height and anterior osteophyte formation at multiple levels. Vacuum disc phenomena is noted in the lower thoracic spine. SPINAL CANAL: No critical spinal canal stenosis. PREVERTEBRAL SOFT TISSUES: No prevertebral soft tissue swelling.   LUMBAR SPINE: ALIGNMENT: Dextroconvex scoliosis. Grade 1 anterolisthesis of L5 on S1. VERTEBRAE: No acute fracture. There is loss of disc height and anterior osteophyte formation at multiple levels. Facet hypertrophic changes present in the mid and lower lumbar spine. SPINAL CANAL: No critical spinal canal stenosis. Disc spur complex at multiple levels causes mild to moderate canal narrowing. PREVERTEBRAL SOFT TISSUES: No prevertebral soft tissue swelling.   OTHER FINDINGS: None.       Acute mildly displaced  fracture of the left 11th rib posteriorly. Nondisplaced fracture of the left 12th rib posterolaterally. No pneumothorax.   Cardiomegaly. There is interlobular septal thickening ground-glass opacities suggestive of developing interstitial edema.   Trace bilateral pleural effusions and atelectasis. No consolidation.   No acute process in the abdomen and pelvis.   Cholelithiasis. Mild central biliary ductal prominence with the extrahepatic common duct dilated up to 1.2 cm. This tapers distally. Correlate with bilirubin and alkaline phosphatase levels for the need for further evaluation.   Redemonstration of a complex thick-walled cystic lesion arising from the interpolar region of the left kidney measuring 6.2 x 6.7 x 7.4 cm. This demonstrates internal septations with peripheral and septal calcifications. This is incompletely characterized on this monophasic study but stable. Correlate with prior workup or if warranted further evaluation with nonemergent renal CT/MRI can be considered.   No acute fracture or traumatic subluxation of the thoracolumbar spine. Remote fracture deformities of T4 and T12 vertebral bodies are stable.   Multilevel discogenic degenerative changes as noted above.   MACRO: None   Signed by: Kaushal Corrigan 1/30/2025 12:05 AM Dictation workstation:   [id number]    CT cervical spine wo IV contrast    Result Date: 1/29/2025  Interpreted By:  Kaushal Corrigan, STUDY: CT CERVICAL SPINE WO IV CONTRAST;  1/29/2025 10:37 pm   INDICATION: Signs/Symptoms:fall.   COMPARISON: CT scan of the cervical spine 10/03/2023.   ACCESSION NUMBER(S): [id number]   ORDERING CLINICIAN: MARIA T PENA   TECHNIQUE: Axial noncontrast images of the cervical spine with coronal and sagittal reconstructed images.   FINDINGS: ALIGNMENT: There is grade 1 anterolisthesis of C4 on 5 with grade 1 retrolisthesis of C7 on T1. VERTEBRAE: No acute fracture. There is stable chronic anterior wedge fracture of the C7 vertebral body with  approximate 25% loss of vertebral body height. There is chronic fracture of the C6 spinous process. There is loss of disc height and anterior osteophyte formation at at multiple levels worse at C5-6 and C6-7. Facet and uncovertebral hypertrophic changes causes varying degrees of bilateral neural foraminal narrowing. Degenerative changes at the atlantodental interval. SPINAL CANAL: No critical spinal canal stenosis. Disc spur complex at C5-6, C6-7 and C7-T1 causes mild-to-moderate canal narrowing. PREVERTEBRAL SOFT TISSUES: No prevertebral soft tissue swelling. LUNG APICES: Biapical pleuroparenchymal scarring. Emphysematous changes noted in the lung apices.   OTHER FINDINGS: Atherosclerotic calcification of the carotid bifurcations.       No acute fracture or traumatic subluxation of the cervical spine.   Stable chronic fractures as noted above. Vascular multilevel discogenic degenerative changes of the spine.   MACRO: None   Signed by: Kaushal Corrigan 1/29/2025 11:40 PM Dictation workstation:   [id number]    XR femur left 2+ views    Result Date: 1/29/2025  Interpreted By:  Kaushal Corrigan, STUDY: XR FEMUR LEFT 2+ VIEWS; ;  1/29/2025 10:52 pm   INDICATION: Signs/Symptoms:L hip pain.     COMPARISON: None.   ACCESSION NUMBER(S): [id number]   ORDERING CLINICIAN: MARIA T PENA   FINDINGS: Two views of the femur are obtained.   Generalized diffuse osteopenia. No acute fracture dislocation. Mild left hip osteoarthrosis. Moderate tricompartmental left knee osteoarthrosis. Chondrocalcinosis of the medial and lateral compartments. Vascular calcifications present. Soft tissues are unremarkable.       No acute fracture.   Degenerative changes as noted above.   MACRO: None   Signed by: Kaushal Corrigan 1/29/2025 11:32 PM Dictation workstation:   [id number]    CT head wo IV contrast    Result Date: 1/29/2025  Interpreted By:  Kaushal Corrigan, STUDY: CT HEAD WO IV CONTRAST;  1/29/2025 10:37 pm   INDICATION: Signs/Symptoms:fall.    COMPARISON: CT scan of the head 10/03/2023   ACCESSION NUMBER(S): [id number]   ORDERING CLINICIAN: MARIA T PENA   TECHNIQUE: Axial noncontrast CT images of the head.   FINDINGS: BRAIN PARENCHYMA: Small focal hypodensities bilateral basal ganglia and thalami likely sequela of remote lacunar infarcts. Gray-white matter interfaces are preserved. No mass, mass effect or midline shift. Marked deep and periventricular white matter hypodensities are nonspecific, but favored to represent chronic small vessel ischemic changes.   HEMORRHAGE: No acute intracranial hemorrhage. VENTRICLES and EXTRA-AXIAL SPACES: The ventricles are slightly disproportionately enlarged compared to  the adjacent sulci which is favored to represent central volume loss,  however communicating hydrocephalus cannot be excluded. Findings are stable from prior CT EXTRACRANIAL SOFT TISSUES: Within normal limits. PARANASAL SINUSES/MASTOIDS: Opacification of the left maxillary sinus with mucoperiosteal thickening, likely chronic. The visualized paranasal sinuses and mastoid air cells are aerated. CALVARIUM: No depressed skull fracture. No destructive osseous lesion.   OTHER FINDINGS: Atherosclerotic calcification of the carotid siphons.       No acute intracranial abnormality.   The ventricles are slightly disproportionately enlarged compared to the adjacent sulci which is favored to represent central volume loss, however communicating hydrocephalus cannot be excluded. Findings are stable from prior exam.   Chronic changes as noted above.   MACRO: None   Signed by: Kaushal Corrigan 1/29/2025 11:31 PM Dictation workstation:   [id number]          ASSESSMENT & PLAN  Neha Carranza is a 84 y.o. female with PMHX dementia, HTN, and personal history of vertigo presents to [facility] ED s/p fall.     ACUTE MEDICAL ISSUES    #Mechanical fall  #Multiple rib fractures  ::Hx of vertigo (per family)  ::Uses walker at baseline  - Home meclizine 25 mg    - s/p 1L LR  PLAN:  - Pain control with tylenol (scheduled), oxycodone, dilaudid and lidocaine patch.  - Recommending to not use 4-wheeled walker to reduce risk of future falls  - PT/OT evaluated, recommending moderate intensity.  Referral to [facility] sent, daughter is aware.    RESOLVED MEDICAL ISSUES:      CHRONIC MEDICAL ISSUES:  # HTN->continue losartan  #dementia-> continue memantine  #ascvd prevention-> continue aspirin  #Vertigo -> continue meclizine 25 mg TID PRN        Fluids: PRN  Electrolytes: Replete PRN  Nutrition:Regular  GI Prophylaxis: None  DVT Prophylaxis: SQLovenox, Reason: none  BM:      Access: PIV  Antibiotics: None  Oxygenation: RA    Emergency Contact: Extended Emergency Contact Information  Primary Emergency Contact: Latonia Chandler  Address: [address] [place] [address]  Home Phone: [phone]  Relation: Daughter   needed? No  Secondary Emergency Contact: Nuzhat Mendoza  Address: [address] [place] [address]  Mobile Phone: [phone]  Relation: Daughter  Preferred language: English   needed? No    Dispo: Pending SNF placement.  Medically cleared for discharge.        Leti Mckinley, DO  Internal Medicine, PGY-1

## 2025-01-31 NOTE — PROGRESS NOTES
Physical Therapy    Physical Therapy Evaluation & Treatment    Patient Name: Neha Carranza  MRN: [id number]  Department: [address]  Room: 219219-B  Today's Date: 1/31/2025   Time Calculation  Start Time: 0925  Stop Time: 0959  Time Calculation (min): 34 min    Assessment/Plan   PT Assessment  PT Assessment Results: Decreased cognition, Decreased safety awareness, Decreased mobility, Decreased strength, Decreased endurance, Impaired balance, Pain  Rehab Prognosis: Fair  Barriers to Discharge Home: Caregiver assistance, Cognition needs, Physical needs  Caregiver Assistance: Caregiver assistance needed per identified barriers - however, level of patient's required assistance exceeds assistance available at home  Cognition Needs: 24hr supervision for safety awareness needed, Medication and/or medical management daily assist needed, Cognition-related high falls risk  Physical Needs: 24hr mobility assistance needed, 24hr ADL assistance needed, High falls risk due to function or environment, Returning to long-term care/other facility  Evaluation/Treatment Tolerance: Patient tolerated treatment well  Medical Staff Made Aware: Yes  End of Session Communication: Bedside nurse, Care Coordinator, Physician  End of Session Patient Position: Bed, 3 rail up, Alarm on (bed in chair position; breakfast tray setup; all needs within reach)     Assessment:   Pt is an 83 y/o female presenting for PT eval following a fall at home. Pt is currently A&O x2 and pleasantly confused. Pt required maxA x2 for bed mobility, transfers, and stepping to/from the bedside commode. Noted significant R trunk lean in sitting and standing, which daughter reports as baseline. Pt unable to successfully complete toileting hygiene, instead, was attempting to wipe her leg/commode. Recommend moderate intensity PT at discharge to increase strength, balance, and independence with mobility.       IP OR SWING BED PT PLAN  Inpatient or Swing Bed:  Inpatient  PT Plan  Treatment/Interventions: Bed mobility, Transfer training, Gait training, Strengthening, Therapeutic activity, Positioning  PT Plan: Ongoing PT  PT Frequency: 3 times per week  PT Discharge Recommendations: Moderate intensity level of continued care, 24 hr supervision due to cognition  Equipment Recommended upon Discharge: Wheeled walker, Wheelchair  PT Recommended Transfer Status: Assist x2 (bed<>chair only; FWW + gait belt)  PT - OK to Discharge: Yes      Subjective     General Visit Information:  General  Reason for Referral: Pt is an 83 yo female who presented to [facility] on 1/29/[facility] following a fall at home with left ribcage pain. PT consulted for impaired mobility and discharge planning.  Past Medical History Relevant to Rehab: Vascular dementia (A&O x2 baseline), mild cognitive impairment, HTN, mitral valve regurgitation, vertigo, bladder cancer, osteopenia, seizures  Family/Caregiver Present: Yes  Caregiver Feedback: Pt's daughter arrived mid-session, assisted with PLOF  Co-Treatment: OT  Co-Treatment Reason: To maximize pt safety, participation and functional outcomes 2/2 cognitive deficits  Prior to Session Communication: Bedside nurse  Patient Position Received: Bed, 3 rail up, Alarm on  General Comment: Pt pleasantly confused and cooperative with eval. Requesting to use the bathroom.  Home Living:  Home Living  Type of Home: Long Term Care facility  Lives With: Alone  Home Adaptive Equipment: Walker rolling or standard (2 FWW + rollator)  Home Layout: One level  Home Access: No concerns  Home Living Comments: Pt resides in [facility]  Prior Level of Function:  Prior Function Per Pt/Caregiver Report  Level of [place]: Independent with ADLs and functional transfers, Needs assistance with homemaking  Receives Help From: Primary caregiver  ADL Assistance: Independent  Homemaking Assistance: Needs assistance (staff completes)  Ambulatory Assistance: Independent (with  rollator)  Prior Function Comments: Pt has a caregiver 4hr/day 7d/week, who assists with I/ADLs. +Falls history (reason for admission). Pt reports falling while walking with her rollator. No head strike/LOC.  Precautions:  Precautions  Medical Precautions: Fall precautions  Precautions Comment: Rodolfo     Date/Time Vitals Session Patient Position Pulse Resp SpO2 BP MAP (mmHg)    01/31/25 1012 --  --  90  16  90 %  133/81  98                Objective   Pain:  Pain Assessment  Pain Assessment: Ordaz-Baker FACES  Ordaz-Baker FACES Pain Rating: Hurts whole lot  Pain Type: Acute pain  Pain Location: Rib cage  Pain Orientation: Left  Pain Descriptors: Aching  Pain Frequency: Constant/continuous  Pain Interventions: Repositioned, Ambulation/increased activity  Response to Interventions: Provider notified (RN/MD notified at end of session)  Cognition:  Cognition  Overall Cognitive Status: Impaired at baseline  Arousal/Alertness: Appropriate responses to stimuli  Orientation Level: Disoriented to place, Disoriented to time  Following Commands: Follows one step commands with increased time    General Assessments:                  Activity Tolerance  Endurance: Tolerates 10 - 20 min exercise with multiple rests    Sensation  Light Touch: No apparent deficits    Strength  Strength Comments: BLE 2+/5  Postural Control  Postural Control: Impaired  Posture Comment: Significant R lateral trunk lean in sitting/standing. Daughter reports this as pt's baseline.    Static Sitting Balance  Static Sitting-Balance Support: Feet supported, Bilateral upper extremity supported  Static Sitting-Level of Assistance: Maximum assistance (x2)  Static Sitting-Comment/Number of Minutes: significant R lateral trunk lean upon sitting; maxA x2 required for repositioning; pt progressed to [place] by end of session    Static Standing Balance  Static Standing-Balance Support: Bilateral upper extremity supported  Static Standing-Level of Assistance: Maximum  assistance (x2)  Static Standing-Comment/Number of Minutes: with FWW  Functional Assessments:  ADL  ADL's Addressed: Yes  LE Dressing Assistance: Maximal  LE Dressing Deficit: Don/doff R shoe, Don/doff L shoe (slippers; pt limited in bending 2/2 rib pain)  Toileting Assistance with Device: Total  Toileting Deficit: Steadying, Supervison/safety, Increased time to complete, Bedside commode, Clothing management up, Clothing management down, Perineal hygiene    Bed Mobility 1  Bed Mobility 1: Supine to sitting  Level of Assistance 1: Maximum assistance, +2  Bed Mobility Comments 1: HOB elevated; increased time to complete; assist to manage BLE towards EOB; significant assist to obtain upright sitting  Bed Mobility 2  Bed Mobility  2: Scooting  Level of Assistance 2: Maximum assistance  Bed Mobility Comments 2: forward at EOB; max cues for positioning, use of UE/LE (significant difficulty noted); assisted with jonathan pad    Transfer 1  Technique 1: Sit to stand  Transfer Device 1: Walker  Transfer Level of Assistance 1: Maximum assistance, +2  Trials/Comments 1: VC for hand placement during/after transfer, upright posture, ww safety; significant forward/R trunk lean noted in standing  Transfers 3  Technique 3: Stand to sit  Transfer Device 3: Walker  Transfer Level of Assistance 3: Maximum assistance, +2  Trials/Comments 3: VC for positioning/hand placement (poor carryover); pt fatigued quickly and impulsively sat down    Ambulation/Gait Training 1  Surface 1: Level tile  Device 1: Rolling walker  Assistance 1: Maximum assistance (x2)  Quality of Gait 1: Narrow base of support, Decreased step length, Shuffling gait, Forward flexed posture, Soft knee(s)  Comments/Distance (ft) 1: 5' to BSC; verbal/tactile cues to assist with stepping; increased time/effort noted    Treatments:  Bed Mobility 3  Bed Mobility 3: Scooting  Level of Assistance 3: Dependent  Bed Mobility Comments 3: instructed pt in scooting laterally to right  (towards HOB); max cues for technique/positioning; unsuccessful despite max effort  Bed Mobility 4  Bed Mobility 4: Sitting to supine  Level of Assistance 4: Dependent, +2  Bed Mobility Comments 4: to manage BLE and safely lower trunk onto bed    Ambulation/Gait Training 2  Surface 2: Level tile  Device 2: Rolling walker  Assistance 2: Maximum assistance (x2)  Comments/Distance (ft) 2: 5' to bed; instructed on lateral stepping towards HOB (pt unable to perform)    Transfers 2  Technique 2: Stand to sit, Sit to stand  Transfer Device 2: Walker  Transfer Level of Assistance 2: Maximum assistance, +2  Trials/Comments 2: toilet transfer with BSC; VC for positioning, use of arm rests    Outcome Measures:  [facility] Basic Mobility  Turning from your back to your side while in a flat bed without using bedrails: Total  Moving from lying on your back to sitting on the side of a flat bed without using bedrails: Total  Moving to and from bed to chair (including a wheelchair): Total  Standing up from a chair using your arms (e.g. wheelchair or bedside chair): Total  To walk in hospital room: Total  Climbing 3-5 steps with railing: Total  Basic Mobility - Total Score: 6    Encounter Problems       Encounter Problems (Active)       Activity Tolerance       Patient will tolerate sitting upright in recliner for 20 minutes in order to eat meals OOB.        Start:  01/31/25    Expected End:  02/14/25               Mobility       Patient will ambulate household distance of 50' with FWW and CGA.        Start:  01/31/25    Expected End:  02/14/25               PT Transfers       Patient will transfer to and from sit to supine with supervision in order to decrease caregiver burden.        Start:  01/31/25    Expected End:  02/14/25            Patient will transfer sit to and from stand with FWW and CGA.        Start:  01/31/25    Expected End:  02/14/25            Patient will complete toilet transfers with CGA in order to complete toileting  with greater ease.        Start:  01/31/25    Expected End:  02/14/25                   Education Documentation  Body Mechanics, taught by Hoa Alberto PT at 1/31/2025 11:24 AM.  Learner: Family, Patient  Readiness: Acceptance  Method: Explanation, Demonstration  Response: Needs Reinforcement  Comment: Role of acute PT, POC, discharge planning, safety and body mechanics during mobility    Mobility Training, taught by Hoa Alberto, PT at 1/31/2025 11:24 AM.  Learner: Family, Patient  Readiness: Acceptance  Method: Explanation, Demonstration  Response: Needs Reinforcement  Comment: Role of acute PT, POC, discharge planning, safety and body mechanics during mobility    Education Comments  No comments found.

## 2025-02-01 VITALS
BODY MASS INDEX: 25.4 KG/M2 | DIASTOLIC BLOOD PRESSURE: 97 MMHG | SYSTOLIC BLOOD PRESSURE: 153 MMHG | OXYGEN SATURATION: 91 % | HEIGHT: 62 IN | RESPIRATION RATE: 16 BRPM | WEIGHT: 138 LBS | HEART RATE: 74 BPM | TEMPERATURE: 97.9 F

## 2025-02-01 PROBLEM — R42 VERTIGO: Status: RESOLVED | Noted: 2023-09-02 | Resolved: 2025-02-01

## 2025-02-01 PROBLEM — S22.42XA FRACTURE OF MULTIPLE RIBS OF LEFT SIDE: Status: ACTIVE | Noted: 2025-02-01

## 2025-02-01 PROBLEM — R42 DIZZY: Status: RESOLVED | Noted: 2025-01-30 | Resolved: 2025-02-01

## 2025-02-01 PROCEDURE — 2500000004 HC RX 250 GENERAL PHARMACY W/ HCPCS (ALT 636 FOR OP/ED)

## 2025-02-01 PROCEDURE — 2500000001 HC RX 250 WO HCPCS SELF ADMINISTERED DRUGS (ALT 637 FOR MEDICARE OP): Performed by: BEHAVIOR TECHNICIAN

## 2025-02-01 PROCEDURE — 99239 HOSP IP/OBS DSCHRG MGMT >30: CPT

## 2025-02-01 PROCEDURE — 96372 THER/PROPH/DIAG INJ SC/IM: CPT

## 2025-02-01 PROCEDURE — G0378 HOSPITAL OBSERVATION PER HR: HCPCS

## 2025-02-01 RX ADMIN — LOSARTAN POTASSIUM 50 MG: 50 TABLET, FILM COATED ORAL at 09:37

## 2025-02-01 RX ADMIN — ASPIRIN 81 MG: 81 TABLET, CHEWABLE ORAL at 09:37

## 2025-02-01 RX ADMIN — ENOXAPARIN SODIUM 40 MG: 40 INJECTION SUBCUTANEOUS at 10:14

## 2025-02-01 RX ADMIN — MEMANTINE 10 MG: 10 TABLET ORAL at 09:37

## 2025-02-01 RX ADMIN — ACETAMINOPHEN 650 MG: 325 TABLET, FILM COATED ORAL at 07:50

## 2025-02-01 ASSESSMENT — PAIN SCALES - WONG BAKER: WONGBAKER_NUMERICALRESPONSE: HURTS LITTLE BIT

## 2025-02-01 ASSESSMENT — COGNITIVE AND FUNCTIONAL STATUS - GENERAL
DAILY ACTIVITIY SCORE: 15
CLIMB 3 TO 5 STEPS WITH RAILING: TOTAL
MOVING FROM LYING ON BACK TO SITTING ON SIDE OF FLAT BED WITH BEDRAILS: A LOT
PERSONAL GROOMING: A LITTLE
DRESSING REGULAR LOWER BODY CLOTHING: A LOT
MOVING TO AND FROM BED TO CHAIR: A LOT
TOILETING: A LOT
TURNING FROM BACK TO SIDE WHILE IN FLAT BAD: A LOT
STANDING UP FROM CHAIR USING ARMS: A LOT
HELP NEEDED FOR BATHING: A LOT
DRESSING REGULAR UPPER BODY CLOTHING: A LOT
WALKING IN HOSPITAL ROOM: A LOT
MOBILITY SCORE: 11

## 2025-02-01 ASSESSMENT — PAIN SCALES - GENERAL: PAINLEVEL_OUTOF10: 4

## 2025-02-01 NOTE — NURSING NOTE
This nurse gave nurse to nurse report at [facility] [phone] at 0934.

## 2025-02-01 NOTE — PROGRESS NOTES
02/01/25 0900   Discharge Planning   Expected Discharge Disposition SNF  (Auth received for transition to [facility]. Transport 11am with [facility] stretcher. Pt and daughter aware. AVS and orders sent to [place] and they are aware of transport time. CNC to complete PASRR in [facility].)

## 2025-02-01 NOTE — CARE PLAN
The patient's goals for the shift include  pt having decreased pain     The clinical goals for the shift include pt being discharged to [facility].

## 2025-02-01 NOTE — CARE PLAN
Problem: Skin  Goal: Decreased wound size/increased tissue granulation at next dressing change  2/1/2025 0911 by Tita Morales RN  Outcome: Progressing  2/1/2025 0911 by Tita Morales RN  Outcome: Progressing  2/1/2025 0908 by Tita Morales RN  Outcome: Progressing  Goal: Participates in plan/prevention/treatment measures  2/1/2025 0911 by Tita Morales RN  Outcome: Progressing  2/1/2025 0908 by Tita Morales RN  Outcome: Progressing  Goal: Prevent/manage excess moisture  Outcome: Progressing  Goal: Prevent/minimize sheer/friction injuries  2/1/2025 0911 by Tita Morales RN  Outcome: Progressing  2/1/2025 0908 by Tita Morales RN  Outcome: Progressing  Goal: Promote/optimize nutrition  2/1/2025 0911 by iTta Morales RN  Outcome: Progressing  2/1/2025 0908 by Tita Morales RN  Outcome: Progressing  Goal: Promote skin healing  2/1/2025 0911 by Tita Morales RN  Outcome: Progressing  2/1/2025 0908 by Tita Morales RN  Outcome: Progressing   The patient's goals for the shift include  pt not having new skin wounds    The clinical goals for the shift include pt having no signs of skin breakdown.

## 2025-02-01 NOTE — CARE PLAN
The patient's goals for the shift include  to have decreased pain     The clinical goals for the shift include pt will be discharged to [facility].

## 2025-02-01 NOTE — CARE PLAN
The clinical goals for the shift include pt will remain safe, cooperative, and sleep >6 hours this shift

## 2025-02-01 NOTE — CARE PLAN
Uneventful night. Pt rested comfortably. Minimal c/o pain this shift. Pt slept most of the night. Pt continues to progress towards meeting goals. Plan for DC to [facility] today. VSS. Will continue to monitor.

## 2025-02-01 NOTE — DISCHARGE SUMMARY
Discharge Diagnosis  Mechanical fall 2/2 vertigo  Left 11th and 12th rib fractures  Left renal cyst    Issues Requiring Follow-Up  Follow up with PCP post hospitalization  Follow up for left renal cyst, consider CT or MRI    Discharge Meds     Medication List      START taking these medications     acetaminophen 325 mg tablet; Commonly known as: Tylenol; Take 2 tablets   (650 mg) by mouth every 4 hours.   lidocaine 4 % patch; Place 1 patch over 12 hours on the skin once daily.   Remove & discard patch within 12 hours or as directed by MD.     CONTINUE taking these medications     ASPIR-81 ORAL   losartan 25 mg tablet; Commonly known as: Cozaar   meclizine 25 mg tablet; Commonly known as: Antivert   memantine 5 mg tablet; Commonly known as: Namenda       Test Results Pending At Discharge  Pending Labs       No current pending labs.            Hospital Course  Brief HPI:  84 y.o. female  with a PMH significant of dementia, HTN, CVA, and dyslipidemia  presents to [facility] ED following a fall. Patient stated that she has a history of vertigo,. She got dizzy and fell and hit her left ribcage. Described it as similar to previous vertigo episodes. Per daughter, her caregiver who checks on her daily did not mention dizziness and instead describes it more like mechanical fall d/t rolling walker.     Patient lives alone. She denies syncope, nausea, vomiting, headache, and head trauma.     ED Course:    Vitals:  97, HR 90, RR 18, bp 149/82 and 95% on RA.  Labs:   -CBC: Hg 14.6  -CMP: Glu 142, Na 135  Imaging:   - CT T-spine: Acute mildly displaced fracture of L 11th rib posteriorly and nondisplaced fx of L 12th rib posterolaterally. Cardiomegaly noted with interlobular septal thickening ground-glass opacities suggestive of developing interstitial edema.   - Ct cervical spine: No acute fracture or traumatic subluxation of the cervical spine   - XR femur left: no acute fracture  - CT head: No acute intracranial  abnormality, The ventricles are slightly disproportionately enlarged compared to the adjacent sulci which is favored to represent central volume loss, however communicating hydrocephalus cannot be excluded.     Interventions:  lidocaine, ibuprofen, oxycodone    Floors:  Pain controlled with Tylenol 650 mg Q4H, oxycodone 5 mg, and Dilaudid. No new episodes of dizziness/vertigo occurred during her stay.  Morning of 1/31, BP noted to be 196/90 and asymptomatic.  Patient had refused to take her medications unless her daughter was present and refused to have vitals rechecked.  With some encouragement, patient was compliant.  PT/OT consulted and recommends moderate intensity.  In discussion with her daughter, [facility] was preferred.  On day of discharge, patient was hemodynamically stable and afebrile, in bright spirits. Still is endorsing mild pain in her rib area however is feeling better than on admission. Patient will be discharged to SNF in stable condition.    Pertinent Physical Exam At Time of Discharge  Physical Exam  Vitals reviewed.   Constitutional:       General: She is not in acute distress.     Appearance: She is not ill-appearing.   Cardiovascular:      Rate and Rhythm: Normal rate and regular rhythm.      Heart sounds: Murmur heard.   Pulmonary:      Effort: No respiratory distress.      Breath sounds: Normal breath sounds. No wheezing.   Chest:      Chest wall: Tenderness (Mild lower left tenderness to palpation of chest) present.   Abdominal:      General: There is no distension.      Palpations: Abdomen is soft.      Tenderness: There is no abdominal tenderness.   Musculoskeletal:         General: No tenderness.      Right lower leg: No edema.      Left lower leg: No edema.   Neurological:      General: No focal deficit present.      Mental Status: She is alert. Mental status is at baseline.         Outpatient Follow-Up  No future appointments.      Milton Barahona MD

## 2025-02-03 ENCOUNTER — LAB REQUISITION (OUTPATIENT)
Dept: LAB | Facility: HOSPITAL | Age: 85
End: 2025-02-03
Payer: MEDICARE

## 2025-02-03 DIAGNOSIS — C67.9 MALIGNANT NEOPLASM OF BLADDER, UNSPECIFIED: ICD-10-CM

## 2025-02-03 DIAGNOSIS — F03.90 UNSPECIFIED DEMENTIA, UNSPECIFIED SEVERITY, WITHOUT BEHAVIORAL DISTURBANCE, PSYCHOTIC DISTURBANCE, MOOD DISTURBANCE, AND ANXIETY: ICD-10-CM

## 2025-02-03 DIAGNOSIS — I63.9 CEREBRAL INFARCTION, UNSPECIFIED: ICD-10-CM

## 2025-02-03 LAB
ANION GAP SERPL CALC-SCNC: 14 MMOL/L (ref 10–20)
BUN SERPL-MCNC: 24 MG/DL (ref 6–23)
CALCIUM SERPL-MCNC: 8.3 MG/DL (ref 8.6–10.3)
CHLORIDE SERPL-SCNC: 104 MMOL/L (ref 98–107)
CO2 SERPL-SCNC: 23 MMOL/L (ref 21–32)
CREAT SERPL-MCNC: 0.97 MG/DL (ref 0.5–1.05)
EGFRCR SERPLBLD CKD-EPI 2021: 57 ML/MIN/1.73M*2
ERYTHROCYTE [DISTWIDTH] IN BLOOD BY AUTOMATED COUNT: 12.9 % (ref 11.5–14.5)
GLUCOSE SERPL-MCNC: 75 MG/DL (ref 74–99)
HCT VFR BLD AUTO: 41.6 % (ref 36–46)
HGB BLD-MCNC: 13.6 G/DL (ref 12–16)
MCH RBC QN AUTO: 33.2 PG (ref 26–34)
MCHC RBC AUTO-ENTMCNC: 32.7 G/DL (ref 32–36)
MCV RBC AUTO: 102 FL (ref 80–100)
NRBC BLD-RTO: 0 /100 WBCS (ref 0–0)
PLATELET # BLD AUTO: 194 X10*3/UL (ref 150–450)
POTASSIUM SERPL-SCNC: 4.4 MMOL/L (ref 3.5–5.3)
RBC # BLD AUTO: 4.1 X10*6/UL (ref 4–5.2)
SODIUM SERPL-SCNC: 137 MMOL/L (ref 136–145)
WBC # BLD AUTO: 8.6 X10*3/UL (ref 4.4–11.3)

## 2025-02-03 PROCEDURE — 85027 COMPLETE CBC AUTOMATED: CPT | Mod: OUT | Performed by: EMERGENCY MEDICINE

## 2025-02-03 PROCEDURE — 36415 COLL VENOUS BLD VENIPUNCTURE: CPT | Mod: OUT | Performed by: EMERGENCY MEDICINE

## 2025-02-03 PROCEDURE — 80048 BASIC METABOLIC PNL TOTAL CA: CPT | Mod: OUT | Performed by: EMERGENCY MEDICINE

## 2025-02-11 LAB
ATRIAL RATE: 82 BPM
P AXIS: 44 DEGREES
P OFFSET: 171 MS
P ONSET: 120 MS
PR INTERVAL: 198 MS
Q ONSET: 219 MS
QRS COUNT: 14 BEATS
QRS DURATION: 132 MS
QT INTERVAL: 440 MS
QTC CALCULATION(BAZETT): 514 MS
QTC FREDERICIA: 488 MS
R AXIS: -59 DEGREES
T AXIS: 118 DEGREES
T OFFSET: 439 MS
VENTRICULAR RATE: 82 BPM

## 2025-04-23 PROCEDURE — 88307 TISSUE EXAM BY PATHOLOGIST: CPT

## 2025-04-23 PROCEDURE — 88307 TISSUE EXAM BY PATHOLOGIST: CPT | Performed by: PATHOLOGY

## 2025-04-24 ENCOUNTER — LAB REQUISITION (OUTPATIENT)
Dept: LAB | Facility: HOSPITAL | Age: 85
End: 2025-04-24
Payer: MEDICARE

## 2025-04-24 DIAGNOSIS — C67.8 MALIGNANT NEOPLASM OF OVERLAPPING SITES OF BLADDER (MULTI): ICD-10-CM

## 2025-05-06 LAB
LABORATORY COMMENT REPORT: NORMAL
PATH REPORT.FINAL DX SPEC: NORMAL
PATH REPORT.GROSS SPEC: NORMAL
PATH REPORT.RELEVANT HX SPEC: NORMAL
PATH REPORT.TOTAL CANCER: NORMAL
RESIDENT REVIEW: NORMAL

## 2025-08-01 ENCOUNTER — NURSING HOME VISIT (OUTPATIENT)
Dept: POST ACUTE CARE | Facility: EXTERNAL LOCATION | Age: 85
End: 2025-08-01
Payer: MEDICARE

## 2025-08-01 VITALS
OXYGEN SATURATION: 96 % | TEMPERATURE: 97.5 F | HEART RATE: 71 BPM | DIASTOLIC BLOOD PRESSURE: 82 MMHG | RESPIRATION RATE: 16 BRPM | SYSTOLIC BLOOD PRESSURE: 152 MMHG

## 2025-08-01 DIAGNOSIS — Z86.73 HISTORY OF STROKE: ICD-10-CM

## 2025-08-01 DIAGNOSIS — E78.5 DYSLIPIDEMIA: ICD-10-CM

## 2025-08-01 DIAGNOSIS — F41.9 ANXIETY: ICD-10-CM

## 2025-08-01 DIAGNOSIS — H90.3 SENSORINEURAL HEARING LOSS, BILATERAL: ICD-10-CM

## 2025-08-01 DIAGNOSIS — R41.89 IMPAIRED COGNITION: ICD-10-CM

## 2025-08-01 DIAGNOSIS — I10 BENIGN HYPERTENSION: Primary | ICD-10-CM

## 2025-08-01 DIAGNOSIS — F01.53 VASCULAR DEMENTIA WITH MOOD DISTURBANCE, UNSPECIFIED DEMENTIA SEVERITY: ICD-10-CM

## 2025-08-01 DIAGNOSIS — I67.9 CEREBROVASCULAR DISEASE: ICD-10-CM

## 2025-08-01 DIAGNOSIS — R41.82 ALTERED MENTAL STATUS, UNSPECIFIED ALTERED MENTAL STATUS TYPE: ICD-10-CM

## 2025-08-01 DIAGNOSIS — G25.0 ESSENTIAL TREMOR: ICD-10-CM

## 2025-08-01 DIAGNOSIS — C67.8 MALIGNANT NEOPLASM OF OVERLAPPING SITES OF BLADDER (MULTI): ICD-10-CM

## 2025-08-01 DIAGNOSIS — R01.1 HEART MURMUR: ICD-10-CM

## 2025-08-01 DIAGNOSIS — F43.21 ADJUSTMENT DISORDER WITH DEPRESSED MOOD: ICD-10-CM

## 2025-08-01 PROBLEM — R73.01 IMPAIRED FASTING BLOOD SUGAR: Status: RESOLVED | Noted: 2023-11-20 | Resolved: 2025-08-01
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
## 2025-08-01 PROBLEM — E87.1 HYPONATREMIA: Status: RESOLVED | Noted: 2023-09-02 | Resolved: 2025-08-01
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
## 2025-08-01 PROCEDURE — 99344 HOME/RES VST NEW MOD MDM 60: CPT

## 2025-08-01 RX ORDER — ESCITALOPRAM OXALATE 5 MG/1
5 TABLET ORAL
COMMUNITY
Start: 2025-06-26

## 2025-08-01 ASSESSMENT — ENCOUNTER SYMPTOMS
FATIGUE: 0
VOMITING: 0
CHILLS: 0
PALPITATIONS: 0
BRUISES/BLEEDS EASILY: 0
TROUBLE SWALLOWING: 0
HEADACHES: 0
BLOOD IN STOOL: 0
FACIAL SWELLING: 0
HEMATURIA: 0
WEAKNESS: 0
JOINT SWELLING: 0
PHOTOPHOBIA: 0
SPEECH DIFFICULTY: 0
NAUSEA: 0
EYE ITCHING: 0
ABDOMINAL PAIN: 0
CHOKING: 0
POLYDIPSIA: 0
EYE PAIN: 0
MYALGIAS: 0
ARTHRALGIAS: 0
RHINORRHEA: 0
VOICE CHANGE: 0
CONSTIPATION: 0
SHORTNESS OF BREATH: 0
DIZZINESS: 1
SEIZURES: 0
WHEEZING: 0
EYE DISCHARGE: 0
FREQUENCY: 0
ADENOPATHY: 0
ABDOMINAL DISTENTION: 0
FLANK PAIN: 0
DYSURIA: 0
DIFFICULTY URINATING: 0
SINUS PAIN: 0
WOUND: 0
SINUS PRESSURE: 0
ANAL BLEEDING: 0
FEVER: 0
BACK PAIN: 0
APPETITE CHANGE: 0
FACIAL ASYMMETRY: 0
LIGHT-HEADEDNESS: 0
DIAPHORESIS: 0
SORE THROAT: 0
TREMORS: 0
EYE REDNESS: 0
POLYPHAGIA: 0
COUGH: 0
DIARRHEA: 0

## 2025-08-10 ENCOUNTER — HOSPITAL ENCOUNTER (EMERGENCY)
Facility: HOSPITAL | Age: 85
Discharge: HOME | End: 2025-08-10
Attending: EMERGENCY MEDICINE
Payer: MEDICARE

## 2025-08-10 VITALS
DIASTOLIC BLOOD PRESSURE: 83 MMHG | WEIGHT: 134.04 LBS | HEART RATE: 63 BPM | HEIGHT: 62 IN | BODY MASS INDEX: 24.67 KG/M2 | TEMPERATURE: 96.8 F | RESPIRATION RATE: 18 BRPM | OXYGEN SATURATION: 97 % | SYSTOLIC BLOOD PRESSURE: 172 MMHG

## 2025-08-10 DIAGNOSIS — R29.6 FREQUENT FALLS: Primary | ICD-10-CM

## 2025-08-10 PROCEDURE — 99283 EMERGENCY DEPT VISIT LOW MDM: CPT | Performed by: EMERGENCY MEDICINE

## 2025-08-10 ASSESSMENT — LIFESTYLE VARIABLES
HAVE PEOPLE ANNOYED YOU BY CRITICIZING YOUR DRINKING: NO
TOTAL SCORE: 0
EVER HAD A DRINK FIRST THING IN THE MORNING TO STEADY YOUR NERVES TO GET RID OF A HANGOVER: NO
HAVE YOU EVER FELT YOU SHOULD CUT DOWN ON YOUR DRINKING: NO
EVER FELT BAD OR GUILTY ABOUT YOUR DRINKING: NO

## 2025-08-19 PROBLEM — R47.89 WORD FINDING DIFFICULTY: Status: RESOLVED | Noted: 2023-09-02 | Resolved: 2025-08-19

## 2025-08-19 PROBLEM — M76.899 ENTHESOPATHY OF HIP REGION: Status: RESOLVED | Noted: 2023-09-02 | Resolved: 2025-08-19

## 2025-08-19 PROBLEM — I61.9 CEREBRAL HEMORRHAGE (MULTI): Status: RESOLVED | Noted: 2023-09-02 | Resolved: 2025-08-19

## 2025-08-19 PROBLEM — R07.89 PAIN OF STERNUM: Status: RESOLVED | Noted: 2023-09-02 | Resolved: 2025-08-19

## 2025-08-19 PROBLEM — R53.1 ASTHENIA: Status: RESOLVED | Noted: 2023-09-02 | Resolved: 2025-08-19

## 2025-08-19 PROBLEM — R47.9 SPEECH PROBLEM: Status: RESOLVED | Noted: 2023-09-02 | Resolved: 2025-08-19

## 2025-08-19 PROBLEM — R40.1 CLOUDED CONSCIOUSNESS: Status: RESOLVED | Noted: 2023-09-02 | Resolved: 2025-08-19

## 2025-08-19 PROBLEM — R07.1 PAINFUL BREATHING: Status: RESOLVED | Noted: 2023-09-02 | Resolved: 2025-08-19

## 2025-08-19 PROBLEM — I63.9 CEREBROVASCULAR ACCIDENT (MULTI): Status: RESOLVED | Noted: 2023-09-02 | Resolved: 2025-08-19

## 2025-08-19 PROBLEM — M17.9 OSTEOARTHROSIS OF KNEE: Status: RESOLVED | Noted: 2023-09-02 | Resolved: 2025-08-19

## 2025-08-19 PROBLEM — R51.9 HEADACHE: Status: RESOLVED | Noted: 2023-09-02 | Resolved: 2025-08-19

## 2025-08-19 PROBLEM — S22.42XA FRACTURE OF MULTIPLE RIBS OF LEFT SIDE: Status: RESOLVED | Noted: 2025-02-01 | Resolved: 2025-08-19
